# Patient Record
Sex: FEMALE | Race: WHITE | NOT HISPANIC OR LATINO | Employment: OTHER | URBAN - METROPOLITAN AREA
[De-identification: names, ages, dates, MRNs, and addresses within clinical notes are randomized per-mention and may not be internally consistent; named-entity substitution may affect disease eponyms.]

---

## 2017-02-27 DIAGNOSIS — I10 ESSENTIAL HYPERTENSION: ICD-10-CM

## 2017-03-02 DIAGNOSIS — I10 ESSENTIAL HYPERTENSION, BENIGN: ICD-10-CM

## 2017-03-02 RX ORDER — IRBESARTAN 150 MG/1
TABLET ORAL
Qty: 30 TAB | Refills: 6 | Status: SHIPPED | OUTPATIENT
Start: 2017-03-02 | End: 2017-03-02 | Stop reason: SDUPTHER

## 2017-03-06 RX ORDER — IRBESARTAN 150 MG/1
TABLET ORAL
Qty: 90 TAB | Refills: 1 | Status: SHIPPED | OUTPATIENT
Start: 2017-03-06 | End: 2017-12-01 | Stop reason: SDUPTHER

## 2017-04-04 ENCOUNTER — HOSPITAL ENCOUNTER (OUTPATIENT)
Dept: LAB | Facility: MEDICAL CENTER | Age: 77
End: 2017-04-04
Attending: INTERNAL MEDICINE
Payer: MEDICARE

## 2017-04-04 DIAGNOSIS — I10 ESSENTIAL HYPERTENSION, BENIGN: ICD-10-CM

## 2017-04-04 DIAGNOSIS — E87.6 HYPOKALEMIA: ICD-10-CM

## 2017-04-04 LAB
ANION GAP SERPL CALC-SCNC: 8 MMOL/L (ref 0–11.9)
BUN SERPL-MCNC: 10 MG/DL (ref 8–22)
CALCIUM SERPL-MCNC: 9.3 MG/DL (ref 8.5–10.5)
CHLORIDE SERPL-SCNC: 97 MMOL/L (ref 96–112)
CO2 SERPL-SCNC: 29 MMOL/L (ref 20–33)
CREAT SERPL-MCNC: 0.6 MG/DL (ref 0.5–1.4)
GFR SERPL CREATININE-BSD FRML MDRD: >60 ML/MIN/1.73 M 2
GLUCOSE SERPL-MCNC: 86 MG/DL (ref 65–99)
POTASSIUM SERPL-SCNC: 3.7 MMOL/L (ref 3.6–5.5)
SODIUM SERPL-SCNC: 134 MMOL/L (ref 135–145)

## 2017-04-04 PROCEDURE — 36415 COLL VENOUS BLD VENIPUNCTURE: CPT

## 2017-04-04 PROCEDURE — 80048 BASIC METABOLIC PNL TOTAL CA: CPT

## 2017-04-06 ENCOUNTER — OFFICE VISIT (OUTPATIENT)
Dept: CARDIOLOGY | Facility: MEDICAL CENTER | Age: 77
End: 2017-04-06
Payer: MEDICARE

## 2017-04-06 VITALS
OXYGEN SATURATION: 98 % | DIASTOLIC BLOOD PRESSURE: 80 MMHG | HEART RATE: 92 BPM | BODY MASS INDEX: 26.5 KG/M2 | HEIGHT: 62 IN | WEIGHT: 144 LBS | SYSTOLIC BLOOD PRESSURE: 120 MMHG

## 2017-04-06 DIAGNOSIS — I10 ESSENTIAL HYPERTENSION, BENIGN: ICD-10-CM

## 2017-04-06 PROCEDURE — 4040F PNEUMOC VAC/ADMIN/RCVD: CPT | Mod: 8P | Performed by: INTERNAL MEDICINE

## 2017-04-06 PROCEDURE — 99213 OFFICE O/P EST LOW 20 MIN: CPT | Performed by: INTERNAL MEDICINE

## 2017-04-06 PROCEDURE — G8432 DEP SCR NOT DOC, RNG: HCPCS | Performed by: INTERNAL MEDICINE

## 2017-04-06 PROCEDURE — 1101F PT FALLS ASSESS-DOCD LE1/YR: CPT | Mod: 8P | Performed by: INTERNAL MEDICINE

## 2017-04-06 PROCEDURE — G8419 CALC BMI OUT NRM PARAM NOF/U: HCPCS | Performed by: INTERNAL MEDICINE

## 2017-04-06 PROCEDURE — 1036F TOBACCO NON-USER: CPT | Performed by: INTERNAL MEDICINE

## 2017-04-06 ASSESSMENT — ENCOUNTER SYMPTOMS
CONSTITUTIONAL NEGATIVE: 1
NAUSEA: 0
PALPITATIONS: 0
SHORTNESS OF BREATH: 0
WEAKNESS: 0
NEUROLOGICAL NEGATIVE: 1
VOMITING: 0
BLURRED VISION: 0

## 2017-04-06 NOTE — PROGRESS NOTES
Subjective:   Danica Rapp is a 76 y.o. female who presents today for follow-up of hypertension and a history of hypokalemia. Patient has had no chest pain or palpitations. She is under severe stress at this time as her  is quite ill in the hospital and on a ventilator. She is having a difficult time doing with his illness and is quite tearful.     Past Medical History   Diagnosis Date   • Nonspecific abnormal electrocardiogram (ECG) (EKG) 6/5/2012   • Other chest pain 6/5/2012   • Essential hypertension, benign 6/5/2012     History reviewed. No pertinent past surgical history.  History reviewed. No pertinent family history.  History   Smoking status   • Never Smoker    Smokeless tobacco   • Never Used     Allergies   Allergen Reactions   • Macrodantin [Nitrofurantoin Macrocrystal] Itching and Swelling   • Augmentin Itching     Headaches   • Z-Pako [Azithromycin] Itching and Nausea     Outpatient Encounter Prescriptions as of 4/6/2017   Medication Sig Dispense Refill   • irbesartan (AVAPRO) 150 MG Tab TAKE 1 TABLET BY MOUTH DAILY 90 Tab 1   • vitamin D (CHOLECALCIFEROL) 1000 UNIT Tab Take 1,000 Units by mouth every day.     • potassium chloride (KLOR-CON) 8 MEQ tablet Take 1 Tab by mouth every day. 90 Tab 3   • amlodipine (NORVASC) 5 MG Tab Take 1 Tab by mouth every day. 90 Tab 3   • Docusate Calcium (STOOL SOFTENER PO) Take 1 Tab by mouth every bedtime.     • Probiotic Product (PROBIOTIC DAILY PO) Take 1 Each by mouth every day.     • estradiol (VIVELLE DOT) 0.1 MG/24HR PTTW Apply 1 Patch to skin as directed every 7 days.     • Calcium Carbonate-Vitamin D (CALCIUM + D PO) Take 500 mg by mouth every day.     • Multiple Vitamin (MULTI-VITAMIN DAILY PO) Take  by mouth every day.     • estradiol (ESTRACE) 1 MG TABS Take 1 mg by mouth. Three times a week (M-W-F)     • omeprazole (PRILOSEC) 20 MG CPDR Take 20 mg by mouth every day.       • aspirin EC (ECOTRIN) 81 MG TBEC Take 81 mg by mouth every 48 hours.  "Indications: NOT TAKING     • NON SPECIFIED Theracran 650mg, SI Twice a day     • sulfamethoxazole-trimethoprim (BACTRIM DS,SEPTRA DS) 800-160 MG TABS Take 1 Tab by mouth every day.         No facility-administered encounter medications on file as of 2017.     Review of Systems   Constitutional: Negative.  Negative for malaise/fatigue.   Eyes: Negative for blurred vision.   Respiratory: Negative for shortness of breath.    Cardiovascular: Negative for chest pain, palpitations and leg swelling.   Gastrointestinal: Negative for nausea and vomiting.   Neurological: Negative.  Negative for weakness.   Psychiatric/Behavioral:        Situational stress with her 's illness        Objective:   /80 mmHg  Pulse 92  Ht 1.575 m (5' 2.01\")  Wt 65.318 kg (144 lb)  BMI 26.33 kg/m2  SpO2 98%    Physical Exam   Constitutional: She is oriented to person, place, and time. She appears well-developed and well-nourished. No distress.   HENT:   Head: Atraumatic.   Eyes: Conjunctivae and EOM are normal. Pupils are equal, round, and reactive to light.   Neck: Neck supple. No JVD present.   Cardiovascular: Normal rate and regular rhythm.    Murmur heard.  Faint systolic ejection murmur   Pulmonary/Chest: Effort normal and breath sounds normal. No respiratory distress. She has no wheezes. She has no rales.   Abdominal: Soft. There is no tenderness.   Musculoskeletal: She exhibits no edema.   Neurological: She is alert and oriented to person, place, and time.   Skin: Skin is warm and dry. She is not diaphoretic.       Assessment:     1. Essential hypertension, benign         Medical Decision Making:  Today's Assessment / Status / Plan:   Her blood pressure is under reasonable control. Potassium is 3.7 by reviewed with her. Return 6 months.  "

## 2017-04-06 NOTE — MR AVS SNAPSHOT
"        Danica Rapp   2017 3:40 PM   Office Visit   MRN: [id number]    Department:  [facility]   Dept Phone:  [phone]    Description:  Female : 1940   Provider:  Christo Garrett M.D.           Reason for Visit     Follow-Up           Allergies as of 2017     Allergen Noted Reactions    Macrodantin [Nitrofurantoin Macrocrystal] 10/31/2012   Itching, Swelling    Augmentin 10/31/2012   Itching    Headaches    Z-Pako [Azithromycin] 10/31/2012   Itching, Nausea      You were diagnosed with     Essential hypertension, benign   [401.1.ICD-9-CM]         Vital Signs     Blood Pressure Pulse Height Weight Body Mass Index Oxygen Saturation    120/80 mmHg 92 1.575 m (5' 2.01\") 65.318 kg (144 lb) 26.33 kg/m2 98%    Smoking Status                   Never Smoker            Basic Information     Date Of Birth Sex Race Ethnicity Preferred Language    1940 Female White Non- English      Your appointments     Oct 16, 2017  1:40 PM   FOLLOW UP with Christo Garrett M.D.   [facility] (--)    [address]  [facility] [id number] [phone]              Problem List              ICD-10-CM Priority Class Noted - Resolved    Nonspecific abnormal electrocardiogram (ECG) (EKG) R94.31   2012 - Present    Other chest pain R07.89   2012 - Present    Essential hypertension, benign I10   2012 - Present    Hypokalemia E87.6   7/15/2014 - Present      Health Maintenance        Date Due Completion Dates    IMM DTaP/Tdap/Td Vaccine (1 - Tdap) 1959 ---    PAP SMEAR 1961 ---    MAMMOGRAM 1980 ---    COLONOSCOPY 1990 ---    IMM ZOSTER VACCINE 2000 ---    IMM PNEUMOCOCCAL 65+ (ADULT) LOW/MEDIUM RISK SERIES (1 of 2 - PCV13) 2005 ---    BONE DENSITY 10/17/2018 10/17/2013, 3/4/2010, 2007            Current Immunizations     No immunizations on file.      Below and/or attached are the medications your provider " expects you to take. Review all of your home medications and newly ordered medications with your provider and/or pharmacist. Follow medication instructions as directed by your provider and/or pharmacist. Please keep your medication list with you and share with your provider. Update the information when medications are discontinued, doses are changed, or new medications (including over-the-counter products) are added; and carry medication information at all times in the event of emergency situations     Allergies:  MACRODANTIN - Itching,Swelling     AUGMENTIN - Itching     Z-VLAD - Itching,Nausea               Medications  Valid as of: 2017 -  4:06 PM    Generic Name Brand Name Tablet Size Instructions for use    AmLODIPine Besylate (Tab) NORVASC 5 MG Take 1 Tab by mouth every day.        Aspirin (Tablet Delayed Response) ECOTRIN 81 MG Take 81 mg by mouth every 48 hours. Indications: NOT TAKING        Calcium Citrate-Vitamin D   Take 500 mg by mouth every day.        Cholecalciferol (Tab) cholecalciferol 1000 UNIT Take 1,000 Units by mouth every day.        Docusate Calcium   Take 1 Tab by mouth every bedtime.        Estradiol (Tab) ESTRACE 1 MG Take 1 mg by mouth. Three times a week (--)        Estradiol (PATCH BIWEEKLY) VIVELLE DOT 0.1 MG/24HR Apply 1 Patch to skin as directed every 7 days.        Irbesartan (Tab) AVAPRO 150 MG TAKE 1 TABLET BY MOUTH DAILY        Multiple Vitamin   Take  by mouth every day.        NON SPECIFIED   Theracran 650mg, SI Twice a day        Omeprazole (CAPSULE DELAYED RELEASE) PRILOSEC 20 MG Take 20 mg by mouth every day.          Potassium Chloride (Tab CR) KLOR-CON 8 MEQ Take 1 Tab by mouth every day.        Probiotic Product   Take 1 Each by mouth every day.        Sulfamethoxazole-Trimethoprim (Tab) BACTRIM DS,SEPTRA -160 MG Take 1 Tab by mouth every day.          .                 Medicines prescribed today were sent to:     [id number] - [place]  [place] - [address]    [address] [place] [address]    Phone: [phone] Fax: [phone]    Open 24 Hours?: No      Medication refill instructions:       If your prescription bottle indicates you have medication refills left, it is not necessary to call your provider’s office. Please contact your pharmacy and they will refill your medication.    If your prescription bottle indicates you do not have any refills left, you may request refills at any time through one of the following ways: The online [employer] system (except Urgent Care), by calling your provider’s office, or by asking your pharmacy to contact your provider’s office with a refill request. Medication refills are processed only during regular business hours and may not be available until the next business day. Your provider may request additional information or to have a follow-up visit with you prior to refilling your medication.   *Please Note: Medication refills are assigned a new Rx number when refilled electronically. Your pharmacy may indicate that no refills were authorized even though a new prescription for the same medication is available at the pharmacy. Please request the medicine by name with the pharmacy before contacting your provider for a refill.           [employer] Access Code: Activation code not generated  Current [employer] Status: Active

## 2017-04-13 RX ORDER — ALPRAZOLAM 0.25 MG/1
0.25 TABLET ORAL 2 TIMES DAILY PRN
COMMUNITY
Start: 2017-04-13 | End: 2018-04-04

## 2017-04-13 RX ORDER — ALPRAZOLAM 0.25 MG/1
TABLET ORAL
Refills: 0 | OUTPATIENT
Start: 2017-04-13

## 2017-04-13 NOTE — TELEPHONE ENCOUNTER
Per Dr. Garrett: okay to refill Xanax 0.25mg #15 + 1 refill.  Called refill to pharmacy. Pt. Advised.

## 2017-04-13 NOTE — TELEPHONE ENCOUNTER
----- Message from Adrienne Cruz L.P.N. sent at 4/13/2017  9:07 AM PDT -----  Regarding: RE: patient calling to check in with you  Spoke with pt. Dr. Garrett gave her a handwritten RX for Xanax 0.25mg 1 tab BID prn for anxiety #15. She is requesting a refill.  ----- Message -----     From: Anjana Marcos     Sent: 4/13/2017   8:18 AM       To: Adrienne Cruz L.P.N.  Subject: patient calling to check in with you             JESSICA/Adrienne      Patient was told by Dr. Garrett last week to call to let you know how she is doing. She will call back before she leaves to go see her  who is in ICU. [phone]

## 2017-06-03 ENCOUNTER — HOSPITAL ENCOUNTER (OUTPATIENT)
Dept: LAB | Facility: MEDICAL CENTER | Age: 77
End: 2017-06-03
Attending: INTERNAL MEDICINE
Payer: MEDICARE

## 2017-06-03 LAB
ALBUMIN SERPL BCP-MCNC: 3.5 G/DL (ref 3.2–4.9)
ALBUMIN/GLOB SERPL: 1.1 G/DL
ALP SERPL-CCNC: 68 U/L (ref 30–99)
ALT SERPL-CCNC: 20 U/L (ref 2–50)
ANION GAP SERPL CALC-SCNC: 9 MMOL/L (ref 0–11.9)
AST SERPL-CCNC: 21 U/L (ref 12–45)
BASOPHILS # BLD AUTO: 0.9 % (ref 0–1.8)
BASOPHILS # BLD: 0.03 K/UL (ref 0–0.12)
BILIRUB SERPL-MCNC: 0.5 MG/DL (ref 0.1–1.5)
BUN SERPL-MCNC: 12 MG/DL (ref 8–22)
CALCIUM SERPL-MCNC: 8.9 MG/DL (ref 8.5–10.5)
CHLORIDE SERPL-SCNC: 97 MMOL/L (ref 96–112)
CHOLEST SERPL-MCNC: 166 MG/DL (ref 100–199)
CO2 SERPL-SCNC: 25 MMOL/L (ref 20–33)
CREAT SERPL-MCNC: 0.69 MG/DL (ref 0.5–1.4)
EOSINOPHIL # BLD AUTO: 0.1 K/UL (ref 0–0.51)
EOSINOPHIL NFR BLD: 2.9 % (ref 0–6.9)
ERYTHROCYTE [DISTWIDTH] IN BLOOD BY AUTOMATED COUNT: 46.7 FL (ref 35.9–50)
GFR SERPL CREATININE-BSD FRML MDRD: >60 ML/MIN/1.73 M 2
GLOBULIN SER CALC-MCNC: 3.3 G/DL (ref 1.9–3.5)
GLUCOSE SERPL-MCNC: 87 MG/DL (ref 65–99)
HCT VFR BLD AUTO: 36.2 % (ref 37–47)
HDLC SERPL-MCNC: 57 MG/DL
HGB BLD-MCNC: 11.9 G/DL (ref 12–16)
IMM GRANULOCYTES # BLD AUTO: 0.01 K/UL (ref 0–0.11)
IMM GRANULOCYTES NFR BLD AUTO: 0.3 % (ref 0–0.9)
LDLC SERPL CALC-MCNC: 95 MG/DL
LYMPHOCYTES # BLD AUTO: 1.15 K/UL (ref 1–4.8)
LYMPHOCYTES NFR BLD: 33 % (ref 22–41)
MCH RBC QN AUTO: 29 PG (ref 27–33)
MCHC RBC AUTO-ENTMCNC: 32.9 G/DL (ref 33.6–35)
MCV RBC AUTO: 88.3 FL (ref 81.4–97.8)
MONOCYTES # BLD AUTO: 0.39 K/UL (ref 0–0.85)
MONOCYTES NFR BLD AUTO: 11.2 % (ref 0–13.4)
NEUTROPHILS # BLD AUTO: 1.81 K/UL (ref 2–7.15)
NEUTROPHILS NFR BLD: 51.7 % (ref 44–72)
NRBC # BLD AUTO: 0 K/UL
NRBC BLD AUTO-RTO: 0 /100 WBC
PLATELET # BLD AUTO: 391 K/UL (ref 164–446)
PMV BLD AUTO: 10.4 FL (ref 9–12.9)
POTASSIUM SERPL-SCNC: 4.1 MMOL/L (ref 3.6–5.5)
PROT SERPL-MCNC: 6.8 G/DL (ref 6–8.2)
RBC # BLD AUTO: 4.1 M/UL (ref 4.2–5.4)
SODIUM SERPL-SCNC: 131 MMOL/L (ref 135–145)
TRIGL SERPL-MCNC: 68 MG/DL (ref 0–149)
WBC # BLD AUTO: 3.5 K/UL (ref 4.8–10.8)

## 2017-06-03 PROCEDURE — 36415 COLL VENOUS BLD VENIPUNCTURE: CPT

## 2017-06-03 PROCEDURE — 85025 COMPLETE CBC W/AUTO DIFF WBC: CPT

## 2017-06-03 PROCEDURE — 80053 COMPREHEN METABOLIC PANEL: CPT

## 2017-06-03 PROCEDURE — 80061 LIPID PANEL: CPT

## 2017-08-18 ENCOUNTER — OFFICE VISIT (OUTPATIENT)
Dept: CARDIOLOGY | Facility: MEDICAL CENTER | Age: 77
End: 2017-08-18
Payer: MEDICARE

## 2017-08-18 VITALS
DIASTOLIC BLOOD PRESSURE: 70 MMHG | HEART RATE: 88 BPM | WEIGHT: 139 LBS | HEIGHT: 62 IN | SYSTOLIC BLOOD PRESSURE: 150 MMHG | OXYGEN SATURATION: 95 % | BODY MASS INDEX: 25.58 KG/M2

## 2017-08-18 DIAGNOSIS — I10 ESSENTIAL HYPERTENSION, BENIGN: ICD-10-CM

## 2017-08-18 DIAGNOSIS — R94.31 NONSPECIFIC ABNORMAL ELECTROCARDIOGRAM (ECG) (EKG): ICD-10-CM

## 2017-08-18 PROCEDURE — 99213 OFFICE O/P EST LOW 20 MIN: CPT | Performed by: INTERNAL MEDICINE

## 2017-08-18 RX ORDER — CARVEDILOL 6.25 MG/1
6.25 TABLET ORAL 2 TIMES DAILY WITH MEALS
Qty: 60 TAB | Refills: 3 | Status: SHIPPED | OUTPATIENT
Start: 2017-08-18 | End: 2017-08-18 | Stop reason: SDUPTHER

## 2017-08-18 ASSESSMENT — ENCOUNTER SYMPTOMS
NEUROLOGICAL NEGATIVE: 1
CONSTITUTIONAL NEGATIVE: 1
PALPITATIONS: 0
NAUSEA: 0
VOMITING: 0
BLURRED VISION: 0
WEAKNESS: 0
SHORTNESS OF BREATH: 0

## 2017-08-18 NOTE — Clinical Note
[facility]   [address]  [place] [address]  Phone: [phone]  Fax: [phone]              Danica Rapp  1940    Encounter Date: 2017    Christo Garrett M.D.          PROGRESS NOTE:  Subjective:   Danica Rapp is a 76 y.o. female who presents today for follow-up of hypertension. Over the last month, she's had problems with afternoon edema. No chest pain or palpitations.  Her   at [facility] in May and she is having a tough time doing with her loss. She has seen a psychologist. The patient was given a few Xanax prior to her 's death because of anxiety. Her psychologist with prefer to not use any of this medication at this time. No other interval problems.    Past Medical History   Diagnosis Date   • Nonspecific abnormal electrocardiogram (ECG) (EKG) 2012   • Other chest pain 2012   • Essential hypertension, benign 2012     No past surgical history on file.  No family history on file.  History   Smoking status   • Never Smoker    Smokeless tobacco   • Never Used     Allergies   Allergen Reactions   • Macrodantin [Nitrofurantoin Macrocrystal] Itching and Swelling   • Augmentin Itching     Headaches   • Z-Pako [Azithromycin] Itching and Nausea     Outpatient Encounter Prescriptions as of 2017   Medication Sig Dispense Refill   • carvedilol (COREG) 6.25 MG Tab Take 1 Tab by mouth 2 times a day, with meals. 60 Tab 3   • alprazolam (XANAX) 0.25 MG Tab Take 1 Tab by mouth 2 times a day as needed. For anxiety.     • irbesartan (AVAPRO) 150 MG Tab TAKE 1 TABLET BY MOUTH DAILY 90 Tab 1   • vitamin D (CHOLECALCIFEROL) 1000 UNIT Tab Take 1,000 Units by mouth every day.     • potassium chloride (KLOR-CON) 8 MEQ tablet Take 1 Tab by mouth every day. 90 Tab 3   • Docusate Calcium (STOOL SOFTENER PO) Take 1 Tab by mouth every bedtime.     • NON SPECIFIED Theracran 650mg, SI Twice a day     • Probiotic  "Product (PROBIOTIC DAILY PO) Take 1 Each by mouth every day.     • estradiol (VIVELLE DOT) 0.1 MG/24HR PTTW Apply 1 Patch to skin as directed every 7 days.     • Calcium Carbonate-Vitamin D (CALCIUM + D PO) Take 500 mg by mouth every day.     • Multiple Vitamin (MULTI-VITAMIN DAILY PO) Take  by mouth every day.     • estradiol (ESTRACE) 1 MG TABS Take 1 mg by mouth. Three times a week (M-W-F)     • omeprazole (PRILOSEC) 20 MG CPDR Take 20 mg by mouth every day.       • [DISCONTINUED] amlodipine (NORVASC) 5 MG Tab Take 1 Tab by mouth every day. 90 Tab 3   • sulfamethoxazole-trimethoprim (BACTRIM DS,SEPTRA DS) 800-160 MG TABS Take 1 Tab by mouth every day.       • aspirin EC (ECOTRIN) 81 MG TBEC Take 81 mg by mouth every 48 hours. Indications: NOT TAKING       No facility-administered encounter medications on file as of 8/18/2017.     Review of Systems   Constitutional: Negative.  Negative for malaise/fatigue.   Eyes: Negative for blurred vision.   Respiratory: Negative for shortness of breath.    Cardiovascular: Negative for chest pain, palpitations and leg swelling.   Gastrointestinal: Negative for nausea and vomiting.   Neurological: Negative.  Negative for weakness.   Psychiatric/Behavioral:        Situational stress with her 's illness        Objective:   /70 mmHg  Pulse 88  Ht 1.575 m (5' 2.01\")  Wt 63.05 kg (139 lb)  BMI 25.42 kg/m2  SpO2 95%    Physical Exam   Constitutional: She is oriented to person, place, and time. She appears well-developed and well-nourished. No distress.   HENT:   Head: Atraumatic.   Eyes: Conjunctivae and EOM are normal. Pupils are equal, round, and reactive to light.   Neck: Neck supple. No JVD present.   Cardiovascular: Normal rate and regular rhythm.    Murmur heard.  Faint systolic ejection murmur   Pulmonary/Chest: Effort normal and breath sounds normal. No respiratory distress. She has no wheezes. She has no rales.   Abdominal: Soft. There is no tenderness.   "   Musculoskeletal: She exhibits no edema.   Neurological: She is alert and oriented to person, place, and time.   Skin: Skin is warm and dry. She is not diaphoretic.       Assessment:     1. Essential hypertension, benign  carvedilol (COREG) 6.25 MG Tab   2. Nonspecific abnormal electrocardiogram (ECG) (EKG)  carvedilol (COREG) 6.25 MG Tab       Medical Decision Making:  Today's Assessment / Status / Plan:   The patient's blood pressure is under good control, but she is having an afternoon edema. Parenthetically, she has no significant edema now. The patient has a history of hypokalemia when she was on diuretics, will therefore stop amlodipine and put her on carvedilol 6.25 mg twice daily. To return for blood pressure check and she already has a previously scheduled appointment in October.        Jill Adler M.D.  [address] [place] [address] [place] [address]  VIA Facsimile: [phone]

## 2017-08-18 NOTE — PROGRESS NOTES
Subjective:   Danica Rapp is a 76 y.o. female who presents today for follow-up of hypertension. Over the last month, she's had problems with afternoon edema. No chest pain or palpitations.  Her   at [facility] in May and she is having a tough time doing with her loss. She has seen a psychologist. The patient was given a few Xanax prior to her 's death because of anxiety. Her psychologist with prefer to not use any of this medication at this time. No other interval problems.    Past Medical History   Diagnosis Date   • Nonspecific abnormal electrocardiogram (ECG) (EKG) 2012   • Other chest pain 2012   • Essential hypertension, benign 2012     No past surgical history on file.  No family history on file.  History   Smoking status   • Never Smoker    Smokeless tobacco   • Never Used     Allergies   Allergen Reactions   • Macrodantin [Nitrofurantoin Macrocrystal] Itching and Swelling   • Augmentin Itching     Headaches   • Z-Pako [Azithromycin] Itching and Nausea     Outpatient Encounter Prescriptions as of 2017   Medication Sig Dispense Refill   • carvedilol (COREG) 6.25 MG Tab Take 1 Tab by mouth 2 times a day, with meals. 60 Tab 3   • alprazolam (XANAX) 0.25 MG Tab Take 1 Tab by mouth 2 times a day as needed. For anxiety.     • irbesartan (AVAPRO) 150 MG Tab TAKE 1 TABLET BY MOUTH DAILY 90 Tab 1   • vitamin D (CHOLECALCIFEROL) 1000 UNIT Tab Take 1,000 Units by mouth every day.     • potassium chloride (KLOR-CON) 8 MEQ tablet Take 1 Tab by mouth every day. 90 Tab 3   • Docusate Calcium (STOOL SOFTENER PO) Take 1 Tab by mouth every bedtime.     • NON SPECIFIED Theracran 650mg, SI Twice a day     • Probiotic Product (PROBIOTIC DAILY PO) Take 1 Each by mouth every day.     • estradiol (VIVELLE DOT) 0.1 MG/24HR PTTW Apply 1 Patch to skin as directed every 7 days.     • Calcium Carbonate-Vitamin D (CALCIUM + D PO) Take 500 mg by mouth every day.     • Multiple Vitamin  "(MULTI-VITAMIN DAILY PO) Take  by mouth every day.     • estradiol (ESTRACE) 1 MG TABS Take 1 mg by mouth. Three times a week (M-W-F)     • omeprazole (PRILOSEC) 20 MG CPDR Take 20 mg by mouth every day.       • [DISCONTINUED] amlodipine (NORVASC) 5 MG Tab Take 1 Tab by mouth every day. 90 Tab 3   • sulfamethoxazole-trimethoprim (BACTRIM DS,SEPTRA DS) 800-160 MG TABS Take 1 Tab by mouth every day.       • aspirin EC (ECOTRIN) 81 MG TBEC Take 81 mg by mouth every 48 hours. Indications: NOT TAKING       No facility-administered encounter medications on file as of 8/18/2017.     Review of Systems   Constitutional: Negative.  Negative for malaise/fatigue.   Eyes: Negative for blurred vision.   Respiratory: Negative for shortness of breath.    Cardiovascular: Negative for chest pain, palpitations and leg swelling.   Gastrointestinal: Negative for nausea and vomiting.   Neurological: Negative.  Negative for weakness.   Psychiatric/Behavioral:        Situational stress with her 's illness        Objective:   /70 mmHg  Pulse 88  Ht 1.575 m (5' 2.01\")  Wt 63.05 kg (139 lb)  BMI 25.42 kg/m2  SpO2 95%    Physical Exam   Constitutional: She is oriented to person, place, and time. She appears well-developed and well-nourished. No distress.   HENT:   Head: Atraumatic.   Eyes: Conjunctivae and EOM are normal. Pupils are equal, round, and reactive to light.   Neck: Neck supple. No JVD present.   Cardiovascular: Normal rate and regular rhythm.    Murmur heard.  Faint systolic ejection murmur   Pulmonary/Chest: Effort normal and breath sounds normal. No respiratory distress. She has no wheezes. She has no rales.   Abdominal: Soft. There is no tenderness.   Musculoskeletal: She exhibits no edema.   Neurological: She is alert and oriented to person, place, and time.   Skin: Skin is warm and dry. She is not diaphoretic.       Assessment:     1. Essential hypertension, benign  carvedilol (COREG) 6.25 MG Tab   2. " Nonspecific abnormal electrocardiogram (ECG) (EKG)  carvedilol (COREG) 6.25 MG Tab       Medical Decision Making:  Today's Assessment / Status / Plan:   The patient's blood pressure is under good control, but she is having an afternoon edema. Parenthetically, she has no significant edema now. The patient has a history of hypokalemia when she was on diuretics, will therefore stop amlodipine and put her on carvedilol 6.25 mg twice daily. To return for blood pressure check and she already has a previously scheduled appointment in October.

## 2017-08-18 NOTE — MR AVS SNAPSHOT
"        Danica Rapp   2017 12:40 PM   Office Visit   MRN: [id number]    Department:  [facility]   Dept Phone:  [phone]    Description:  Female : 1940   Provider:  Christo Garrett M.D.           Reason for Visit     Follow-Up           Allergies as of 2017     Allergen Noted Reactions    Macrodantin [Nitrofurantoin Macrocrystal] 10/31/2012   Itching, Swelling    Augmentin 10/31/2012   Itching    Headaches    Z-Pako [Azithromycin] 10/31/2012   Itching, Nausea      You were diagnosed with     Essential hypertension, benign   [401.1.ICD-9-CM]       Nonspecific abnormal electrocardiogram (ECG) (EKG)   [794.31.ICD-9-CM]         Vital Signs     Blood Pressure Pulse Height Weight Body Mass Index Oxygen Saturation    150/70 mmHg 88 1.575 m (5' 2.01\") 63.05 kg (139 lb) 25.42 kg/m2 95%    Smoking Status                   Never Smoker            Basic Information     Date Of Birth Sex Race Ethnicity Preferred Language    1940 Female White Non- English      Your appointments     Oct 16, 2017  1:40 PM   FOLLOW UP with Christo Garrett M.D.   [facility] (--)    [address] , [facility] [address]  [facility] [id number] [phone]              Problem List              ICD-10-CM Priority Class Noted - Resolved    Nonspecific abnormal electrocardiogram (ECG) (EKG) R94.31   2012 - Present    Other chest pain R07.89   2012 - Present    Essential hypertension, benign I10   2012 - Present    Hypokalemia E87.6   7/15/2014 - Present      Health Maintenance        Date Due Completion Dates    IMM DTaP/Tdap/Td Vaccine (1 - Tdap) 1959 ---    PAP SMEAR 1961 ---    MAMMOGRAM 1980 ---    COLONOSCOPY 1990 ---    IMM ZOSTER VACCINE 2000 ---    IMM PNEUMOCOCCAL 65+ (ADULT) LOW/MEDIUM RISK SERIES (1 of 2 - PCV13) 2005 ---    IMM INFLUENZA (1) 2017 ---    BONE DENSITY 10/17/2018 10/17/2013, 3/4/2010, 2007            "   Current Immunizations     No immunizations on file.      Below and/or attached are the medications your provider expects you to take. Review all of your home medications and newly ordered medications with your provider and/or pharmacist. Follow medication instructions as directed by your provider and/or pharmacist. Please keep your medication list with you and share with your provider. Update the information when medications are discontinued, doses are changed, or new medications (including over-the-counter products) are added; and carry medication information at all times in the event of emergency situations     Allergies:  MACRODANTIN - Itching,Swelling     AUGMENTIN - Itching     Z-VLAD - Itching,Nausea               Medications  Valid as of: 2017 -  1:06 PM    Generic Name Brand Name Tablet Size Instructions for use    ALPRAZolam (Tab) XANAX 0.25 MG Take 1 Tab by mouth 2 times a day as needed. For anxiety.        Aspirin (Tablet Delayed Response) ECOTRIN 81 MG Take 81 mg by mouth every 48 hours. Indications: NOT TAKING        Calcium Citrate-Vitamin D   Take 500 mg by mouth every day.        Carvedilol (Tab) COREG 6.25 MG Take 1 Tab by mouth 2 times a day, with meals.        Cholecalciferol (Tab) cholecalciferol 1000 UNIT Take 1,000 Units by mouth every day.        Docusate Calcium   Take 1 Tab by mouth every bedtime.        Estradiol (Tab) ESTRACE 1 MG Take 1 mg by mouth. Three times a week (--)        Estradiol (PATCH BIWEEKLY) VIVELLE DOT 0.1 MG/24HR Apply 1 Patch to skin as directed every 7 days.        Irbesartan (Tab) AVAPRO 150 MG TAKE 1 TABLET BY MOUTH DAILY        Multiple Vitamin   Take  by mouth every day.        NON SPECIFIED   Theracran 650mg, SI Twice a day        Omeprazole (CAPSULE DELAYED RELEASE) PRILOSEC 20 MG Take 20 mg by mouth every day.          Potassium Chloride (Tab CR) KLOR-CON 8 MEQ Take 1 Tab by mouth every day.        Probiotic Product   Take 1 Each by mouth every  day.        Sulfamethoxazole-Trimethoprim (Tab) BACTRIM DS,SEPTRA -160 MG Take 1 Tab by mouth every day.          .                 Medicines prescribed today were sent to:     [facility], [place] - [address] [facility] [address]    [address] [facility] [place] [address]    Phone: [phone] Fax: [phone]    Open 24 Hours?: No      Medication refill instructions:       If your prescription bottle indicates you have medication refills left, it is not necessary to call your provider’s office. Please contact your pharmacy and they will refill your medication.    If your prescription bottle indicates you do not have any refills left, you may request refills at any time through one of the following ways: The online [employer] system (except Urgent Care), by calling your provider’s office, or by asking your pharmacy to contact your provider’s office with a refill request. Medication refills are processed only during regular business hours and may not be available until the next business day. Your provider may request additional information or to have a follow-up visit with you prior to refilling your medication.   *Please Note: Medication refills are assigned a new Rx number when refilled electronically. Your pharmacy may indicate that no refills were authorized even though a new prescription for the same medication is available at the pharmacy. Please request the medicine by name with the pharmacy before contacting your provider for a refill.           Gydget Access Code: Activation code not generated  Current [employer]get Status: Active

## 2017-10-23 ENCOUNTER — APPOINTMENT (RX ONLY)
Dept: URBAN - METROPOLITAN AREA CLINIC 4 | Facility: CLINIC | Age: 77
Setting detail: DERMATOLOGY
End: 2017-10-23

## 2017-10-23 DIAGNOSIS — L72.0 EPIDERMAL CYST: ICD-10-CM

## 2017-10-23 DIAGNOSIS — L82.0 INFLAMED SEBORRHEIC KERATOSIS: ICD-10-CM

## 2017-10-23 DIAGNOSIS — D22 MELANOCYTIC NEVI: ICD-10-CM

## 2017-10-23 DIAGNOSIS — L82.1 OTHER SEBORRHEIC KERATOSIS: ICD-10-CM

## 2017-10-23 DIAGNOSIS — L81.4 OTHER MELANIN HYPERPIGMENTATION: ICD-10-CM | Status: STABLE

## 2017-10-23 DIAGNOSIS — L57.0 ACTINIC KERATOSIS: ICD-10-CM

## 2017-10-23 PROBLEM — D22.9 MELANOCYTIC NEVI, UNSPECIFIED: Status: ACTIVE | Noted: 2017-10-23

## 2017-10-23 PROBLEM — D48.5 NEOPLASM OF UNCERTAIN BEHAVIOR OF SKIN: Status: ACTIVE | Noted: 2017-10-23

## 2017-10-23 PROCEDURE — 11100: CPT | Mod: 59

## 2017-10-23 PROCEDURE — 17110 DESTRUCTION B9 LES UP TO 14: CPT

## 2017-10-23 PROCEDURE — ? BIOPSY BY SHAVE METHOD

## 2017-10-23 PROCEDURE — 17000 DESTRUCT PREMALG LESION: CPT | Mod: 59

## 2017-10-23 PROCEDURE — ? LIQUID NITROGEN

## 2017-10-23 PROCEDURE — ? COUNSELING

## 2017-10-23 PROCEDURE — 17003 DESTRUCT PREMALG LES 2-14: CPT

## 2017-10-23 PROCEDURE — 99213 OFFICE O/P EST LOW 20 MIN: CPT | Mod: 25

## 2017-10-23 ASSESSMENT — LOCATION DETAILED DESCRIPTION DERM
LOCATION DETAILED: LEFT SUPERIOR NASAL CHEEK
LOCATION DETAILED: RIGHT PROXIMAL PRETIBIAL REGION
LOCATION DETAILED: NASAL DORSUM
LOCATION DETAILED: RIGHT LATERAL BREAST 11-12:00 REGION
LOCATION DETAILED: LEFT UPPER CUTANEOUS LIP
LOCATION DETAILED: RIGHT MEDIAL BREAST 2-3:00 REGION
LOCATION DETAILED: RIGHT LATERAL SUPERIOR CHEST
LOCATION DETAILED: LEFT INFERIOR LATERAL NECK
LOCATION DETAILED: RIGHT DISTAL CALF

## 2017-10-23 ASSESSMENT — LOCATION ZONE DERM
LOCATION ZONE: LEG
LOCATION ZONE: FACE
LOCATION ZONE: NOSE
LOCATION ZONE: LIP
LOCATION ZONE: NECK
LOCATION ZONE: TRUNK

## 2017-10-23 ASSESSMENT — LOCATION SIMPLE DESCRIPTION DERM
LOCATION SIMPLE: RIGHT CALF
LOCATION SIMPLE: CHEST
LOCATION SIMPLE: LEFT LIP
LOCATION SIMPLE: RIGHT BREAST
LOCATION SIMPLE: LEFT CHEEK
LOCATION SIMPLE: NOSE
LOCATION SIMPLE: RIGHT PRETIBIAL REGION
LOCATION SIMPLE: LEFT ANTERIOR NECK

## 2017-10-23 NOTE — PROCEDURE: BIOPSY BY SHAVE METHOD
Consent: Written consent was obtained and risks were reviewed including but not limited to scarring, infection, bleeding, scabbing, incomplete removal, nerve damage and allergy to anesthesia.
Wound Care: Petrolatum
Hemostasis: Aluminum Chloride
Silver Nitrate Text: The wound bed was treated with silver nitrate after the biopsy was performed.
Additional Anesthesia Volume In Cc (Will Not Render If 0): 0
Dressing: bandage
Destruction After The Procedure: Yes
Biopsy Type: H and E
Notification Instructions: Patient will be notified of biopsy results. However, patient instructed to call the office if not contacted within 2 weeks.
Render Post-Care Instructions In Note?: no
Cryotherapy Text: The wound bed was treated with cryotherapy after the biopsy was performed.
Biopsy Method: Personna blade
Size Of Lesion In Cm: 0.3
Billing Type: Third-Party Bill
Lab: [id number]
Electrodesiccation And Curettage Text: The wound bed was treated with electrodesiccation and curettage after the biopsy was performed.
Detail Level: Detailed
Anesthesia Type: 1% lidocaine with 1:100,000 epinephrine and 408mcg clindamycin/ml and a 1:10 solution of 8.4% sodium bicarbonate
Lab Facility: 
Post-Care Instructions: I reviewed with the patient in detail post-care instructions. Patient is to keep the biopsy site dry overnight, and then apply bacitracin twice daily until healed. Patient may apply hydrogen peroxide soaks to remove any crusting.
Type Of Destruction Used: Cryotherapy

## 2017-10-23 NOTE — PROCEDURE: LIQUID NITROGEN
Add 52 Modifier (Optional): no
Post-Care Instructions: I reviewed with the patient in detail post-care instructions. Patient is to wear sunprotection, and avoid picking at any of the treated lesions. Pt may apply Vaseline to crusted or scabbing areas.
Detail Level: Detailed
Medical Necessity Information: It is in your best interest to select a reason for this procedure from the list below. All of these items fulfill various CMS LCD requirements except the new and changing color options.
Medical Necessity Clause: This procedure was medically necessary because the lesions that were treated were:
Include Z78.9 (Other Specified Conditions Influencing Health Status) As An Associated Diagnosis?: Yes
Consent: The patient's consent was obtained including but not limited to risks of crusting, scabbing, blistering, scarring, darker or lighter pigmentary change, recurrence, incomplete removal and infection.
Duration Of Freeze Thaw-Cycle (Seconds): 0

## 2017-10-23 NOTE — PROCEDURE: COUNSELING
Detail Level: Zone
Detail Level: Generalized
Detail Level: Detailed
Patient Specific Counseling (Will Not Stick From Patient To Patient): Trial of ln2 today on 2 lesions, left cheek, per pt request.  Advised charge of $150 if desires additional treatment.

## 2017-10-24 ENCOUNTER — OFFICE VISIT (OUTPATIENT)
Dept: CARDIOLOGY | Facility: MEDICAL CENTER | Age: 77
End: 2017-10-24
Payer: MEDICARE

## 2017-10-24 VITALS
WEIGHT: 145 LBS | DIASTOLIC BLOOD PRESSURE: 80 MMHG | BODY MASS INDEX: 26.68 KG/M2 | HEART RATE: 62 BPM | OXYGEN SATURATION: 96 % | SYSTOLIC BLOOD PRESSURE: 122 MMHG | HEIGHT: 62 IN

## 2017-10-24 DIAGNOSIS — I10 ESSENTIAL HYPERTENSION, BENIGN: ICD-10-CM

## 2017-10-24 DIAGNOSIS — F43.29 GRIEF REACTION WITH PROLONGED BEREAVEMENT: ICD-10-CM

## 2017-10-24 PROCEDURE — 99213 OFFICE O/P EST LOW 20 MIN: CPT | Performed by: INTERNAL MEDICINE

## 2017-10-24 ASSESSMENT — ENCOUNTER SYMPTOMS
WEAKNESS: 0
NAUSEA: 0
NEUROLOGICAL NEGATIVE: 1
BLURRED VISION: 0
VOMITING: 0
PALPITATIONS: 0
CONSTITUTIONAL NEGATIVE: 1
SHORTNESS OF BREATH: 0

## 2017-10-24 NOTE — PROGRESS NOTES
Subjective:   Danica Rapp is a 76 y.o. female who presents today for follow-up of hypertension. Last visit, she was started on carvedilol but was only taking this once a day.  The patient's   in May she still having a very difficult time with her grief.  Initially I gave her 15 tablets of Xanax to get her through the crisis. When her  passed away.. Her primary care physician is recommended antidepressant but she does not want to do this. Counseling was discussed and the patient is active done this but did not think it helped much.  Past Medical History:   Diagnosis Date   • Essential hypertension, benign 2012   • Nonspecific abnormal electrocardiogram (ECG) (EKG) 2012   • Other chest pain 2012     History reviewed. No pertinent surgical history.  History reviewed. No pertinent family history.  History   Smoking Status   • Never Smoker   Smokeless Tobacco   • Never Used     Allergies   Allergen Reactions   • Macrodantin [Nitrofurantoin Macrocrystal] Itching and Swelling   • Augmentin Itching     Headaches   • Z-Pako [Azithromycin] Itching and Nausea     Outpatient Encounter Prescriptions as of 10/24/2017   Medication Sig Dispense Refill   • Cranberry (THERACRAN PO) Take  by mouth.     • Polyethylene Glycol 3350 (MIRALAX PO) Take  by mouth.     • carvedilol (COREG) 6.25 MG Tab TAKE 1 TABLET BY MOUTH TWICE DAILY WITH MEALS 180 Tab 0   • alprazolam (XANAX) 0.25 MG Tab Take 1 Tab by mouth 2 times a day as needed. For anxiety.     • vitamin D (CHOLECALCIFEROL) 1000 UNIT Tab Take 1,000 Units by mouth every day.     • potassium chloride (KLOR-CON) 8 MEQ tablet Take 1 Tab by mouth every day. 90 Tab 3   • Docusate Calcium (STOOL SOFTENER PO) Take 1 Tab by mouth every bedtime.     • NON SPECIFIED Theracran 650mg, SI Twice a day     • Probiotic Product (PROBIOTIC DAILY PO) Take 1 Each by mouth every day.     • estradiol (VIVELLE DOT) 0.1 MG/24HR PTTW Apply 1 Patch to skin as directed every 7  "days.     • Calcium Carbonate-Vitamin D (CALCIUM + D PO) Take 500 mg by mouth every day.     • Multiple Vitamin (MULTI-VITAMIN DAILY PO) Take  by mouth every day.     • estradiol (ESTRACE) 1 MG TABS Take 1 mg by mouth. Three times a week (M-W-F)     • omeprazole (PRILOSEC) 20 MG CPDR Take 20 mg by mouth every day.       • irbesartan (AVAPRO) 150 MG Tab TAKE 1 TABLET BY MOUTH DAILY 90 Tab 1   • sulfamethoxazole-trimethoprim (BACTRIM DS,SEPTRA DS) 800-160 MG TABS Take 1 Tab by mouth every day.       • aspirin EC (ECOTRIN) 81 MG TBEC Take 81 mg by mouth every 48 hours. Indications: NOT TAKING       No facility-administered encounter medications on file as of 10/24/2017.      Review of Systems   Constitutional: Negative.  Negative for malaise/fatigue.   Eyes: Negative for blurred vision.   Respiratory: Negative for shortness of breath.    Cardiovascular: Negative for chest pain, palpitations and leg swelling.   Gastrointestinal: Negative for nausea and vomiting.   Neurological: Negative.  Negative for weakness.   Psychiatric/Behavioral:        Situational stress with her 's illness        Objective:   /80   Pulse 62   Ht 1.575 m (5' 2.01\")   Wt 65.8 kg (145 lb)   SpO2 96%   BMI 26.51 kg/m²     Physical Exam   Constitutional: She is oriented to person, place, and time. She appears well-developed and well-nourished. No distress.   HENT:   Head: Atraumatic.   Eyes: Conjunctivae and EOM are normal. Pupils are equal, round, and reactive to light.   Neck: Neck supple. No JVD present.   Cardiovascular: Normal rate and regular rhythm.    Murmur heard.  Faint systolic ejection murmur   Pulmonary/Chest: Effort normal and breath sounds normal. No respiratory distress. She has no wheezes. She has no rales.   Abdominal: Soft. There is no tenderness.   Musculoskeletal: She exhibits no edema.   Neurological: She is alert and oriented to person, place, and time.   Skin: Skin is warm and dry. She is not diaphoretic. "       Assessment:     1. Essential hypertension, benign     2. Grief reaction with prolonged bereavement         Medical Decision Making:  Today's Assessment / Status / Plan:   Her blood pressure remains mildly high and is 145 systolic by my reading. She will continue her irbesartan and increase her carvedilol to twice daily. The patient will check her pressures at home.  Most of the visit was spent discussing her grief reaction. Increased physical activity was discussed. She wishes more Xanax, but was informed that she should go through her primary care physician to discuss pharmacologic therapy for her grief reaction. The patient does not want to take any antidepressants. She also does not want to reconsider counseling.

## 2017-10-24 NOTE — LETTER
[facility]   [address] [facility] [address]  [place] [address]  Phone: [phone]  Fax: [phone]              Danica Rapp  1940    Encounter Date: 10/24/2017    Christo Garrett M.D.          PROGRESS NOTE:  Subjective:   Danica Rapp is a 76 y.o. female who presents today for follow-up of hypertension. Last visit, she was started on carvedilol but was only taking this once a day.  The patient's   in May she still having a very difficult time with her grief.  Initially I gave her 15 tablets of Xanax to get her through the crisis. When her  passed away.. Her primary care physician is recommended antidepressant but she does not want to do this. Counseling was discussed and the patient is active done this but did not think it helped much.  Past Medical History:   Diagnosis Date   • Essential hypertension, benign 2012   • Nonspecific abnormal electrocardiogram (ECG) (EKG) 2012   • Other chest pain 2012     History reviewed. No pertinent surgical history.  History reviewed. No pertinent family history.  History   Smoking Status   • Never Smoker   Smokeless Tobacco   • Never Used     Allergies   Allergen Reactions   • Macrodantin [Nitrofurantoin Macrocrystal] Itching and Swelling   • Augmentin Itching     Headaches   • Z-Pako [Azithromycin] Itching and Nausea     Outpatient Encounter Prescriptions as of 10/24/2017   Medication Sig Dispense Refill   • Cranberry (THERACRAN PO) Take  by mouth.     • Polyethylene Glycol 3350 (MIRALAX PO) Take  by mouth.     • carvedilol (COREG) 6.25 MG Tab TAKE 1 TABLET BY MOUTH TWICE DAILY WITH MEALS 180 Tab 0   • alprazolam (XANAX) 0.25 MG Tab Take 1 Tab by mouth 2 times a day as needed. For anxiety.     • vitamin D (CHOLECALCIFEROL) 1000 UNIT Tab Take 1,000 Units by mouth every day.     • potassium chloride (KLOR-CON) 8 MEQ tablet Take 1 Tab by mouth every day. 90 Tab 3   • Docusate Calcium (STOOL SOFTENER  "PO) Take 1 Tab by mouth every bedtime.     • NON SPECIFIED Theracran 650mg, SI Twice a day     • Probiotic Product (PROBIOTIC DAILY PO) Take 1 Each by mouth every day.     • estradiol (VIVELLE DOT) 0.1 MG/24HR PTTW Apply 1 Patch to skin as directed every 7 days.     • Calcium Carbonate-Vitamin D (CALCIUM + D PO) Take 500 mg by mouth every day.     • Multiple Vitamin (MULTI-VITAMIN DAILY PO) Take  by mouth every day.     • estradiol (ESTRACE) 1 MG TABS Take 1 mg by mouth. Three times a week (--)     • omeprazole (PRILOSEC) 20 MG CPDR Take 20 mg by mouth every day.       • irbesartan (AVAPRO) 150 MG Tab TAKE 1 TABLET BY MOUTH DAILY 90 Tab 1   • sulfamethoxazole-trimethoprim (BACTRIM DS,SEPTRA DS) 800-160 MG TABS Take 1 Tab by mouth every day.       • aspirin EC (ECOTRIN) 81 MG TBEC Take 81 mg by mouth every 48 hours. Indications: NOT TAKING       No facility-administered encounter medications on file as of 10/24/2017.      Review of Systems   Constitutional: Negative.  Negative for malaise/fatigue.   Eyes: Negative for blurred vision.   Respiratory: Negative for shortness of breath.    Cardiovascular: Negative for chest pain, palpitations and leg swelling.   Gastrointestinal: Negative for nausea and vomiting.   Neurological: Negative.  Negative for weakness.   Psychiatric/Behavioral:        Situational stress with her 's illness        Objective:   /80   Pulse 62   Ht 1.575 m (5' 2.01\")   Wt 65.8 kg (145 lb)   SpO2 96%   BMI 26.51 kg/m²      Physical Exam   Constitutional: She is oriented to person, place, and time. She appears well-developed and well-nourished. No distress.   HENT:   Head: Atraumatic.   Eyes: Conjunctivae and EOM are normal. Pupils are equal, round, and reactive to light.   Neck: Neck supple. No JVD present.   Cardiovascular: Normal rate and regular rhythm.    Murmur heard.  Faint systolic ejection murmur   Pulmonary/Chest: Effort normal and breath sounds normal. No " respiratory distress. She has no wheezes. She has no rales.   Abdominal: Soft. There is no tenderness.   Musculoskeletal: She exhibits no edema.   Neurological: She is alert and oriented to person, place, and time.   Skin: Skin is warm and dry. She is not diaphoretic.       Assessment:     1. Essential hypertension, benign     2. Grief reaction with prolonged bereavement         Medical Decision Making:  Today's Assessment / Status / Plan:   Her blood pressure remains mildly high and is 145 systolic by my reading. She will continue her irbesartan and increase her carvedilol to twice daily. The patient will check her pressures at home.  Most of the visit was spent discussing her grief reaction. Increased physical activity was discussed. She wishes more Xanax, but was informed that she should go through her primary care physician to discuss pharmacologic therapy for her grief reaction. The patient does not want to take any antidepressants. She also does not want to reconsider counseling.      Jill Adler M.D.  [address] [place] [id number]  VIA Facsimile: [phone]

## 2017-12-01 ENCOUNTER — OFFICE VISIT (OUTPATIENT)
Dept: CARDIOLOGY | Facility: MEDICAL CENTER | Age: 77
End: 2017-12-01
Payer: MEDICARE

## 2017-12-01 VITALS
WEIGHT: 148 LBS | HEIGHT: 63 IN | BODY MASS INDEX: 26.22 KG/M2 | DIASTOLIC BLOOD PRESSURE: 80 MMHG | HEART RATE: 78 BPM | SYSTOLIC BLOOD PRESSURE: 140 MMHG

## 2017-12-01 DIAGNOSIS — E87.6 HYPOKALEMIA: ICD-10-CM

## 2017-12-01 DIAGNOSIS — I10 ESSENTIAL HYPERTENSION, BENIGN: ICD-10-CM

## 2017-12-01 PROCEDURE — 99214 OFFICE O/P EST MOD 30 MIN: CPT | Performed by: INTERNAL MEDICINE

## 2017-12-01 RX ORDER — IRBESARTAN 300 MG/1
300 TABLET ORAL DAILY
Qty: 30 TAB | Refills: 11 | Status: SHIPPED | OUTPATIENT
Start: 2017-12-01 | End: 2018-03-06

## 2017-12-01 ASSESSMENT — ENCOUNTER SYMPTOMS
NEUROLOGICAL NEGATIVE: 1
SHORTNESS OF BREATH: 0
PALPITATIONS: 0
NAUSEA: 0
CONSTITUTIONAL NEGATIVE: 1
BLURRED VISION: 0
WEAKNESS: 0
VOMITING: 0

## 2017-12-01 NOTE — PROGRESS NOTES
Subjective:   Danica Rapp is a 76 y.o. female who presents today for follow-up of hypertension. Previously she had been taking carvedilol once a day. She is now taken twice a day on most days.   The patient spent 2 or 3 weeks in [place] and had a good time. She is still having a very difficult time with dealing with her 's death in May.  Last visit, she was given 15 Xanax tabs with the proviso that further anxiolytics will be prescribed by her primary care physician or health care professional.  She's had difficult times with hypokalemia when on diuretics in the past.  Past Medical History:   Diagnosis Date   • Essential hypertension, benign 2012   • Nonspecific abnormal electrocardiogram (ECG) (EKG) 2012   • Other chest pain 2012     History reviewed. No pertinent surgical history.  History reviewed. No pertinent family history.  History   Smoking Status   • Never Smoker   Smokeless Tobacco   • Never Used     Allergies   Allergen Reactions   • Macrodantin [Nitrofurantoin Macrocrystal] Itching and Swelling   • Augmentin Itching     Headaches   • Z-Pako [Azithromycin] Itching and Nausea     Outpatient Encounter Prescriptions as of 2017   Medication Sig Dispense Refill   • irbesartan (AVAPRO) 300 MG Tab Take 1 Tab by mouth every day. TAKE 1 TABLET BY MOUTH DAILY 30 Tab 11   • Cranberry (THERACRAN PO) Take  by mouth.     • Polyethylene Glycol 3350 (MIRALAX PO) Take  by mouth.     • carvedilol (COREG) 6.25 MG Tab TAKE 1 TABLET BY MOUTH TWICE DAILY WITH MEALS 180 Tab 0   • alprazolam (XANAX) 0.25 MG Tab Take 1 Tab by mouth 2 times a day as needed. For anxiety.     • vitamin D (CHOLECALCIFEROL) 1000 UNIT Tab Take 1,000 Units by mouth every day.     • potassium chloride (KLOR-CON) 8 MEQ tablet Take 1 Tab by mouth every day. 90 Tab 3   • Docusate Calcium (STOOL SOFTENER PO) Take 1 Tab by mouth every bedtime.     • NON SPECIFIED Theracran 650mg, SI Twice a day     • Probiotic Product (PROBIOTIC  "DAILY PO) Take 1 Each by mouth every day.     • estradiol (VIVELLE DOT) 0.1 MG/24HR PTTW Apply 1 Patch to skin as directed every 7 days.     • Calcium Carbonate-Vitamin D (CALCIUM + D PO) Take 500 mg by mouth every day.     • Multiple Vitamin (MULTI-VITAMIN DAILY PO) Take  by mouth every day.     • estradiol (ESTRACE) 1 MG TABS Take 1 mg by mouth. Three times a week (M-W-F)     • omeprazole (PRILOSEC) 20 MG CPDR Take 20 mg by mouth every day.       • [DISCONTINUED] irbesartan (AVAPRO) 150 MG Tab TAKE 1 TABLET BY MOUTH DAILY 90 Tab 1   • sulfamethoxazole-trimethoprim (BACTRIM DS,SEPTRA DS) 800-160 MG TABS Take 1 Tab by mouth every day.       • aspirin EC (ECOTRIN) 81 MG TBEC Take 81 mg by mouth every 48 hours. Indications: NOT TAKING       No facility-administered encounter medications on file as of 12/1/2017.      Review of Systems   Constitutional: Negative.  Negative for malaise/fatigue.   Eyes: Negative for blurred vision.   Respiratory: Negative for shortness of breath.    Cardiovascular: Negative for chest pain, palpitations and leg swelling.   Gastrointestinal: Negative for nausea and vomiting.   Neurological: Negative.  Negative for weakness.   Psychiatric/Behavioral:        Situational stress with her 's illness        Objective:   /80   Pulse 78   Ht 1.588 m (5' 2.5\")   Wt 67.1 kg (148 lb)   BMI 26.64 kg/m²     Physical Exam   Constitutional: She is oriented to person, place, and time. She appears well-developed and well-nourished. No distress.   HENT:   Head: Atraumatic.   Eyes: Conjunctivae and EOM are normal. Pupils are equal, round, and reactive to light.   Neck: Neck supple. No JVD present.   Cardiovascular: Normal rate and regular rhythm.    Murmur heard.  Faint systolic ejection murmur   Pulmonary/Chest: Effort normal and breath sounds normal. No respiratory distress. She has no wheezes. She has no rales.   Abdominal: Soft. There is no tenderness.   Musculoskeletal: She exhibits no " edema.   Neurological: She is alert and oriented to person, place, and time.   Skin: Skin is warm and dry. She is not diaphoretic.       Assessment:     1. Essential hypertension, benign  irbesartan (AVAPRO) 300 MG Tab   2. Hypokalemia         Medical Decision Making:  Today's Assessment / Status / Plan:   Her blood pressure still under good control and is 162 systolic bilaterally by my reading. Her irbesartan will be increased to 300 mg a day. Continue carvedilol at current dose twice daily. Return for blood pressure check in 2 months.  She is now 6 months past the death of her . The patient is still having a very difficult time with grief. She again requests Xanax but I have declined to refill it. She saw a psychologist but didn't think it helped her much. I've asked her to discuss the situation further with her primary care and also have given her the name of Dr. Jovanny Salvador for further counseling and possible medication management.

## 2017-12-01 NOTE — LETTER
[facility]   [address]  [place] [address]  Phone: [phone]  Fax: [phone]              Danica Rapp  1940    Encounter Date: 12/1/2017    Christo Garrett M.D.          PROGRESS NOTE:  Subjective:   Danica Rapp is a 76 y.o. female who presents today for follow-up of hypertension. Previously she had been taking carvedilol once a day. She is now taken twice a day on most days.   The patient spent 2 or 3 weeks in [place] and had a good time. She is still having a very difficult time with dealing with her 's death in May.  Last visit, she was given 15 Xanax tabs with the proviso that further anxiolytics will be prescribed by her primary care physician or health care professional.  She's had difficult times with hypokalemia when on diuretics in the past.  Past Medical History:   Diagnosis Date   • Essential hypertension, benign 6/5/2012   • Nonspecific abnormal electrocardiogram (ECG) (EKG) 6/5/2012   • Other chest pain 6/5/2012     History reviewed. No pertinent surgical history.  History reviewed. No pertinent family history.  History   Smoking Status   • Never Smoker   Smokeless Tobacco   • Never Used     Allergies   Allergen Reactions   • Macrodantin [Nitrofurantoin Macrocrystal] Itching and Swelling   • Augmentin Itching     Headaches   • Z-Pako [Azithromycin] Itching and Nausea     Outpatient Encounter Prescriptions as of 12/1/2017   Medication Sig Dispense Refill   • irbesartan (AVAPRO) 300 MG Tab Take 1 Tab by mouth every day. TAKE 1 TABLET BY MOUTH DAILY 30 Tab 11   • Cranberry (THERACRAN PO) Take  by mouth.     • Polyethylene Glycol 3350 (MIRALAX PO) Take  by mouth.     • carvedilol (COREG) 6.25 MG Tab TAKE 1 TABLET BY MOUTH TWICE DAILY WITH MEALS 180 Tab 0   • alprazolam (XANAX) 0.25 MG Tab Take 1 Tab by mouth 2 times a day as needed. For anxiety.     • vitamin D (CHOLECALCIFEROL) 1000 UNIT Tab Take 1,000 Units by mouth every  "day.     • potassium chloride (KLOR-CON) 8 MEQ tablet Take 1 Tab by mouth every day. 90 Tab 3   • Docusate Calcium (STOOL SOFTENER PO) Take 1 Tab by mouth every bedtime.     • NON SPECIFIED Theracran 650mg, SI Twice a day     • Probiotic Product (PROBIOTIC DAILY PO) Take 1 Each by mouth every day.     • estradiol (VIVELLE DOT) 0.1 MG/24HR PTTW Apply 1 Patch to skin as directed every 7 days.     • Calcium Carbonate-Vitamin D (CALCIUM + D PO) Take 500 mg by mouth every day.     • Multiple Vitamin (MULTI-VITAMIN DAILY PO) Take  by mouth every day.     • estradiol (ESTRACE) 1 MG TABS Take 1 mg by mouth. Three times a week (--)     • omeprazole (PRILOSEC) 20 MG CPDR Take 20 mg by mouth every day.       • [DISCONTINUED] irbesartan (AVAPRO) 150 MG Tab TAKE 1 TABLET BY MOUTH DAILY 90 Tab 1   • sulfamethoxazole-trimethoprim (BACTRIM DS,SEPTRA DS) 800-160 MG TABS Take 1 Tab by mouth every day.       • aspirin EC (ECOTRIN) 81 MG TBEC Take 81 mg by mouth every 48 hours. Indications: NOT TAKING       No facility-administered encounter medications on file as of 2017.      Review of Systems   Constitutional: Negative.  Negative for malaise/fatigue.   Eyes: Negative for blurred vision.   Respiratory: Negative for shortness of breath.    Cardiovascular: Negative for chest pain, palpitations and leg swelling.   Gastrointestinal: Negative for nausea and vomiting.   Neurological: Negative.  Negative for weakness.   Psychiatric/Behavioral:        Situational stress with her 's illness        Objective:   /80   Pulse 78   Ht 1.588 m (5' 2.5\")   Wt 67.1 kg (148 lb)   BMI 26.64 kg/m²      Physical Exam   Constitutional: She is oriented to person, place, and time. She appears well-developed and well-nourished. No distress.   HENT:   Head: Atraumatic.   Eyes: Conjunctivae and EOM are normal. Pupils are equal, round, and reactive to light.   Neck: Neck supple. No JVD present.   Cardiovascular: Normal rate and " regular rhythm.    Murmur heard.  Faint systolic ejection murmur   Pulmonary/Chest: Effort normal and breath sounds normal. No respiratory distress. She has no wheezes. She has no rales.   Abdominal: Soft. There is no tenderness.   Musculoskeletal: She exhibits no edema.   Neurological: She is alert and oriented to person, place, and time.   Skin: Skin is warm and dry. She is not diaphoretic.       Assessment:     1. Essential hypertension, benign  irbesartan (AVAPRO) 300 MG Tab   2. Hypokalemia         Medical Decision Making:  Today's Assessment / Status / Plan:   Her blood pressure still under good control and is 162 systolic bilaterally by my reading. Her irbesartan will be increased to 300 mg a day. Continue carvedilol at current dose twice daily. Return for blood pressure check in 2 months.  She is now 6 months past the death of her . The patient is still having a very difficult time with grief. She again requests Xanax but I have declined to refill it. She saw a psychologist but didn't think it helped her much. I've asked her to discuss the situation further with her primary care and also have given her the name of Dr. Jovanny Salvador for further counseling and possible medication management.      Jill Adler M.D.  [address] [place] [address] [place] [address]  VIA Facsimile: [phone]

## 2017-12-11 ENCOUNTER — APPOINTMENT (RX ONLY)
Dept: URBAN - METROPOLITAN AREA CLINIC 4 | Facility: CLINIC | Age: 77
Setting detail: DERMATOLOGY
End: 2017-12-11

## 2017-12-11 DIAGNOSIS — L57.0 ACTINIC KERATOSIS: ICD-10-CM

## 2017-12-11 DIAGNOSIS — L57.8 OTHER SKIN CHANGES DUE TO CHRONIC EXPOSURE TO NONIONIZING RADIATION: ICD-10-CM

## 2017-12-11 DIAGNOSIS — I78.8 OTHER DISEASES OF CAPILLARIES: ICD-10-CM

## 2017-12-11 DIAGNOSIS — L82.1 OTHER SEBORRHEIC KERATOSIS: ICD-10-CM

## 2017-12-11 DIAGNOSIS — L72.0 EPIDERMAL CYST: ICD-10-CM

## 2017-12-11 PROCEDURE — ? LIQUID NITROGEN

## 2017-12-11 PROCEDURE — ? DIAGNOSIS COMMENT

## 2017-12-11 PROCEDURE — ? PRESCRIPTION

## 2017-12-11 PROCEDURE — ? MEDICATION COUNSELING

## 2017-12-11 PROCEDURE — 99212 OFFICE O/P EST SF 10 MIN: CPT | Mod: 25

## 2017-12-11 PROCEDURE — 17000 DESTRUCT PREMALG LESION: CPT

## 2017-12-11 PROCEDURE — ? COUNSELING

## 2017-12-11 RX ORDER — TRIAMCINOLONE ACETONIDE 1 MG/G
CREAM TOPICAL BID
Qty: 1 | Refills: 3 | Status: ERX | COMMUNITY
Start: 2017-12-11

## 2017-12-11 RX ADMIN — TRIAMCINOLONE ACETONIDE 1: 1 CREAM TOPICAL at 00:00

## 2017-12-11 ASSESSMENT — LOCATION ZONE DERM
LOCATION ZONE: ARM
LOCATION ZONE: LEG
LOCATION ZONE: FACE

## 2017-12-11 ASSESSMENT — LOCATION DETAILED DESCRIPTION DERM
LOCATION DETAILED: LEFT DORSAL WRIST
LOCATION DETAILED: RIGHT SUPERIOR NASAL CHEEK
LOCATION DETAILED: LEFT PROXIMAL PRETIBIAL REGION
LOCATION DETAILED: RIGHT DISTAL PRETIBIAL REGION
LOCATION DETAILED: LEFT SUPERIOR NASAL CHEEK

## 2017-12-11 ASSESSMENT — LOCATION SIMPLE DESCRIPTION DERM
LOCATION SIMPLE: LEFT CHEEK
LOCATION SIMPLE: RIGHT CHEEK
LOCATION SIMPLE: RIGHT PRETIBIAL REGION
LOCATION SIMPLE: LEFT PRETIBIAL REGION
LOCATION SIMPLE: LEFT WRIST

## 2017-12-11 NOTE — HPI: SECONDARY COMPLAINT
How Severe Is This Condition?: mild
Additional History: Legs have no current lesions/rash today.  Rash has cleared but patient has a photo that she would like to show.
How Severe Is This Condition?: moderate

## 2017-12-27 DIAGNOSIS — I10 HTN (HYPERTENSION), BENIGN: ICD-10-CM

## 2017-12-27 RX ORDER — CARVEDILOL 6.25 MG/1
6.25 TABLET ORAL 2 TIMES DAILY WITH MEALS
Qty: 180 TAB | Refills: 3 | Status: CANCELLED | OUTPATIENT
Start: 2017-12-27

## 2017-12-28 DIAGNOSIS — I10 HTN (HYPERTENSION), BENIGN: ICD-10-CM

## 2017-12-28 RX ORDER — CARVEDILOL 6.25 MG/1
6.25 TABLET ORAL 2 TIMES DAILY WITH MEALS
Qty: 180 TAB | Refills: 3 | OUTPATIENT
Start: 2017-12-28 | End: 2018-04-04

## 2018-01-30 ENCOUNTER — OFFICE VISIT (OUTPATIENT)
Dept: CARDIOLOGY | Facility: MEDICAL CENTER | Age: 78
End: 2018-01-30
Payer: MEDICARE

## 2018-01-30 VITALS
BODY MASS INDEX: 25.69 KG/M2 | DIASTOLIC BLOOD PRESSURE: 84 MMHG | HEIGHT: 63 IN | HEART RATE: 70 BPM | WEIGHT: 145 LBS | SYSTOLIC BLOOD PRESSURE: 130 MMHG

## 2018-01-30 DIAGNOSIS — I10 ESSENTIAL HYPERTENSION, BENIGN: ICD-10-CM

## 2018-01-30 DIAGNOSIS — F43.29 GRIEF REACTION WITH PROLONGED BEREAVEMENT: ICD-10-CM

## 2018-01-30 DIAGNOSIS — R06.83 SNORING: ICD-10-CM

## 2018-01-30 PROCEDURE — 99214 OFFICE O/P EST MOD 30 MIN: CPT | Performed by: INTERNAL MEDICINE

## 2018-01-30 RX ORDER — AMLODIPINE BESYLATE 2.5 MG/1
2.5 TABLET ORAL DAILY
Qty: 30 TAB | Refills: 4 | Status: SHIPPED | OUTPATIENT
Start: 2018-01-30 | End: 2018-02-23

## 2018-01-30 ASSESSMENT — ENCOUNTER SYMPTOMS
SHORTNESS OF BREATH: 0
CONSTITUTIONAL NEGATIVE: 1
PALPITATIONS: 0
NEUROLOGICAL NEGATIVE: 1
NAUSEA: 0
WEAKNESS: 0
VOMITING: 0
BLURRED VISION: 0

## 2018-01-30 NOTE — LETTER
[facility] and [facility]   [address]  [place] [address]  Phone: [phone]  Fax: [phone]              Danica Rapp  1940    Encounter Date: 1/30/2018    Christo Garrett M.D.          PROGRESS NOTE:  Subjective:   Chief complaint: Hypertension.  Danica Rapp is a 76 y.o. female who presents today for follow-up of hypertension. Last visit, her irbesartan was increased to 300 mg a day. She's not been really taking her blood pressure at home. She thinks it carvedilol may make her a bit nauseated.  The patient returned from a trip to [place] and is feeling much better. She still having difficulty with her grief reaction due to the loss of her . She again requests Xanax.  Parenthetically, she did not follow up with Dr. Jovanny Salvador as I recommended she do for treatment of her grief reaction.          History reviewed. No pertinent surgical history.  History reviewed. No pertinent family history.  History   Smoking Status   • Never Smoker   Smokeless Tobacco   • Never Used     Allergies   Allergen Reactions   • Macrodantin [Nitrofurantoin Macrocrystal] Itching and Swelling   • Augmentin Itching     Headaches   • Z-Pako [Azithromycin] Itching and Nausea     Outpatient Encounter Prescriptions as of 1/30/2018   Medication Sig Dispense Refill   • amlodipine (NORVASC) 2.5 MG Tab Take 1 Tab by mouth every day. 30 Tab 4   • carvedilol (COREG) 6.25 MG Tab Take 1 Tab by mouth 2 times a day, with meals. 180 Tab 3   • irbesartan (AVAPRO) 300 MG Tab Take 1 Tab by mouth every day. TAKE 1 TABLET BY MOUTH DAILY 30 Tab 11   • Cranberry (THERACRAN PO) Take  by mouth.     • Polyethylene Glycol 3350 (MIRALAX PO) Take  by mouth.     • vitamin D (CHOLECALCIFEROL) 1000 UNIT Tab Take 1,000 Units by mouth every day.     • potassium chloride (KLOR-CON) 8 MEQ tablet Take 1 Tab by mouth every day. 90 Tab 3   • Docusate Calcium (STOOL SOFTENER PO) Take 1 Tab by mouth every bedtime.   "  • Probiotic Product (PROBIOTIC DAILY PO) Take 1 Each by mouth every day.     • estradiol (VIVELLE DOT) 0.1 MG/24HR PTTW Apply 1 Patch to skin as directed every 7 days.     • Calcium Carbonate-Vitamin D (CALCIUM + D PO) Take 500 mg by mouth every day.     • Multiple Vitamin (MULTI-VITAMIN DAILY PO) Take  by mouth every day.     • estradiol (ESTRACE) 1 MG TABS Take 1 mg by mouth. Three times a week (--)     • omeprazole (PRILOSEC) 20 MG CPDR Take 20 mg by mouth every day.       • alprazolam (XANAX) 0.25 MG Tab Take 1 Tab by mouth 2 times a day as needed. For anxiety.     • NON SPECIFIED Theracran 650mg, SI Twice a day     • sulfamethoxazole-trimethoprim (BACTRIM DS,SEPTRA DS) 800-160 MG TABS Take 1 Tab by mouth every day.       • aspirin EC (ECOTRIN) 81 MG TBEC Take 81 mg by mouth every 48 hours. Indications: NOT TAKING       No facility-administered encounter medications on file as of 2018.      Review of Systems   Constitutional: Negative.  Negative for malaise/fatigue.   Eyes: Negative for blurred vision.   Respiratory: Negative for shortness of breath.    Cardiovascular: Negative for chest pain, palpitations and leg swelling.   Gastrointestinal: Negative for nausea and vomiting.   Neurological: Negative.  Negative for weakness.   Psychiatric/Behavioral:        Situational stress with her 's illness        Objective:   /84   Pulse 70   Ht 1.588 m (5' 2.5\")   Wt 65.8 kg (145 lb)   BMI 26.10 kg/m²      Physical Exam   Constitutional: She is oriented to person, place, and time. She appears well-developed and well-nourished. No distress.   HENT:   Head: Atraumatic.   Eyes: Conjunctivae and EOM are normal. Pupils are equal, round, and reactive to light.   Neck: Neck supple. No JVD present.   Cardiovascular: Normal rate and regular rhythm.    Murmur heard.  Faint systolic ejection murmur   Pulmonary/Chest: Effort normal and breath sounds normal. No respiratory distress. She has no wheezes. " She has no rales.   Abdominal: Soft. There is no tenderness.   Musculoskeletal: She exhibits no edema.   Neurological: She is alert and oriented to person, place, and time.   Skin: Skin is warm and dry. She is not diaphoretic.       Assessment:     1. Snoring  OVERNIGHT PULSE OXIMETRY   2. Essential hypertension, benign  OVERNIGHT PULSE OXIMETRY   3. Grief reaction with prolonged bereavement         Medical Decision Making:  Today's Assessment / Status / Plan:   Her blood pressure is better but not optimally controlled. She's having some nausea issues with carvedilol so will not increase the dose. Amlodipine 2.5 mg daily will be added to the regimen.  She had difficulty with diuretics in the past because of significant hypokalemia when taking them. She is still on potassium replacement and this can be stopped at this point.  She's had a very difficult time with her 's loss. He  last May. Clearly she has better today and less depressed. I refused to fill her Xanax, as I have recommended that any future refills come from her primary care physician or from a behavioral specialist.  She did not follow-up with psychiatrist as I recommended last visit.      Jill Adler M.D.  [address] [place] [id number]  VIA Facsimile: [phone]

## 2018-01-30 NOTE — LETTER
[facility] and [facility]   [address]  [place] [address]  Phone: [phone]  Fax: [phone]              Danica Rapp  1940    Encounter Date: 1/30/2018    Christo Garrett M.D.          PROGRESS NOTE:  Subjective:   Chief complaint: Hypertension.  Danica Rapp is a 76 y.o. female who presents today for follow-up of hypertension. Last visit, her irbesartan was increased to 300 mg a day. She's not been really taking her blood pressure at home. She thinks it carvedilol may make her a bit nauseated.  The patient returned from a trip to [place] and is feeling much better. She still having difficulty with her grief reaction due to the loss of her . She again requests Xanax.  Parenthetically, she did not follow up with Dr. Jovanny Salvador as I recommended she do for treatment of her grief reaction.          History reviewed. No pertinent surgical history.  History reviewed. No pertinent family history.  History   Smoking Status   • Never Smoker   Smokeless Tobacco   • Never Used     Allergies   Allergen Reactions   • Macrodantin [Nitrofurantoin Macrocrystal] Itching and Swelling   • Augmentin Itching     Headaches   • Z-Pako [Azithromycin] Itching and Nausea     Outpatient Encounter Prescriptions as of 1/30/2018   Medication Sig Dispense Refill   • amlodipine (NORVASC) 2.5 MG Tab Take 1 Tab by mouth every day. 30 Tab 4   • carvedilol (COREG) 6.25 MG Tab Take 1 Tab by mouth 2 times a day, with meals. 180 Tab 3   • irbesartan (AVAPRO) 300 MG Tab Take 1 Tab by mouth every day. TAKE 1 TABLET BY MOUTH DAILY 30 Tab 11   • Cranberry (THERACRAN PO) Take  by mouth.     • Polyethylene Glycol 3350 (MIRALAX PO) Take  by mouth.     • vitamin D (CHOLECALCIFEROL) 1000 UNIT Tab Take 1,000 Units by mouth every day.     • potassium chloride (KLOR-CON) 8 MEQ tablet Take 1 Tab by mouth every day. 90 Tab 3   • Docusate Calcium (STOOL SOFTENER PO) Take 1 Tab by mouth every bedtime.   "  • Probiotic Product (PROBIOTIC DAILY PO) Take 1 Each by mouth every day.     • estradiol (VIVELLE DOT) 0.1 MG/24HR PTTW Apply 1 Patch to skin as directed every 7 days.     • Calcium Carbonate-Vitamin D (CALCIUM + D PO) Take 500 mg by mouth every day.     • Multiple Vitamin (MULTI-VITAMIN DAILY PO) Take  by mouth every day.     • estradiol (ESTRACE) 1 MG TABS Take 1 mg by mouth. Three times a week (--)     • omeprazole (PRILOSEC) 20 MG CPDR Take 20 mg by mouth every day.       • alprazolam (XANAX) 0.25 MG Tab Take 1 Tab by mouth 2 times a day as needed. For anxiety.     • NON SPECIFIED Theracran 650mg, SI Twice a day     • sulfamethoxazole-trimethoprim (BACTRIM DS,SEPTRA DS) 800-160 MG TABS Take 1 Tab by mouth every day.       • aspirin EC (ECOTRIN) 81 MG TBEC Take 81 mg by mouth every 48 hours. Indications: NOT TAKING       No facility-administered encounter medications on file as of 2018.      Review of Systems   Constitutional: Negative.  Negative for malaise/fatigue.   Eyes: Negative for blurred vision.   Respiratory: Negative for shortness of breath.    Cardiovascular: Negative for chest pain, palpitations and leg swelling.   Gastrointestinal: Negative for nausea and vomiting.   Neurological: Negative.  Negative for weakness.   Psychiatric/Behavioral:        Situational stress with her 's illness        Objective:   /84   Pulse 70   Ht 1.588 m (5' 2.5\")   Wt 65.8 kg (145 lb)   BMI 26.10 kg/m²      Physical Exam   Constitutional: She is oriented to person, place, and time. She appears well-developed and well-nourished. No distress.   HENT:   Head: Atraumatic.   Eyes: Conjunctivae and EOM are normal. Pupils are equal, round, and reactive to light.   Neck: Neck supple. No JVD present.   Cardiovascular: Normal rate and regular rhythm.    Murmur heard.  Faint systolic ejection murmur   Pulmonary/Chest: Effort normal and breath sounds normal. No respiratory distress. She has no wheezes. " She has no rales.   Abdominal: Soft. There is no tenderness.   Musculoskeletal: She exhibits no edema.   Neurological: She is alert and oriented to person, place, and time.   Skin: Skin is warm and dry. She is not diaphoretic.       Assessment:     1. Snoring  OVERNIGHT PULSE OXIMETRY   2. Essential hypertension, benign  OVERNIGHT PULSE OXIMETRY   3. Grief reaction with prolonged bereavement         Medical Decision Making:  Today's Assessment / Status / Plan:   Her blood pressure is better but not optimally controlled. She's having some nausea issues with carvedilol so will not increase the dose. Amlodipine 2.5 mg daily will be added to the regimen.  She had difficulty with diuretics in the past because of significant hypokalemia when taking them. She is still on potassium replacement and this can be stopped at this point.  She's had a very difficult time with her 's loss. He  last May. Clearly she has better today and less depressed. I refused to fill her Xanax, as I have recommended that any future refills come from her primary care physician or from a behavioral specialist.  She did not follow-up with psychiatrist as I recommended last visit.      Jill Adler M.D.  [address] [place] [address]  VIA Facsimile: [phone]

## 2018-01-31 NOTE — PROGRESS NOTES
Subjective:   Chief complaint: Hypertension.  Danica Rapp is a 76 y.o. female who presents today for follow-up of hypertension. Last visit, her irbesartan was increased to 300 mg a day. She's not been really taking her blood pressure at home. She thinks it carvedilol may make her a bit nauseated.  The patient returned from a trip to [place] and is feeling much better. She still having difficulty with her grief reaction due to the loss of her . She again requests Xanax.  Parenthetically, she did not follow up with Dr. Jovanny Salvador as I recommended she do for treatment of her grief reaction.          History reviewed. No pertinent surgical history.  History reviewed. No pertinent family history.  History   Smoking Status   • Never Smoker   Smokeless Tobacco   • Never Used     Allergies   Allergen Reactions   • Macrodantin [Nitrofurantoin Macrocrystal] Itching and Swelling   • Augmentin Itching     Headaches   • Z-Pako [Azithromycin] Itching and Nausea     Outpatient Encounter Prescriptions as of 1/30/2018   Medication Sig Dispense Refill   • amlodipine (NORVASC) 2.5 MG Tab Take 1 Tab by mouth every day. 30 Tab 4   • carvedilol (COREG) 6.25 MG Tab Take 1 Tab by mouth 2 times a day, with meals. 180 Tab 3   • irbesartan (AVAPRO) 300 MG Tab Take 1 Tab by mouth every day. TAKE 1 TABLET BY MOUTH DAILY 30 Tab 11   • Cranberry (THERACRAN PO) Take  by mouth.     • Polyethylene Glycol 3350 (MIRALAX PO) Take  by mouth.     • vitamin D (CHOLECALCIFEROL) 1000 UNIT Tab Take 1,000 Units by mouth every day.     • potassium chloride (KLOR-CON) 8 MEQ tablet Take 1 Tab by mouth every day. 90 Tab 3   • Docusate Calcium (STOOL SOFTENER PO) Take 1 Tab by mouth every bedtime.     • Probiotic Product (PROBIOTIC DAILY PO) Take 1 Each by mouth every day.     • estradiol (VIVELLE DOT) 0.1 MG/24HR PTTW Apply 1 Patch to skin as directed every 7 days.     • Calcium Carbonate-Vitamin D (CALCIUM + D PO) Take 500 mg by mouth every day.     •  "Multiple Vitamin (MULTI-VITAMIN DAILY PO) Take  by mouth every day.     • estradiol (ESTRACE) 1 MG TABS Take 1 mg by mouth. Three times a week (--)     • omeprazole (PRILOSEC) 20 MG CPDR Take 20 mg by mouth every day.       • alprazolam (XANAX) 0.25 MG Tab Take 1 Tab by mouth 2 times a day as needed. For anxiety.     • NON SPECIFIED Theracran 650mg, SI Twice a day     • sulfamethoxazole-trimethoprim (BACTRIM DS,SEPTRA DS) 800-160 MG TABS Take 1 Tab by mouth every day.       • aspirin EC (ECOTRIN) 81 MG TBEC Take 81 mg by mouth every 48 hours. Indications: NOT TAKING       No facility-administered encounter medications on file as of 2018.      Review of Systems   Constitutional: Negative.  Negative for malaise/fatigue.   Eyes: Negative for blurred vision.   Respiratory: Negative for shortness of breath.    Cardiovascular: Negative for chest pain, palpitations and leg swelling.   Gastrointestinal: Negative for nausea and vomiting.   Neurological: Negative.  Negative for weakness.   Psychiatric/Behavioral:        Situational stress with her 's illness        Objective:   /84   Pulse 70   Ht 1.588 m (5' 2.5\")   Wt 65.8 kg (145 lb)   BMI 26.10 kg/m²     Physical Exam   Constitutional: She is oriented to person, place, and time. She appears well-developed and well-nourished. No distress.   HENT:   Head: Atraumatic.   Eyes: Conjunctivae and EOM are normal. Pupils are equal, round, and reactive to light.   Neck: Neck supple. No JVD present.   Cardiovascular: Normal rate and regular rhythm.    Murmur heard.  Faint systolic ejection murmur   Pulmonary/Chest: Effort normal and breath sounds normal. No respiratory distress. She has no wheezes. She has no rales.   Abdominal: Soft. There is no tenderness.   Musculoskeletal: She exhibits no edema.   Neurological: She is alert and oriented to person, place, and time.   Skin: Skin is warm and dry. She is not diaphoretic.       Assessment:     1. Snoring  " OVERNIGHT PULSE OXIMETRY   2. Essential hypertension, benign  OVERNIGHT PULSE OXIMETRY   3. Grief reaction with prolonged bereavement         Medical Decision Making:  Today's Assessment / Status / Plan:   Her blood pressure is better but not optimally controlled. She's having some nausea issues with carvedilol so will not increase the dose. Amlodipine 2.5 mg daily will be added to the regimen.  She had difficulty with diuretics in the past because of significant hypokalemia when taking them. She is still on potassium replacement and this can be stopped at this point.  She's had a very difficult time with her 's loss. He  last May. Clearly she has better today and less depressed. I refused to fill her Xanax, as I have recommended that any future refills come from her primary care physician or from a behavioral specialist.  She did not follow-up with psychiatrist as I recommended last visit.

## 2018-02-01 ENCOUNTER — TELEPHONE (OUTPATIENT)
Dept: CARDIOLOGY | Facility: MEDICAL CENTER | Age: 78
End: 2018-02-01

## 2018-02-05 ENCOUNTER — APPOINTMENT (RX ONLY)
Dept: URBAN - METROPOLITAN AREA CLINIC 4 | Facility: CLINIC | Age: 78
Setting detail: DERMATOLOGY
End: 2018-02-05

## 2018-02-05 DIAGNOSIS — L90.5 SCAR CONDITIONS AND FIBROSIS OF SKIN: ICD-10-CM

## 2018-02-05 DIAGNOSIS — L82.1 OTHER SEBORRHEIC KERATOSIS: ICD-10-CM

## 2018-02-05 PROCEDURE — ? COUNSELING

## 2018-02-05 PROCEDURE — 99212 OFFICE O/P EST SF 10 MIN: CPT

## 2018-02-05 ASSESSMENT — LOCATION DETAILED DESCRIPTION DERM
LOCATION DETAILED: LEFT SUPERIOR CENTRAL MALAR CHEEK
LOCATION DETAILED: LEFT DORSAL WRIST

## 2018-02-05 ASSESSMENT — LOCATION SIMPLE DESCRIPTION DERM
LOCATION SIMPLE: LEFT CHEEK
LOCATION SIMPLE: LEFT WRIST

## 2018-02-05 ASSESSMENT — LOCATION ZONE DERM
LOCATION ZONE: ARM
LOCATION ZONE: FACE

## 2018-02-09 ENCOUNTER — TELEPHONE (OUTPATIENT)
Dept: CARDIOLOGY | Facility: MEDICAL CENTER | Age: 78
End: 2018-02-09

## 2018-02-09 NOTE — TELEPHONE ENCOUNTER
Pt. Took Amlodipine 2.5mg for 2-3 doses and noted a headache. She also concurrently has UTI so admitted that headache could be caused by UTI. However, she also remembers that she took Amlodipine 5mg previously last July and it caused dependent edema. (She also takes Coreg 6.25mg BID and Avapro 300mg QD.)   She wants to stop Amlodipine. She will monitor BP once dauly in the interim.  To Dr. Garertt.

## 2018-02-09 NOTE — TELEPHONE ENCOUNTER
----- Message from Emma Rico sent at 2/9/2018  9:11 AM PST -----  Regarding: amlodipine issues  Contact: [phone]  JESSICA/kaylie    Pt calling to report headache from amlodipine (NORVASC) 2.5 MG.     Last time this med was prescribed she had swelling in feet & legs.      Please call pt at [phone]

## 2018-02-12 ENCOUNTER — HOSPITAL ENCOUNTER (OUTPATIENT)
Dept: LAB | Facility: MEDICAL CENTER | Age: 78
End: 2018-02-12
Attending: INTERNAL MEDICINE
Payer: MEDICARE

## 2018-02-12 LAB
ANION GAP SERPL CALC-SCNC: 8 MMOL/L (ref 0–11.9)
BASOPHILS # BLD AUTO: 0.6 % (ref 0–1.8)
BASOPHILS # BLD: 0.04 K/UL (ref 0–0.12)
CHLORIDE SERPL-SCNC: 98 MMOL/L (ref 96–112)
CO2 SERPL-SCNC: 29 MMOL/L (ref 20–33)
EOSINOPHIL # BLD AUTO: 0.1 K/UL (ref 0–0.51)
EOSINOPHIL NFR BLD: 1.6 % (ref 0–6.9)
ERYTHROCYTE [DISTWIDTH] IN BLOOD BY AUTOMATED COUNT: 45.8 FL (ref 35.9–50)
HCT VFR BLD AUTO: 39 % (ref 37–47)
HGB BLD-MCNC: 13 G/DL (ref 12–16)
IMM GRANULOCYTES # BLD AUTO: 0.01 K/UL (ref 0–0.11)
IMM GRANULOCYTES NFR BLD AUTO: 0.2 % (ref 0–0.9)
IRON SATN MFR SERPL: 14 % (ref 15–55)
IRON SERPL-MCNC: 57 UG/DL (ref 40–170)
LYMPHOCYTES # BLD AUTO: 1.63 K/UL (ref 1–4.8)
LYMPHOCYTES NFR BLD: 26.3 % (ref 22–41)
MCH RBC QN AUTO: 29.6 PG (ref 27–33)
MCHC RBC AUTO-ENTMCNC: 33.3 G/DL (ref 33.6–35)
MCV RBC AUTO: 88.8 FL (ref 81.4–97.8)
MONOCYTES # BLD AUTO: 0.53 K/UL (ref 0–0.85)
MONOCYTES NFR BLD AUTO: 8.5 % (ref 0–13.4)
NEUTROPHILS # BLD AUTO: 3.89 K/UL (ref 2–7.15)
NEUTROPHILS NFR BLD: 62.8 % (ref 44–72)
NRBC # BLD AUTO: 0 K/UL
NRBC BLD-RTO: 0 /100 WBC
PLATELET # BLD AUTO: 403 K/UL (ref 164–446)
PMV BLD AUTO: 10.7 FL (ref 9–12.9)
POTASSIUM SERPL-SCNC: 3.6 MMOL/L (ref 3.6–5.5)
RBC # BLD AUTO: 4.39 M/UL (ref 4.2–5.4)
SODIUM SERPL-SCNC: 135 MMOL/L (ref 135–145)
TIBC SERPL-MCNC: 398 UG/DL (ref 250–450)
WBC # BLD AUTO: 6.2 K/UL (ref 4.8–10.8)

## 2018-02-12 PROCEDURE — 80051 ELECTROLYTE PANEL: CPT

## 2018-02-12 PROCEDURE — 83550 IRON BINDING TEST: CPT

## 2018-02-12 PROCEDURE — 85025 COMPLETE CBC W/AUTO DIFF WBC: CPT

## 2018-02-12 PROCEDURE — 36415 COLL VENOUS BLD VENIPUNCTURE: CPT

## 2018-02-12 PROCEDURE — 83540 ASSAY OF IRON: CPT

## 2018-02-13 NOTE — TELEPHONE ENCOUNTER
Left message for pt. To with update on BP's.      Message   Received: Today   Message Contents   Christo Garrett M.D.  Adrienne Cruz L.PKIM   Caller: Unspecified (4 days ago,  9:33 AM)             sure

## 2018-02-13 NOTE — TELEPHONE ENCOUNTER
Spoke with pt. She has had a UTI and has been busy the past week and has only checked her BP's twice. BP'i=[id number]/80.   Pt. Wants to wait another week , diligently checking her BP's before adding another med. She promises to call next week.

## 2018-02-21 ENCOUNTER — TELEPHONE (OUTPATIENT)
Dept: CARDIOLOGY | Facility: MEDICAL CENTER | Age: 78
End: 2018-02-21

## 2018-02-21 NOTE — TELEPHONE ENCOUNTER
----- Message from Ashley Avendano R.N. sent at 2/21/2018  9:21 AM PST -----      ----- Message -----  From: Christo Garrett M.D.  Sent: 2/21/2018   8:52 AM  To: Adrienne Cruz L.P.N.    OPO shows evidence of sleep apnea.  'Refer to pulmonary

## 2018-02-23 ENCOUNTER — TELEPHONE (OUTPATIENT)
Dept: CARDIOLOGY | Facility: MEDICAL CENTER | Age: 78
End: 2018-02-23

## 2018-02-23 DIAGNOSIS — R06.83 SNORING: ICD-10-CM

## 2018-02-23 DIAGNOSIS — E87.6 HYPOKALEMIA: ICD-10-CM

## 2018-02-23 DIAGNOSIS — R09.89 ABNORMAL PULMONARY FINDING: ICD-10-CM

## 2018-02-23 DIAGNOSIS — I10 ESSENTIAL HYPERTENSION, BENIGN: ICD-10-CM

## 2018-02-23 RX ORDER — POTASSIUM CHLORIDE 600 MG/1
8 TABLET, FILM COATED, EXTENDED RELEASE ORAL DAILY
Qty: 90 TAB | Refills: 3
Start: 2018-02-23 | End: 2019-03-02 | Stop reason: SDUPTHER

## 2018-02-23 RX ORDER — IRBESARTAN AND HYDROCHLOROTHIAZIDE 150; 12.5 MG/1; MG/1
1 TABLET, FILM COATED ORAL DAILY
Qty: 90 TAB | Refills: 3 | OUTPATIENT
Start: 2018-02-23 | End: 2018-03-20

## 2018-02-23 NOTE — TELEPHONE ENCOUNTER
Per Dr. Garrett: stop Irbesartan 300mg. Start Irbesartan HCTZ 150-12.5mg. Restart Porassium 8mEq QD.  New RX called to pharmacy.  Pt. Advised.

## 2018-02-23 NOTE — TELEPHONE ENCOUNTER
"Spoke with pt. To give results, recommendations.   Referral ordered to pulmonology.   Pt. Said BP\"s are still elevated: 160's/70-80. Lowest [id number]=330. She stopped Amlodipine 2.5mg (recently RX'd by RS) because she remembered side effect of edema. She takes Coreg 6.26mg BID, Avapro 300mg QD.   "

## 2018-02-23 NOTE — TELEPHONE ENCOUNTER
Message     The referral to Pulmonology will be sent to [facility]. The contact number is [phone].

## 2018-03-05 ENCOUNTER — TELEPHONE (OUTPATIENT)
Dept: CARDIOLOGY | Facility: MEDICAL CENTER | Age: 78
End: 2018-03-05

## 2018-03-05 NOTE — TELEPHONE ENCOUNTER
PAR sent to plan:  [facility]#[id number]    Danica Rapp Key: [id number] - PA Case ID: [id number]  Status  Sent to [facility]  DrugHowardbesartan-Hydrochlorothiazide 150-12.5MG tablets  FormOptumRx Medicare Part D Electronic Prior Authorization Form

## 2018-03-20 ENCOUNTER — OFFICE VISIT (OUTPATIENT)
Dept: CARDIOLOGY | Facility: MEDICAL CENTER | Age: 78
End: 2018-03-20
Payer: MEDICARE

## 2018-03-20 VITALS
BODY MASS INDEX: 25.69 KG/M2 | DIASTOLIC BLOOD PRESSURE: 80 MMHG | SYSTOLIC BLOOD PRESSURE: 144 MMHG | HEART RATE: 70 BPM | HEIGHT: 63 IN | WEIGHT: 145 LBS

## 2018-03-20 DIAGNOSIS — I10 ESSENTIAL HYPERTENSION: Primary | ICD-10-CM

## 2018-03-20 DIAGNOSIS — I10 ESSENTIAL HYPERTENSION, BENIGN: ICD-10-CM

## 2018-03-20 DIAGNOSIS — E87.6 HYPOKALEMIA: ICD-10-CM

## 2018-03-20 DIAGNOSIS — R06.83 SNORING: ICD-10-CM

## 2018-03-20 PROCEDURE — 99214 OFFICE O/P EST MOD 30 MIN: CPT | Performed by: INTERNAL MEDICINE

## 2018-03-20 RX ORDER — VALSARTAN AND HYDROCHLOROTHIAZIDE 160; 25 MG/1; MG/1
1 TABLET ORAL DAILY
Qty: 30 TAB | Refills: 3 | Status: SHIPPED | OUTPATIENT
Start: 2018-03-20 | End: 2018-03-20 | Stop reason: SDUPTHER

## 2018-03-20 ASSESSMENT — ENCOUNTER SYMPTOMS
WEAKNESS: 0
VOMITING: 0
NEUROLOGICAL NEGATIVE: 1
BLURRED VISION: 0
PALPITATIONS: 0
CONSTITUTIONAL NEGATIVE: 1
SHORTNESS OF BREATH: 0
NAUSEA: 0

## 2018-03-20 NOTE — LETTER
[facility]   [address] [place] [address] [address]  [place] [place] [id number]  Phone: [phone]  Fax: [phone]              Danica Rapp  1940    Encounter Date: 3/20/2018    Christo Garrett M.D.          PROGRESS NOTE:  Chief Complaint   Patient presents with   • HTN (Controlled)       Subjective:   Chief complaint: Follow-up of hypertension  Danica Rapp is a 77 y.o. female who presents today primarily for follow-up of hypertension. She has recently changed to irbesartan, but her insurance would not pay for it and is in a different medication.  Interval history notable for the fact that she had an overnight oximetry which she failed with lowest oxygen saturation of 71%. Pulmonary consultation is in the works. Recent lab notable for borderline low iron saturation 14% but total iron normal at 57. Potassium was 3.6. She is not anemic.  The patient had a very difficult time with her husbands death. Her grief reaction is slowly resolving. She is planning to go to [place] with friends in mid April and will return for blood pressure checks as before she leaves the country.    Past Medical History:   Diagnosis Date   • Essential hypertension, benign 6/5/2012   • Nonspecific abnormal electrocardiogram (ECG) (EKG) 6/5/2012   • Other chest pain 6/5/2012     History reviewed. No pertinent surgical history.  History reviewed. No pertinent family history.  Social History     Social History   • Marital status:      Spouse name: N/A   • Number of children: N/A   • Years of education: N/A     Occupational History   • Not on file.     Social History Main Topics   • Smoking status: Never Smoker   • Smokeless tobacco: Never Used   • Alcohol use Not on file   • Drug use: Unknown   • Sexual activity: Not on file     Other Topics Concern   • Not on file     Social History Narrative   • No narrative on file     Allergies   Allergen Reactions   • Macrodantin [Nitrofurantoin Macrocrystal]  Itching and Swelling   • Augmentin Itching     Headaches   • Z-Pako [Azithromycin] Itching and Nausea     Outpatient Encounter Prescriptions as of 3/20/2018   Medication Sig Dispense Refill   • valsartan-hydrochlorothiazide (DIOVAN-HCT) 160-25 MG per tablet Take 1 Tab by mouth every day. 30 Tab 3   • potassium chloride (KLOR-CON) 8 MEQ tablet Take 1 Tab by mouth every day. 90 Tab 3   • carvedilol (COREG) 6.25 MG Tab Take 1 Tab by mouth 2 times a day, with meals. 180 Tab 3   • Cranberry (THERACRAN PO) Take  by mouth.     • Polyethylene Glycol 3350 (MIRALAX PO) Take  by mouth.     • alprazolam (XANAX) 0.25 MG Tab Take 1 Tab by mouth 2 times a day as needed. For anxiety.     • vitamin D (CHOLECALCIFEROL) 1000 UNIT Tab Take 1,000 Units by mouth every day.     • Docusate Calcium (STOOL SOFTENER PO) Take 1 Tab by mouth every bedtime.     • NON SPECIFIED Theracran 650mg, SI Twice a day     • Probiotic Product (PROBIOTIC DAILY PO) Take 1 Each by mouth every day.     • estradiol (VIVELLE DOT) 0.1 MG/24HR PTTW Apply 1 Patch to skin as directed every 7 days.     • Calcium Carbonate-Vitamin D (CALCIUM + D PO) Take 500 mg by mouth every day.     • Multiple Vitamin (MULTI-VITAMIN DAILY PO) Take  by mouth every day.     • estradiol (ESTRACE) 1 MG TABS Take 1 mg by mouth. Three times a week (--)     • omeprazole (PRILOSEC) 20 MG CPDR Take 20 mg by mouth every day.       • [DISCONTINUED] irbesartan-hydrochlorothiazide (AVALIDE) 150-12.5 MG per tablet Take 1 Tab by mouth every day. 90 Tab 3   • sulfamethoxazole-trimethoprim (BACTRIM DS,SEPTRA DS) 800-160 MG TABS Take 1 Tab by mouth every day.       • aspirin EC (ECOTRIN) 81 MG TBEC Take 81 mg by mouth every 48 hours. Indications: NOT TAKING       No facility-administered encounter medications on file as of 3/20/2018.      Review of Systems   Constitutional: Negative.  Negative for malaise/fatigue.   Eyes: Negative for blurred vision.   Respiratory: Negative for shortness of  "breath.    Cardiovascular: Negative for chest pain, palpitations and leg swelling.   Gastrointestinal: Negative for nausea and vomiting.   Neurological: Negative.  Negative for weakness.        Objective:   /80   Pulse 70   Ht 1.588 m (5' 2.5\")   Wt 65.8 kg (145 lb)   BMI 26.10 kg/m²      Physical Exam   Constitutional: She is oriented to person, place, and time. She appears distressed.   HENT:   Head: Normocephalic and atraumatic.   Eyes: No scleral icterus.   Neck: No JVD present.   Cardiovascular: Normal rate and regular rhythm.    No murmur heard.  Pulmonary/Chest: Breath sounds normal. She is in respiratory distress. She has no wheezes.   Musculoskeletal: She exhibits no edema.   Neurological: She is alert and oriented to person, place, and time.   Skin: Skin is warm and dry.   Psychiatric: She has a normal mood and affect.       Assessment:     1. Essential hypertension, benign     2. Hypokalemia     3. Snoring         Medical Decision Making:  Today's Assessment / Status / Plan:   Hypertension: Blood pressure my reading 160 systolic. Because of insurance coverage she would need to be taken off irbesartan and will be started on valsartan/HCTZ 160/25 mg a day. She will resume her potassium. Return in 2 weeks for blood pressure check and titration of her valsartan. Parenthetically, she did not tolerate amlodipine because of leg swelling.  Psychologically she is doing much better but still misses her .      Jill Adler M.D.  [address] [place] [id number]  VIA Facsimile: [phone]                 "

## 2018-03-20 NOTE — PROGRESS NOTES
Chief Complaint   Patient presents with   • HTN (Controlled)       Subjective:   Chief complaint: Follow-up of hypertension  Danica Rapp is a 77 y.o. female who presents today primarily for follow-up of hypertension. She has recently changed to irbesartan, but her insurance would not pay for it and is in a different medication.  Interval history notable for the fact that she had an overnight oximetry which she failed with lowest oxygen saturation of 71%. Pulmonary consultation is in the works. Recent lab notable for borderline low iron saturation 14% but total iron normal at 57. Potassium was 3.6. She is not anemic.  The patient had a very difficult time with her husbands death. Her grief reaction is slowly resolving. She is planning to go to [place] with friends in mid April and will return for blood pressure checks as before she leaves the country.    Past Medical History:   Diagnosis Date   • Essential hypertension, benign 6/5/2012   • Nonspecific abnormal electrocardiogram (ECG) (EKG) 6/5/2012   • Other chest pain 6/5/2012     History reviewed. No pertinent surgical history.  History reviewed. No pertinent family history.  Social History     Social History   • Marital status:      Spouse name: N/A   • Number of children: N/A   • Years of education: N/A     Occupational History   • Not on file.     Social History Main Topics   • Smoking status: Never Smoker   • Smokeless tobacco: Never Used   • Alcohol use Not on file   • Drug use: Unknown   • Sexual activity: Not on file     Other Topics Concern   • Not on file     Social History Narrative   • No narrative on file     Allergies   Allergen Reactions   • Macrodantin [Nitrofurantoin Macrocrystal] Itching and Swelling   • Augmentin Itching     Headaches   • Z-Pako [Azithromycin] Itching and Nausea     Outpatient Encounter Prescriptions as of 3/20/2018   Medication Sig Dispense Refill   • valsartan-hydrochlorothiazide (DIOVAN-HCT) 160-25 MG per tablet Take 1  "Tab by mouth every day. 30 Tab 3   • potassium chloride (KLOR-CON) 8 MEQ tablet Take 1 Tab by mouth every day. 90 Tab 3   • carvedilol (COREG) 6.25 MG Tab Take 1 Tab by mouth 2 times a day, with meals. 180 Tab 3   • Cranberry (THERACRAN PO) Take  by mouth.     • Polyethylene Glycol 3350 (MIRALAX PO) Take  by mouth.     • alprazolam (XANAX) 0.25 MG Tab Take 1 Tab by mouth 2 times a day as needed. For anxiety.     • vitamin D (CHOLECALCIFEROL) 1000 UNIT Tab Take 1,000 Units by mouth every day.     • Docusate Calcium (STOOL SOFTENER PO) Take 1 Tab by mouth every bedtime.     • NON SPECIFIED Theracran 650mg, SI Twice a day     • Probiotic Product (PROBIOTIC DAILY PO) Take 1 Each by mouth every day.     • estradiol (VIVELLE DOT) 0.1 MG/24HR PTTW Apply 1 Patch to skin as directed every 7 days.     • Calcium Carbonate-Vitamin D (CALCIUM + D PO) Take 500 mg by mouth every day.     • Multiple Vitamin (MULTI-VITAMIN DAILY PO) Take  by mouth every day.     • estradiol (ESTRACE) 1 MG TABS Take 1 mg by mouth. Three times a week (--)     • omeprazole (PRILOSEC) 20 MG CPDR Take 20 mg by mouth every day.       • [DISCONTINUED] irbesartan-hydrochlorothiazide (AVALIDE) 150-12.5 MG per tablet Take 1 Tab by mouth every day. 90 Tab 3   • sulfamethoxazole-trimethoprim (BACTRIM DS,SEPTRA DS) 800-160 MG TABS Take 1 Tab by mouth every day.       • aspirin EC (ECOTRIN) 81 MG TBEC Take 81 mg by mouth every 48 hours. Indications: NOT TAKING       No facility-administered encounter medications on file as of 3/20/2018.      Review of Systems   Constitutional: Negative.  Negative for malaise/fatigue.   Eyes: Negative for blurred vision.   Respiratory: Negative for shortness of breath.    Cardiovascular: Negative for chest pain, palpitations and leg swelling.   Gastrointestinal: Negative for nausea and vomiting.   Neurological: Negative.  Negative for weakness.        Objective:   /80   Pulse 70   Ht 1.588 m (5' 2.5\")   Wt 65.8 kg " (145 lb)   BMI 26.10 kg/m²     Physical Exam   Constitutional: She is oriented to person, place, and time. She appears distressed.   HENT:   Head: Normocephalic and atraumatic.   Eyes: No scleral icterus.   Neck: No JVD present.   Cardiovascular: Normal rate and regular rhythm.    No murmur heard.  Pulmonary/Chest: Breath sounds normal. She is in respiratory distress. She has no wheezes.   Musculoskeletal: She exhibits no edema.   Neurological: She is alert and oriented to person, place, and time.   Skin: Skin is warm and dry.   Psychiatric: She has a normal mood and affect.       Assessment:     1. Essential hypertension, benign     2. Hypokalemia     3. Snoring         Medical Decision Making:  Today's Assessment / Status / Plan:   Hypertension: Blood pressure my reading 160 systolic. Because of insurance coverage she would need to be taken off irbesartan and will be started on valsartan/HCTZ 160/25 mg a day. She will resume her potassium. Return in 2 weeks for blood pressure check and titration of her valsartan. Parenthetically, she did not tolerate amlodipine because of leg swelling.  Psychologically she is doing much better but still misses her .

## 2018-03-20 NOTE — PROGRESS NOTES
Chief Complaint   Patient presents with   • HTN (Controlled)       Subjective:   Danica Rapp is a 77 y.o. female who presents today     Past Medical History:   Diagnosis Date   • Essential hypertension, benign 2012   • Nonspecific abnormal electrocardiogram (ECG) (EKG) 2012   • Other chest pain 2012     History reviewed. No pertinent surgical history.  History reviewed. No pertinent family history.  Social History     Social History   • Marital status:      Spouse name: N/A   • Number of children: N/A   • Years of education: N/A     Occupational History   • Not on file.     Social History Main Topics   • Smoking status: Never Smoker   • Smokeless tobacco: Never Used   • Alcohol use Not on file   • Drug use: Unknown   • Sexual activity: Not on file     Other Topics Concern   • Not on file     Social History Narrative   • No narrative on file     Allergies   Allergen Reactions   • Macrodantin [Nitrofurantoin Macrocrystal] Itching and Swelling   • Augmentin Itching     Headaches   • Z-Pako [Azithromycin] Itching and Nausea     Outpatient Encounter Prescriptions as of 3/20/2018   Medication Sig Dispense Refill   • valsartan-hydrochlorothiazide (DIOVAN-HCT) 160-25 MG per tablet Take 1 Tab by mouth every day. 30 Tab 3   • potassium chloride (KLOR-CON) 8 MEQ tablet Take 1 Tab by mouth every day. 90 Tab 3   • carvedilol (COREG) 6.25 MG Tab Take 1 Tab by mouth 2 times a day, with meals. 180 Tab 3   • Cranberry (THERACRAN PO) Take  by mouth.     • Polyethylene Glycol 3350 (MIRALAX PO) Take  by mouth.     • alprazolam (XANAX) 0.25 MG Tab Take 1 Tab by mouth 2 times a day as needed. For anxiety.     • vitamin D (CHOLECALCIFEROL) 1000 UNIT Tab Take 1,000 Units by mouth every day.     • Docusate Calcium (STOOL SOFTENER PO) Take 1 Tab by mouth every bedtime.     • NON SPECIFIED Theracran 650mg, SI Twice a day     • Probiotic Product (PROBIOTIC DAILY PO) Take 1 Each by mouth every day.     • estradiol  "(VIVELLE DOT) 0.1 MG/24HR PTTW Apply 1 Patch to skin as directed every 7 days.     • Calcium Carbonate-Vitamin D (CALCIUM + D PO) Take 500 mg by mouth every day.     • Multiple Vitamin (MULTI-VITAMIN DAILY PO) Take  by mouth every day.     • estradiol (ESTRACE) 1 MG TABS Take 1 mg by mouth. Three times a week (M-W-F)     • omeprazole (PRILOSEC) 20 MG CPDR Take 20 mg by mouth every day.       • [DISCONTINUED] irbesartan-hydrochlorothiazide (AVALIDE) 150-12.5 MG per tablet Take 1 Tab by mouth every day. 90 Tab 3   • sulfamethoxazole-trimethoprim (BACTRIM DS,SEPTRA DS) 800-160 MG TABS Take 1 Tab by mouth every day.       • aspirin EC (ECOTRIN) 81 MG TBEC Take 81 mg by mouth every 48 hours. Indications: NOT TAKING       No facility-administered encounter medications on file as of 3/20/2018.      ROS     Objective:   /80   Pulse 70   Ht 1.588 m (5' 2.5\")   Wt 65.8 kg (145 lb)   BMI 26.10 kg/m²     Physical Exam    Assessment:     1. Essential hypertension, benign     2. Hypokalemia     3. Snoring         Medical Decision Making:  Today's Assessment / Status / Plan:     "

## 2018-03-22 RX ORDER — VALSARTAN AND HYDROCHLOROTHIAZIDE 160; 25 MG/1; MG/1
TABLET ORAL
Qty: 90 TAB | Refills: 0 | Status: SHIPPED | OUTPATIENT
Start: 2018-03-22 | End: 2018-04-04 | Stop reason: SDUPTHER

## 2018-04-04 ENCOUNTER — APPOINTMENT (OUTPATIENT)
Dept: CARDIOLOGY | Facility: MEDICAL CENTER | Age: 78
End: 2018-04-04
Payer: MEDICARE

## 2018-04-04 ENCOUNTER — OFFICE VISIT (OUTPATIENT)
Dept: CARDIOLOGY | Facility: MEDICAL CENTER | Age: 78
End: 2018-04-04
Payer: MEDICARE

## 2018-04-04 VITALS
WEIGHT: 145 LBS | HEIGHT: 63 IN | HEART RATE: 80 BPM | BODY MASS INDEX: 25.69 KG/M2 | SYSTOLIC BLOOD PRESSURE: 162 MMHG | DIASTOLIC BLOOD PRESSURE: 78 MMHG | OXYGEN SATURATION: 93 %

## 2018-04-04 DIAGNOSIS — E87.6 HYPOKALEMIA: ICD-10-CM

## 2018-04-04 DIAGNOSIS — I10 ESSENTIAL HYPERTENSION: ICD-10-CM

## 2018-04-04 DIAGNOSIS — I10 ESSENTIAL HYPERTENSION, BENIGN: ICD-10-CM

## 2018-04-04 PROCEDURE — 99214 OFFICE O/P EST MOD 30 MIN: CPT | Performed by: NURSE PRACTITIONER

## 2018-04-04 RX ORDER — VALSARTAN AND HYDROCHLOROTHIAZIDE 160; 25 MG/1; MG/1
1 TABLET ORAL
Qty: 90 TAB | Refills: 3 | Status: SHIPPED | OUTPATIENT
Start: 2018-04-04 | End: 2018-07-11

## 2018-04-04 RX ORDER — CARVEDILOL 12.5 MG/1
12.5 TABLET ORAL 2 TIMES DAILY WITH MEALS
Qty: 180 TAB | Refills: 3 | Status: SHIPPED | OUTPATIENT
Start: 2018-04-04 | End: 2019-04-15 | Stop reason: SDUPTHER

## 2018-04-04 ASSESSMENT — ENCOUNTER SYMPTOMS
COUGH: 0
ORTHOPNEA: 0
ABDOMINAL PAIN: 0
MYALGIAS: 0
PALPITATIONS: 0
PND: 0
DEPRESSION: 1
WEAKNESS: 0
CLAUDICATION: 0
DIZZINESS: 0
SHORTNESS OF BREATH: 0

## 2018-04-04 NOTE — PROGRESS NOTES
Chief Complaint   Patient presents with   • HTN (Controlled)     follow up       Subjective:   Danica Rapp is a 77 y.o. female who presents today to follow-up on hypertension. She was last seen on March 20, 2018 by Dr. Garrett who adjusted her medications.    Irbesartan was not covered on her plan therefore he changed her to valsartan/ mg/25 mg daily. She has a history of hypokalemia when on diuretics therefore he added back her potassium. She's been tolerating this medication well.    Amlodipine was discontinued due to ankle swelling. She thought she had some nausea from taking carvedilol but she was not taking her pills with food every time. She denies any nausea currently.    She's been checking her blood pressure at home with an arm cuff and states that it runs 160/70 consistently.    She is planning on doing a 5 week trip to [place] leaving mid April. She continues to grieve the loss of her  and her mother-in-law. She is not doing any counseling.    Past Medical History:   Diagnosis Date   • Essential hypertension, benign 6/5/2012   • Nonspecific abnormal electrocardiogram (ECG) (EKG) 6/5/2012   • Other chest pain 6/5/2012     History reviewed. No pertinent surgical history.  History reviewed. No pertinent family history.  Social History     Social History   • Marital status:      Spouse name: N/A   • Number of children: N/A   • Years of education: N/A     Occupational History   • Not on file.     Social History Main Topics   • Smoking status: Never Smoker   • Smokeless tobacco: Never Used   • Alcohol use No   • Drug use: No   • Sexual activity: Not on file     Other Topics Concern   • Not on file     Social History Narrative   • No narrative on file     Allergies   Allergen Reactions   • Macrodantin [Nitrofurantoin Macrocrystal] Itching and Swelling   • Amlodipine Swelling   • Augmentin Itching     Headaches   • Z-Pako [Azithromycin] Itching and Nausea     Outpatient Encounter Prescriptions  as of 2018   Medication Sig Dispense Refill   • valsartan-hydrochlorothiazide (DIOVAN-HCT) 160-25 MG per tablet Take 1 Tab by mouth every day. 90 Tab 3   • carvedilol (COREG) 12.5 MG Tab Take 1 Tab by mouth 2 times a day, with meals. 180 Tab 3   • potassium chloride (KLOR-CON) 8 MEQ tablet Take 1 Tab by mouth every day. 90 Tab 3   • Cranberry (THERACRAN PO) Take  by mouth.     • vitamin D (CHOLECALCIFEROL) 1000 UNIT Tab Take 1,000 Units by mouth every day.     • Probiotic Product (PROBIOTIC DAILY PO) Take 1 Each by mouth every day.     • estradiol (VIVELLE DOT) 0.1 MG/24HR PTTW Apply 1 Patch to skin as directed every 7 days.     • Calcium Carbonate-Vitamin D (CALCIUM + D PO) Take 500 mg by mouth every day.     • Multiple Vitamin (MULTI-VITAMIN DAILY PO) Take  by mouth every day.     • estradiol (ESTRACE) 1 MG TABS Take 1 mg by mouth. Three times a week (--)     • omeprazole (PRILOSEC) 20 MG CPDR Take 20 mg by mouth every day.       • [DISCONTINUED] valsartan-hydrochlorothiazide (DIOVAN-HCT) 160-25 MG per tablet TAKE 1 TABLET BY MOUTH EVERY DAY 90 Tab 0   • [DISCONTINUED] carvedilol (COREG) 6.25 MG Tab Take 1 Tab by mouth 2 times a day, with meals. 180 Tab 3   • [DISCONTINUED] Polyethylene Glycol 3350 (MIRALAX PO) Take  by mouth.     • [DISCONTINUED] alprazolam (XANAX) 0.25 MG Tab Take 1 Tab by mouth 2 times a day as needed. For anxiety.     • [DISCONTINUED] Docusate Calcium (STOOL SOFTENER PO) Take 1 Tab by mouth every bedtime.     • [DISCONTINUED] NON SPECIFIED Theracran 650mg, SI Twice a day     • [DISCONTINUED] sulfamethoxazole-trimethoprim (BACTRIM DS,SEPTRA DS) 800-160 MG TABS Take 1 Tab by mouth every day.       • [DISCONTINUED] aspirin EC (ECOTRIN) 81 MG TBEC Take 81 mg by mouth every 48 hours. Indications: NOT TAKING       No facility-administered encounter medications on file as of 2018.      Review of Systems   Constitutional: Negative for malaise/fatigue.   Respiratory: Negative for cough  "and shortness of breath.    Cardiovascular: Negative for chest pain, palpitations, orthopnea, claudication, leg swelling and PND.   Gastrointestinal: Negative for abdominal pain.   Musculoskeletal: Negative for myalgias.   Neurological: Negative for dizziness and weakness.   Psychiatric/Behavioral: Positive for depression (still grieving).        Objective:   BP (!) 162/78   Pulse 80   Ht 1.588 m (5' 2.5\")   Wt 65.8 kg (145 lb)   SpO2 93%   BMI 26.10 kg/m²     Physical Exam   Constitutional: She is oriented to person, place, and time. She appears well-developed and well-nourished.   HENT:   Head: Normocephalic.   Eyes: EOM are normal.   Neck: No JVD present.   Cardiovascular: Normal rate, regular rhythm and normal heart sounds.    Pulmonary/Chest: Effort normal and breath sounds normal.   Abdominal: Soft. Bowel sounds are normal.   Musculoskeletal: She exhibits no edema.   Neurological: She is alert and oriented to person, place, and time.   Skin: Skin is warm and dry.   Psychiatric: She has a normal mood and affect.     Results for MILDRED GIBSON (MRN [id number]) as of 4/4/2018 16:08   Ref. Range 2/12/2018 16:45   WBC Latest Ref Range: 4.8 - 10.8 K/uL 6.2   RBC Latest Ref Range: 4.20 - 5.40 M/uL 4.39   Hemoglobin Latest Ref Range: 12.0 - 16.0 g/dL 13.0   Hematocrit Latest Ref Range: 37.0 - 47.0 % 39.0   MCV Latest Ref Range: 81.4 - 97.8 fL 88.8   MCH Latest Ref Range: 27.0 - 33.0 pg 29.6   MCHC Latest Ref Range: 33.6 - 35.0 g/dL 33.3 (L)   RDW Latest Ref Range: 35.9 - 50.0 fL 45.8   Platelet Count Latest Ref Range: 164 - 446 K/uL 403   MPV Latest Ref Range: 9.0 - 12.9 fL 10.7   Neutrophils-Polys Latest Ref Range: 44.00 - 72.00 % 62.80   Neutrophils (Absolute) Latest Ref Range: 2.00 - 7.15 K/uL 3.89   Lymphocytes Latest Ref Range: 22.00 - 41.00 % 26.30   Lymphs (Absolute) Latest Ref Range: 1.00 - 4.80 K/uL 1.63   Monocytes Latest Ref Range: 0.00 - 13.40 % 8.50   Monos (Absolute) Latest Ref Range: 0.00 - 0.85 K/uL " 0.53   Eosinophils Latest Ref Range: 0.00 - 6.90 % 1.60   Eos (Absolute) Latest Ref Range: 0.00 - 0.51 K/uL 0.10   Basophils Latest Ref Range: 0.00 - 1.80 % 0.60   Baso (Absolute) Latest Ref Range: 0.00 - 0.12 K/uL 0.04   Immature Granulocytes Latest Ref Range: 0.00 - 0.90 % 0.20   Immature Granulocytes (abs) Latest Ref Range: 0.00 - 0.11 K/uL 0.01   Nucleated RBC Latest Units: /100 WBC 0.00   NRBC (Absolute) Latest Units: K/uL 0.00   Sodium Latest Ref Range: 135 - 145 mmol/L 135   Potassium Latest Ref Range: 3.6 - 5.5 mmol/L 3.6   Chloride Latest Ref Range: 96 - 112 mmol/L 98   Co2 Latest Ref Range: 20 - 33 mmol/L 29   Anion Gap Latest Ref Range: 0.0 - 11.9  8.0     Assessment:     1. Essential hypertension, benign     2. Hypokalemia     3. Essential hypertension  valsartan-hydrochlorothiazide (DIOVAN-HCT) 160-25 MG per tablet       Medical Decision Making:  Today's Assessment / Status / Plan:     Hypertension: Incompletely controlled. I will have her increase carvedilol to 12.5 mg twice a day. I think she will tolerate this without nausea she takes the medications with breakfast and dinner. She is agreeable.    It she needs further blood pressure control, I would consider giving her valsartan 160 mg twice a day and hydrochlorothiazide 25 mg separately. She will continue to monitor her blood pressure.    Hypokalemia: She will continue taking the potassium since she tends to run a low potassium when she is taking diuretics.    She will follow-up in 3 months with Dr. Garrett or myself. She will follow-up sooner if problems.    Collaborating Provider: Dr. Garrett.

## 2018-04-04 NOTE — LETTER
[facility]   [address] [place] [address] [address]  [place] [address]  Phone: [phone]  Fax: [phone]              Danica Rapp  1940    Encounter Date: 4/4/2018    COOKIE Muñiz          PROGRESS NOTE:  Chief Complaint   Patient presents with   • HTN (Controlled)     follow up       Subjective:   Danica Rapp is a 77 y.o. female who presents today to follow-up on hypertension. She was last seen on March 20, 2018 by Dr. Garrett who adjusted her medications.    Irbesartan was not covered on her plan therefore he changed her to valsartan/ mg/25 mg daily. She has a history of hypokalemia when on diuretics therefore he added back her potassium. She's been tolerating this medication well.    Amlodipine was discontinued due to ankle swelling. She thought she had some nausea from taking carvedilol but she was not taking her pills with food every time. She denies any nausea currently.    She's been checking her blood pressure at home with an arm cuff and states that it runs 160/70 consistently.    She is planning on doing a 5 week trip to [place] leaving mid April. She continues to grieve the loss of her  and her mother-in-law. She is not doing any counseling.    Past Medical History:   Diagnosis Date   • Essential hypertension, benign 6/5/2012   • Nonspecific abnormal electrocardiogram (ECG) (EKG) 6/5/2012   • Other chest pain 6/5/2012     History reviewed. No pertinent surgical history.  History reviewed. No pertinent family history.  Social History     Social History   • Marital status:      Spouse name: N/A   • Number of children: N/A   • Years of education: N/A     Occupational History   • Not on file.     Social History Main Topics   • Smoking status: Never Smoker   • Smokeless tobacco: Never Used   • Alcohol use No   • Drug use: No   • Sexual activity: Not on file     Other Topics Concern   • Not on file     Social History Narrative   • No  narrative on file     Allergies   Allergen Reactions   • Macrodantin [Nitrofurantoin Macrocrystal] Itching and Swelling   • Amlodipine Swelling   • Augmentin Itching     Headaches   • Z-Pako [Azithromycin] Itching and Nausea     Outpatient Encounter Prescriptions as of 2018   Medication Sig Dispense Refill   • valsartan-hydrochlorothiazide (DIOVAN-HCT) 160-25 MG per tablet Take 1 Tab by mouth every day. 90 Tab 3   • carvedilol (COREG) 12.5 MG Tab Take 1 Tab by mouth 2 times a day, with meals. 180 Tab 3   • potassium chloride (KLOR-CON) 8 MEQ tablet Take 1 Tab by mouth every day. 90 Tab 3   • Cranberry (THERACRAN PO) Take  by mouth.     • vitamin D (CHOLECALCIFEROL) 1000 UNIT Tab Take 1,000 Units by mouth every day.     • Probiotic Product (PROBIOTIC DAILY PO) Take 1 Each by mouth every day.     • estradiol (VIVELLE DOT) 0.1 MG/24HR PTTW Apply 1 Patch to skin as directed every 7 days.     • Calcium Carbonate-Vitamin D (CALCIUM + D PO) Take 500 mg by mouth every day.     • Multiple Vitamin (MULTI-VITAMIN DAILY PO) Take  by mouth every day.     • estradiol (ESTRACE) 1 MG TABS Take 1 mg by mouth. Three times a week (--)     • omeprazole (PRILOSEC) 20 MG CPDR Take 20 mg by mouth every day.       • [DISCONTINUED] valsartan-hydrochlorothiazide (DIOVAN-HCT) 160-25 MG per tablet TAKE 1 TABLET BY MOUTH EVERY DAY 90 Tab 0   • [DISCONTINUED] carvedilol (COREG) 6.25 MG Tab Take 1 Tab by mouth 2 times a day, with meals. 180 Tab 3   • [DISCONTINUED] Polyethylene Glycol 3350 (MIRALAX PO) Take  by mouth.     • [DISCONTINUED] alprazolam (XANAX) 0.25 MG Tab Take 1 Tab by mouth 2 times a day as needed. For anxiety.     • [DISCONTINUED] Docusate Calcium (STOOL SOFTENER PO) Take 1 Tab by mouth every bedtime.     • [DISCONTINUED] NON SPECIFIED Theracran 650mg, SI Twice a day     • [DISCONTINUED] sulfamethoxazole-trimethoprim (BACTRIM DS,SEPTRA DS) 800-160 MG TABS Take 1 Tab by mouth every day.       • [DISCONTINUED] aspirin EC  "(ECOTRIN) 81 MG TBEC Take 81 mg by mouth every 48 hours. Indications: NOT TAKING       No facility-administered encounter medications on file as of 4/4/2018.      Review of Systems   Constitutional: Negative for malaise/fatigue.   Respiratory: Negative for cough and shortness of breath.    Cardiovascular: Negative for chest pain, palpitations, orthopnea, claudication, leg swelling and PND.   Gastrointestinal: Negative for abdominal pain.   Musculoskeletal: Negative for myalgias.   Neurological: Negative for dizziness and weakness.   Psychiatric/Behavioral: Positive for depression (still grieving).        Objective:   BP (!) 162/78   Pulse 80   Ht 1.588 m (5' 2.5\")   Wt 65.8 kg (145 lb)   SpO2 93%   BMI 26.10 kg/m²      Physical Exam   Constitutional: She is oriented to person, place, and time. She appears well-developed and well-nourished.   HENT:   Head: Normocephalic.   Eyes: EOM are normal.   Neck: No JVD present.   Cardiovascular: Normal rate, regular rhythm and normal heart sounds.    Pulmonary/Chest: Effort normal and breath sounds normal.   Abdominal: Soft. Bowel sounds are normal.   Musculoskeletal: She exhibits no edema.   Neurological: She is alert and oriented to person, place, and time.   Skin: Skin is warm and dry.   Psychiatric: She has a normal mood and affect.     Results for MILDRED GIBSON (MRN [id number]) as of 4/4/2018 16:08   Ref. Range 2/12/2018 16:45   WBC Latest Ref Range: 4.8 - 10.8 K/uL 6.2   RBC Latest Ref Range: 4.20 - 5.40 M/uL 4.39   Hemoglobin Latest Ref Range: 12.0 - 16.0 g/dL 13.0   Hematocrit Latest Ref Range: 37.0 - 47.0 % 39.0   MCV Latest Ref Range: 81.4 - 97.8 fL 88.8   MCH Latest Ref Range: 27.0 - 33.0 pg 29.6   MCHC Latest Ref Range: 33.6 - 35.0 g/dL 33.3 (L)   RDW Latest Ref Range: 35.9 - 50.0 fL 45.8   Platelet Count Latest Ref Range: 164 - 446 K/uL 403   MPV Latest Ref Range: 9.0 - 12.9 fL 10.7   Neutrophils-Polys Latest Ref Range: 44.00 - 72.00 % 62.80   Neutrophils " (Absolute) Latest Ref Range: 2.00 - 7.15 K/uL 3.89   Lymphocytes Latest Ref Range: 22.00 - 41.00 % 26.30   Lymphs (Absolute) Latest Ref Range: 1.00 - 4.80 K/uL 1.63   Monocytes Latest Ref Range: 0.00 - 13.40 % 8.50   Monos (Absolute) Latest Ref Range: 0.00 - 0.85 K/uL 0.53   Eosinophils Latest Ref Range: 0.00 - 6.90 % 1.60   Eos (Absolute) Latest Ref Range: 0.00 - 0.51 K/uL 0.10   Basophils Latest Ref Range: 0.00 - 1.80 % 0.60   Baso (Absolute) Latest Ref Range: 0.00 - 0.12 K/uL 0.04   Immature Granulocytes Latest Ref Range: 0.00 - 0.90 % 0.20   Immature Granulocytes (abs) Latest Ref Range: 0.00 - 0.11 K/uL 0.01   Nucleated RBC Latest Units: /100 WBC 0.00   NRBC (Absolute) Latest Units: K/uL 0.00   Sodium Latest Ref Range: 135 - 145 mmol/L 135   Potassium Latest Ref Range: 3.6 - 5.5 mmol/L 3.6   Chloride Latest Ref Range: 96 - 112 mmol/L 98   Co2 Latest Ref Range: 20 - 33 mmol/L 29   Anion Gap Latest Ref Range: 0.0 - 11.9  8.0     Assessment:     1. Essential hypertension, benign     2. Hypokalemia     3. Essential hypertension  valsartan-hydrochlorothiazide (DIOVAN-HCT) 160-25 MG per tablet       Medical Decision Making:  Today's Assessment / Status / Plan:     Hypertension: Incompletely controlled. I will have her increase carvedilol to 12.5 mg twice a day. I think she will tolerate this without nausea she takes the medications with breakfast and dinner. She is agreeable.    It she needs further blood pressure control, I would consider giving her valsartan 160 mg twice a day and hydrochlorothiazide 25 mg separately. She will continue to monitor her blood pressure.    Hypokalemia: She will continue taking the potassium since she tends to run a low potassium when she is taking diuretics.    She will follow-up in 3 months with Dr. Garrett or myself. She will follow-up sooner if problems.    Collaborating Provider: Dr. Garrett.        No Recipients

## 2018-07-11 ENCOUNTER — OFFICE VISIT (OUTPATIENT)
Dept: CARDIOLOGY | Facility: MEDICAL CENTER | Age: 78
End: 2018-07-11
Payer: MEDICARE

## 2018-07-11 VITALS
DIASTOLIC BLOOD PRESSURE: 84 MMHG | HEIGHT: 63 IN | WEIGHT: 150 LBS | HEART RATE: 78 BPM | OXYGEN SATURATION: 97 % | SYSTOLIC BLOOD PRESSURE: 160 MMHG | BODY MASS INDEX: 26.58 KG/M2

## 2018-07-11 DIAGNOSIS — I10 ESSENTIAL HYPERTENSION: ICD-10-CM

## 2018-07-11 DIAGNOSIS — I10 ESSENTIAL HYPERTENSION, BENIGN: ICD-10-CM

## 2018-07-11 DIAGNOSIS — M76.31 ILIOTIBIAL BAND SYNDROME OF RIGHT SIDE: ICD-10-CM

## 2018-07-11 PROCEDURE — 99214 OFFICE O/P EST MOD 30 MIN: CPT | Performed by: INTERNAL MEDICINE

## 2018-07-11 RX ORDER — VALSARTAN AND HYDROCHLOROTHIAZIDE 320; 12.5 MG/1; MG/1
1 TABLET, FILM COATED ORAL DAILY
Qty: 30 TAB | Refills: 3 | Status: SHIPPED | OUTPATIENT
Start: 2018-07-11 | End: 2018-09-28

## 2018-07-11 RX ORDER — VALSARTAN AND HYDROCHLOROTHIAZIDE 320; 12.5 MG/1; MG/1
TABLET, FILM COATED ORAL
Refills: 3 | OUTPATIENT
Start: 2018-07-11

## 2018-07-11 ASSESSMENT — ENCOUNTER SYMPTOMS
SHORTNESS OF BREATH: 0
PALPITATIONS: 0
BLURRED VISION: 0
NAUSEA: 0
NEUROLOGICAL NEGATIVE: 1
WEAKNESS: 0
VOMITING: 0
CONSTITUTIONAL NEGATIVE: 1

## 2018-07-11 NOTE — LETTER
[facility]   [address] [facility] [address]  [place] [address]  Phone: [phone]  Fax: [phone]              Danica Rapp  1940    Encounter Date: 7/11/2018    Christo Garrett M.D.          PROGRESS NOTE:  Chief Complaint   Patient presents with   • Hypertension     Follow up       Subjective:   Danica Rapp is a 77 y.o. female who presents today primarily for follow-up of hypertension.  She returned from her trip to [place] and had a good time.  The patient been walking more and exercising.  Her blood pressures have been difficult to control.  Also she complains of localized right lateral thigh pain.  She had an overnight oximetry performed over the winter and this was abnormal.  The patient was referred to pulmonary for evaluation in February but the referral was never finalized.      Past Medical History:   Diagnosis Date   • Essential hypertension, benign 6/5/2012   • Nonspecific abnormal electrocardiogram (ECG) (EKG) 6/5/2012   • Other chest pain 6/5/2012     History reviewed. No pertinent surgical history.  History reviewed. No pertinent family history.  Social History     Social History   • Marital status:      Spouse name: N/A   • Number of children: N/A   • Years of education: N/A     Occupational History   • Not on file.     Social History Main Topics   • Smoking status: Never Smoker   • Smokeless tobacco: Never Used   • Alcohol use No   • Drug use: No   • Sexual activity: Not on file     Other Topics Concern   • Not on file     Social History Narrative   • No narrative on file     Allergies   Allergen Reactions   • Macrodantin [Nitrofurantoin Macrocrystal] Itching and Swelling   • Amlodipine Swelling   • Augmentin Itching     Headaches   • Z-Pako [Azithromycin] Itching and Nausea     Outpatient Encounter Prescriptions as of 7/11/2018   Medication Sig Dispense Refill   • valsartan-hydrochlorothiazide (DIOVAN-HCT) 320-12.5 MG per tablet Take 1 Tab  "by mouth every day. 30 Tab 3   • carvedilol (COREG) 12.5 MG Tab Take 1 Tab by mouth 2 times a day, with meals. 180 Tab 3   • potassium chloride (KLOR-CON) 8 MEQ tablet Take 1 Tab by mouth every day. 90 Tab 3   • Cranberry (THERACRAN PO) Take  by mouth.     • vitamin D (CHOLECALCIFEROL) 1000 UNIT Tab Take 1,000 Units by mouth every day.     • Probiotic Product (PROBIOTIC DAILY PO) Take 1 Each by mouth every day.     • estradiol (VIVELLE DOT) 0.1 MG/24HR PTTW Apply 1 Patch to skin as directed every 7 days.     • Calcium Carbonate-Vitamin D (CALCIUM + D PO) Take 500 mg by mouth every day.     • Multiple Vitamin (MULTI-VITAMIN DAILY PO) Take  by mouth every day.     • estradiol (ESTRACE) 1 MG TABS Take 1 mg by mouth. Three times a week (M-W-F)     • omeprazole (PRILOSEC) 20 MG CPDR Take 20 mg by mouth every day.       • [DISCONTINUED] valsartan-hydrochlorothiazide (DIOVAN-HCT) 160-25 MG per tablet Take 1 Tab by mouth every day. 90 Tab 3     No facility-administered encounter medications on file as of 7/11/2018.      Review of Systems   Constitutional: Negative.  Negative for malaise/fatigue.   Eyes: Negative for blurred vision.   Respiratory: Negative for shortness of breath.    Cardiovascular: Negative for chest pain, palpitations and leg swelling.   Gastrointestinal: Negative for nausea and vomiting.   Musculoskeletal:        Right lateral thigh pain.   Neurological: Negative.  Negative for weakness.        Objective:   /84   Pulse 78   Ht 1.588 m (5' 2.5\")   Wt 68 kg (150 lb)   SpO2 97%   BMI 27.00 kg/m²      Physical Exam   Constitutional: She is oriented to person, place, and time. No distress.   HENT:   Head: Normocephalic and atraumatic.   Eyes: Pupils are equal, round, and reactive to light. No scleral icterus.   Neck: No JVD present.   Cardiovascular: Normal rate and regular rhythm.    No murmur heard.  Pulmonary/Chest: No respiratory distress. She has no wheezes.   Abdominal: Soft. She exhibits no " distension. There is no tenderness.   Musculoskeletal: She exhibits no edema.   Neurological: She is alert and oriented to person, place, and time.   Skin: Skin is warm.   Psychiatric: She has a normal mood and affect.       Assessment:     1. Essential hypertension, benign  REFERRAL TO PHYSICAL THERAPY Reason for Therapy: Eval/Treat/Report   2. Iliotibial band syndrome of right side  REFERRAL TO PHYSICAL THERAPY Reason for Therapy: Eval/Treat/Report   3. Essential hypertension         Medical Decision Making:  Today's Assessment / Status / Plan:   Hypertension: Difficult to control.  She was changed from Mindy Valera because of insurance reasons.  Her blood pressure is 175 systolic.  Valsartan will be increased to 325 mg/12.5 mg of hydrochlorothiazide.  Keep carvedilol to same dose.  Return for blood pressure check in a couple of months.    Iliotibial pain.  The patient has pain to palpation on her IT band.  It appears that she has iliotibial pain and will be referred to physical therapy.    Sleep apnea: Patient failed her sleep study back in the winter.  The referral to pulmonary never materialize and will we will try to expedite that.  Return for blood pressure check.        Jill Adler M.D.  [address] [place] [id number]  VIA Facsimile: [phone]

## 2018-07-11 NOTE — TELEPHONE ENCOUNTER
The referral to Physical Therapy will be sent to [facility]. The contact number is [phone].   ==============================================    Called pt and left vm w/contact info.

## 2018-07-11 NOTE — PROGRESS NOTES
Chief Complaint   Patient presents with   • Hypertension     Follow up       Subjective:   Danica Rapp is a 77 y.o. female who presents today primarily for follow-up of hypertension.  She returned from her trip to [place] and had a good time.  The patient been walking more and exercising.  Her blood pressures have been difficult to control.  Also she complains of localized right lateral thigh pain.  She had an overnight oximetry performed over the winter and this was abnormal.  The patient was referred to pulmonary for evaluation in February but the referral was never finalized.      Past Medical History:   Diagnosis Date   • Essential hypertension, benign 6/5/2012   • Nonspecific abnormal electrocardiogram (ECG) (EKG) 6/5/2012   • Other chest pain 6/5/2012     History reviewed. No pertinent surgical history.  History reviewed. No pertinent family history.  Social History     Social History   • Marital status:      Spouse name: N/A   • Number of children: N/A   • Years of education: N/A     Occupational History   • Not on file.     Social History Main Topics   • Smoking status: Never Smoker   • Smokeless tobacco: Never Used   • Alcohol use No   • Drug use: No   • Sexual activity: Not on file     Other Topics Concern   • Not on file     Social History Narrative   • No narrative on file     Allergies   Allergen Reactions   • Macrodantin [Nitrofurantoin Macrocrystal] Itching and Swelling   • Amlodipine Swelling   • Augmentin Itching     Headaches   • Z-Pako [Azithromycin] Itching and Nausea     Outpatient Encounter Prescriptions as of 7/11/2018   Medication Sig Dispense Refill   • valsartan-hydrochlorothiazide (DIOVAN-HCT) 320-12.5 MG per tablet Take 1 Tab by mouth every day. 30 Tab 3   • carvedilol (COREG) 12.5 MG Tab Take 1 Tab by mouth 2 times a day, with meals. 180 Tab 3   • potassium chloride (KLOR-CON) 8 MEQ tablet Take 1 Tab by mouth every day. 90 Tab 3   • Cranberry (THERACRAN PO) Take  by mouth.     •  "vitamin D (CHOLECALCIFEROL) 1000 UNIT Tab Take 1,000 Units by mouth every day.     • Probiotic Product (PROBIOTIC DAILY PO) Take 1 Each by mouth every day.     • estradiol (VIVELLE DOT) 0.1 MG/24HR PTTW Apply 1 Patch to skin as directed every 7 days.     • Calcium Carbonate-Vitamin D (CALCIUM + D PO) Take 500 mg by mouth every day.     • Multiple Vitamin (MULTI-VITAMIN DAILY PO) Take  by mouth every day.     • estradiol (ESTRACE) 1 MG TABS Take 1 mg by mouth. Three times a week (M-W-F)     • omeprazole (PRILOSEC) 20 MG CPDR Take 20 mg by mouth every day.       • [DISCONTINUED] valsartan-hydrochlorothiazide (DIOVAN-HCT) 160-25 MG per tablet Take 1 Tab by mouth every day. 90 Tab 3     No facility-administered encounter medications on file as of 7/11/2018.      Review of Systems   Constitutional: Negative.  Negative for malaise/fatigue.   Eyes: Negative for blurred vision.   Respiratory: Negative for shortness of breath.    Cardiovascular: Negative for chest pain, palpitations and leg swelling.   Gastrointestinal: Negative for nausea and vomiting.   Musculoskeletal:        Right lateral thigh pain.   Neurological: Negative.  Negative for weakness.        Objective:   /84   Pulse 78   Ht 1.588 m (5' 2.5\")   Wt 68 kg (150 lb)   SpO2 97%   BMI 27.00 kg/m²     Physical Exam   Constitutional: She is oriented to person, place, and time. No distress.   HENT:   Head: Normocephalic and atraumatic.   Eyes: Pupils are equal, round, and reactive to light. No scleral icterus.   Neck: No JVD present.   Cardiovascular: Normal rate and regular rhythm.    No murmur heard.  Pulmonary/Chest: No respiratory distress. She has no wheezes.   Abdominal: Soft. She exhibits no distension. There is no tenderness.   Musculoskeletal: She exhibits no edema.   Neurological: She is alert and oriented to person, place, and time.   Skin: Skin is warm.   Psychiatric: She has a normal mood and affect.       Assessment:     1. Essential " hypertension, benign  REFERRAL TO PHYSICAL THERAPY Reason for Therapy: Eval/Treat/Report   2. Iliotibial band syndrome of right side  REFERRAL TO PHYSICAL THERAPY Reason for Therapy: Eval/Treat/Report   3. Essential hypertension         Medical Decision Making:  Today's Assessment / Status / Plan:   Hypertension: Difficult to control.  She was changed from Mindy Valera because of insurance reasons.  Her blood pressure is 175 systolic.  Valsartan will be increased to 325 mg/12.5 mg of hydrochlorothiazide.  Keep carvedilol to same dose.  Return for blood pressure check in a couple of months.    Iliotibial pain.  The patient has pain to palpation on her IT band.  It appears that she has iliotibial pain and will be referred to physical therapy.    Sleep apnea: Patient failed her sleep study back in the winter.  The referral to pulmonary never materialize and will we will try to expedite that.  Return for blood pressure check.

## 2018-07-16 ENCOUNTER — TELEPHONE (OUTPATIENT)
Dept: CARDIOLOGY | Facility: MEDICAL CENTER | Age: 78
End: 2018-07-16

## 2018-07-16 NOTE — TELEPHONE ENCOUNTER
----- Message from Fany Nieto R.N. sent at 7/12/2018  2:55 PM PDT -----  Dr. Garrett wanted me to defer this to you. He saw her on 7/11 and wants her back in 2 months. She will only see him. I told him he doesn't have any appts in 2 months and he said to give it to you.    Thanks,  Fany

## 2018-08-23 ENCOUNTER — PHYSICAL THERAPY (OUTPATIENT)
Dept: PHYSICAL THERAPY | Facility: REHABILITATION | Age: 78
End: 2018-08-23
Attending: INTERNAL MEDICINE
Payer: MEDICARE

## 2018-08-23 DIAGNOSIS — M76.31 ILIOTIBIAL BAND SYNDROME OF RIGHT SIDE: ICD-10-CM

## 2018-08-23 PROCEDURE — 97110 THERAPEUTIC EXERCISES: CPT

## 2018-08-23 PROCEDURE — 97162 PT EVAL MOD COMPLEX 30 MIN: CPT

## 2018-08-23 PROCEDURE — G8979 MOBILITY GOAL STATUS: HCPCS | Mod: CI

## 2018-08-23 PROCEDURE — G8978 MOBILITY CURRENT STATUS: HCPCS | Mod: CK

## 2018-08-23 ASSESSMENT — ENCOUNTER SYMPTOMS
PAIN SCALE AT LOWEST: 0
PAIN SCALE AT HIGHEST: 9
PAIN SCALE: 0

## 2018-08-23 NOTE — OP THERAPY EVALUATION
"  Outpatient Physical Therapy  INITIAL EVALUATION    [facility] Physical Therapy [facility]  [address]  [address]  [place] [address]  Phone:  [phone]  Fax:  [phone]    Date of Evaluation: 2018    Patient: Danica Rapp  YOB: 1940  MRN: [id number]     Referring Provider: Christo Garrett M.D.  [address]  [address]  [place] [address]   Referring Diagnosis Essential hypertension, benign [I10];Iliotibial band syndrome of right side [M76.31]     Time Calculation             Physical Therapy Occurrence Codes    Date of onset of impairment:  18   Date physical therapy care plan established or reviewed:  18   Date physical therapy treatment started:  18          Chief Complaint: No chief complaint on file.    Visit Diagnoses     ICD-10-CM   1. Iliotibial band syndrome of right side M76.31         Subjective   History of Present Illness:     History of chief complaint:  R anterior lateral thigh ache over the past 3 months with pain that progresses down leg to \"shin\".  Overall, slightly better but still waking patient at night.  Patient denies VIKY.  Went to [place] for 5 weeks with symptoms not worse at first but over the past month L pain has lessened.  Patient reports that leg also feels weak with walking and stairs.  Increased sedentary lifestyle over the past year.    Prior level of function:  Retired// no specific exercise pgrm but lots of walking with traveling    Pain:     Current pain ratin    At best pain ratin    At worst pain ratin    Location:  Lateral/anterior thigh, R buttock and anterior tibial--ache and burn--denies n/t    Aggravating factors:  Wake up > 3-4/wk  Worse with walking > 15'  Sit longer >10'  More than six steps        Past Medical History:   Diagnosis Date   • Chickenpox    • Essential hypertension, benign 2012   • GERD (gastroesophageal reflux disease)    • Bulgarian measles    • Mumps    • Nasal drainage    • Nonspecific " "abnormal electrocardiogram (ECG) (EKG) 6/5/2012   • Other chest pain 6/5/2012     Past Surgical History:   Procedure Laterality Date   • CHOLECYSTECTOMY     • HYSTERECTOMY LAPAROSCOPY         Precautions:       Objective   Observation and functional movement:  Forward trunk lean with decreased lumbar lordosis    Sensation and reflexes:       Reflexes are normal and symmetrical.    Vonnie: bilateral down going  Clonus: bilateral 0 beats  mmt 4+/5 wnl except 4/5 R EHL (L5)--pain with resisted mmt for hip flexor  History of non-neurological related incontinence--does not struggle with stop/start  Saddle paresthesia: denied      Palpation and joint mobility:     ttp R l5-s2 multifidi  Hypomobile throughout lower l/s            Therapeutic Treatments and Modalities:     Therapeutic Treatment and Modalities Summary: Core stab level I w/ BFB:--manual assist for ant pelvic tilt to enhance multifidi recruitment   --ball in/out   --marching   --BKFO  Instructed 3 curves  Instructed centralization   Tall wall  \"get tall\" with all activity    Time-based treatments/modalities:          Assessment, Response and Plan:   Assessment details:  Patient presents with L/S derangement  syndrome with  poor posture  and poor body awareness with decreased lordosis.   Neurological exam is WNL except noted weakness of R L5 myotome. Patient presents with axial load and flexion sensitivities w/ poor volitional recruitment of lower lumbar multifidi. Patient centralized with Grey extension protocol. Patient should do well if compliant with POC.  Prognosis: good    Goals:   Short Term Goals:   -Patient will be able to sleep through the night..   -Patient will be able to walk more than ten minutes without increased pain.  -Patient will be able to stand more than ten minutes without increased pain.  -Patient will be able to sit more than five minutes without increased pain.  -Patient will be able to descend more than six steps without increased " pain.  -Patient will be able to carry more than ten pounds for more than one minute without        increased pain..  -Patient will be able to transfer in/out of car without increased pain.  WOMAC: <35%  Short term goal time span:  2-4 weeks      Long Term Goals:    -Patient will be able to sleep through the night..   -Patient will be able to walk more than ten minutes without increased pain.  -Patient will be able to stand more than ten minutes without increased pain.  -Patient will be able to sit more than five minutes without increased pain.  -Patient will be able to descend more than six steps without increased pain.  -Patient will be able to carry more than ten pounds for more than one minute without        increased pain..  -Patient will be able to transfer in/out of car without increased pain.  WOMAC< 20%  Long term goal time span:  6-8 weeks    Plan:   Therapy options:  Physical therapy treatment to continue  Planned therapy interventions:  Therapeutic Exercise (CPT 52130), E Stim Unattended (CPT 41646), Manual Therapy (CPT 01628) and Mechanical Traction (CPT 55860)  Frequency:  2x week  Duration in weeks:  8  Duration in visits:  10  Plan details:  Core stab level 1-2, holli ? Traction, angles and flasher      Functional Limitation G-Codes and Severity Modifiers      Current: Mobility: Current: CK   Goal: Mobility: Goal: CI     Referring provider co-signature:  I have reviewed this plan of care and my co-signature certifies the need for services.  Certification Dates:   From 8/23/18  To 10/23/18    Physician Signature: ________________________________ Date: ______________

## 2018-08-28 ENCOUNTER — APPOINTMENT (OUTPATIENT)
Dept: PHYSICAL THERAPY | Facility: REHABILITATION | Age: 78
End: 2018-08-28
Attending: INTERNAL MEDICINE
Payer: MEDICARE

## 2018-08-30 ENCOUNTER — PHYSICAL THERAPY (OUTPATIENT)
Dept: PHYSICAL THERAPY | Facility: REHABILITATION | Age: 78
End: 2018-08-30
Attending: INTERNAL MEDICINE
Payer: MEDICARE

## 2018-08-30 DIAGNOSIS — M76.31 ILIOTIBIAL BAND SYNDROME OF RIGHT SIDE: ICD-10-CM

## 2018-08-30 PROCEDURE — 97116 GAIT TRAINING THERAPY: CPT

## 2018-08-30 PROCEDURE — 97110 THERAPEUTIC EXERCISES: CPT

## 2018-08-30 NOTE — OP THERAPY DAILY TREATMENT
"  Outpatient Physical Therapy  DAILY TREATMENT     [facility] Physical Therapy [address]  [address]  [place] [address]  Phone:  [phone]  Fax:  [phone]    Date: 08/30/2018    Patient: Danica Rapp  YOB: 1940  MRN: [id number]     Time Calculation  Start time: 1117  Stop time: 1145 Time Calculation (min): 28 minutes     Chief Complaint: No chief complaint on file.    Visit #: 2    SUBJECTIVE:  Not doing much exercise past few days...leg feels tight and stiff    OBJECTIVE:            Therapeutic Treatments and Modalities:     Therapeutic Treatment and Modalities Summary: Gait trg w/ focus on t-plane pelvic motion w/ arm swing  Supine bent knee trunk rotation w/ focus on scap stability  S/l running man w/ and w/o #1 x 30--  Tall wall --hep  Post Chain Strengthening:  Ball in/out focus on hamstrings  Ball bridges x 45\" x 2      Time-based treatments/modalities:  Gait training minutes (CPT 26396): 15 minutes  Therapeutic exercise minutes (CPT 72395): 13 minutes       Pain rating before treatment: 0  Pain rating after treatment: 0    ASSESSMENT:   Poor hip dissociation and poor t-plane pelvic control w/ forward trunk posture--poor hep compliance  PLAN/RECOMMENDATIONS:   : increase posterior chain strength, fitter for t-plane motion      "

## 2018-09-04 ENCOUNTER — PHYSICAL THERAPY (OUTPATIENT)
Dept: PHYSICAL THERAPY | Facility: REHABILITATION | Age: 78
End: 2018-09-04
Attending: INTERNAL MEDICINE
Payer: MEDICARE

## 2018-09-04 DIAGNOSIS — M76.31 ILIOTIBIAL BAND SYNDROME OF RIGHT SIDE: ICD-10-CM

## 2018-09-04 PROCEDURE — 97110 THERAPEUTIC EXERCISES: CPT

## 2018-09-04 NOTE — OP THERAPY DAILY TREATMENT
"  Outpatient Physical Therapy  DAILY TREATMENT     [facility] Physical [address]  [address]  [place] [address]  Phone:  [phone]  Fax:  [phone]    Date: 09/04/2018    Patient: Danica Rapp  YOB: 1940  MRN: [id number]     Time Calculation  Start time: 1119  Stop time: 1147 Time Calculation (min): 28 minutes     Chief Complaint: No chief complaint on file.    Visit #: 3    SUBJECTIVE:  Less   Frequent tightness with occasional weakness in the leg  OBJECTIVE:  Slight increased thigh pain with end range IR but otherwise R hip ROM  WNL for flexion/ER          Therapeutic Treatments and Modalities:     Therapeutic Treatment and Modalities Summary: Gait trg w/ focus on t-plane pelvic motion w/ arm swing--reviewed  Supine bent knee trunk rotation w/ focus on scap stability  S/l running man w/ and w/ #0 x 30  Post Chain Strengthening:  Ball in/out focus on hamstrings  Ball bridges x 45\" x 2      Time-based treatments/modalities:  Gait training minutes (CPT 52714): 3 minutes  Therapeutic exercise minutes (CPT 64574): 23 minutes       Pain rating before treatment: 0  Pain rating after treatment: 0    ASSESSMENT:   Improving gait sequencing and strength with less frequent pain complaints  PLAN/RECOMMENDATIONS:   : increase posterior chain strength, fitter for t-plane motion, quadruped w/ alternating leg, tall kneelad      "

## 2018-09-06 ENCOUNTER — PHYSICAL THERAPY (OUTPATIENT)
Dept: PHYSICAL THERAPY | Facility: REHABILITATION | Age: 78
End: 2018-09-06
Attending: INTERNAL MEDICINE
Payer: MEDICARE

## 2018-09-06 DIAGNOSIS — M76.31 ILIOTIBIAL BAND SYNDROME OF RIGHT SIDE: ICD-10-CM

## 2018-09-06 DIAGNOSIS — M53.86 LOW BACK DERANGEMENT SYNDROME: ICD-10-CM

## 2018-09-06 PROCEDURE — 97110 THERAPEUTIC EXERCISES: CPT

## 2018-09-06 NOTE — OP THERAPY DAILY TREATMENT
"      Outpatient Physical Therapy  DAILY TREATMENT     [facility] Physical Therapy [address]  [address]  [place] [address]  Phone:  [phone]  Fax:  [phone]    Date: 09/06/2018    Patient: Danica Rapp  YOB: 1940  MRN: [id number]     Time Calculation  Start time: 1100  Stop time: 1120 Time Calculation (min): 20 minutes     Chief Complaint: No chief complaint on file.    Visit #: 4    SUBJECTIVE:  Less leg pain and feels stronger w/ one bout of R leg giving-way . Able to walk 30' but slowly    OBJECTIVE:            Therapeutic Treatments and Modalities:     Therapeutic Treatment and Modalities Summary:   Ball in/out focus on hamstrings  Ball bridges x 45\"  Ball bridges with alternating leg lifts  Ball bridge hamstring curls  Bridge marching     Time-based treatments/modalities:  Therapeutic exercise minutes (CPT 33895): 19 minutes       Pain rating before treatment: 0  Pain rating after treatment: 0    ASSESSMENT:   Fair- post chain strength and cont. Forward trunk lean    PLAN/RECOMMENDATIONS:   Hold 2wk-increase core stab    "

## 2018-09-11 ENCOUNTER — APPOINTMENT (OUTPATIENT)
Dept: PHYSICAL THERAPY | Facility: REHABILITATION | Age: 78
End: 2018-09-11
Attending: INTERNAL MEDICINE
Payer: MEDICARE

## 2018-09-13 ENCOUNTER — APPOINTMENT (OUTPATIENT)
Dept: PHYSICAL THERAPY | Facility: REHABILITATION | Age: 78
End: 2018-09-13
Attending: INTERNAL MEDICINE
Payer: MEDICARE

## 2018-09-18 ENCOUNTER — APPOINTMENT (OUTPATIENT)
Dept: PHYSICAL THERAPY | Facility: REHABILITATION | Age: 78
End: 2018-09-18
Attending: INTERNAL MEDICINE
Payer: MEDICARE

## 2018-09-20 ENCOUNTER — APPOINTMENT (OUTPATIENT)
Dept: PHYSICAL THERAPY | Facility: REHABILITATION | Age: 78
End: 2018-09-20
Attending: INTERNAL MEDICINE
Payer: MEDICARE

## 2018-09-21 ENCOUNTER — TELEPHONE (OUTPATIENT)
Dept: CARDIOLOGY | Facility: MEDICAL CENTER | Age: 78
End: 2018-09-21

## 2018-09-21 NOTE — TELEPHONE ENCOUNTER
----- Message from Anjana Marcos sent at 9/21/2018 10:56 AM PDT -----  Regarding: wants to discuss missed appt  RS/Adrienne      Patient said she had to cancel her 09/20 appt at the last minute because she became very ill. She believes it was caused by her high blood pressure. She would like to discuss with you what happened. She can be reached at [phone].

## 2018-09-21 NOTE — TELEPHONE ENCOUNTER
Pt. Canceled FV yesterday with Dr. Garrett because of sudden onset of diarrhea. She had multiple loose stools, felt weak, clammy, dizzy, with abdominal cramping. Sx. Resolved by evening. She has had 2 formed soft stools today, tolerating bland diet well.   She had abdominal bloating the day before so took a dose of Lasix 40mg left over from her . (She also takes Valsartan -12.5mg QD and Potassium 8mEq QD. She is wondering if that could have precipitated diarrhea.  Scheduled to see Dr. Garrett 9-28-18.

## 2018-09-28 ENCOUNTER — OFFICE VISIT (OUTPATIENT)
Dept: CARDIOLOGY | Facility: MEDICAL CENTER | Age: 78
End: 2018-09-28
Payer: MEDICARE

## 2018-09-28 VITALS
WEIGHT: 147 LBS | HEIGHT: 63 IN | OXYGEN SATURATION: 95 % | DIASTOLIC BLOOD PRESSURE: 84 MMHG | SYSTOLIC BLOOD PRESSURE: 152 MMHG | BODY MASS INDEX: 26.05 KG/M2 | HEART RATE: 78 BPM

## 2018-09-28 DIAGNOSIS — I10 ESSENTIAL HYPERTENSION, BENIGN: ICD-10-CM

## 2018-09-28 PROCEDURE — 99213 OFFICE O/P EST LOW 20 MIN: CPT | Performed by: INTERNAL MEDICINE

## 2018-09-28 RX ORDER — VALSARTAN AND HYDROCHLOROTHIAZIDE 320; 25 MG/1; MG/1
1 TABLET, FILM COATED ORAL DAILY
Qty: 30 TAB | Refills: 6 | Status: SHIPPED | OUTPATIENT
Start: 2018-09-28 | End: 2018-09-28 | Stop reason: SDUPTHER

## 2018-09-28 ASSESSMENT — ENCOUNTER SYMPTOMS
SHORTNESS OF BREATH: 0
NEUROLOGICAL NEGATIVE: 1
DIARRHEA: 1
WEAKNESS: 0
VOMITING: 0
NAUSEA: 0
BLURRED VISION: 0
CONSTITUTIONAL NEGATIVE: 1
PALPITATIONS: 0

## 2018-09-28 NOTE — PROGRESS NOTES
Chief Complaint   Patient presents with   • HTN (Controlled)       Subjective:   Danica Rapp is a 77 y.o. female who presents today for blood pressure check.  Last visit, her blood pressure was not under good control and valsartan was increased to valsartan/HCTZ 320/12.5 mg.  She was placed in the lower dose of hydrochlorothiazide because of problems of hypokalemia in the past.  Blood pressure at home however been high in occasion as high as 170.    Interval history notable for the fact that she had a day of explosive diarrhea and has had some loose bowels after that episode.     Past Medical History:   Diagnosis Date   • Essential hypertension, benign 6/5/2012   • Nonspecific abnormal electrocardiogram (ECG) (EKG) 6/5/2012   • Other chest pain 6/5/2012     History reviewed. No pertinent surgical history.  History reviewed. No pertinent family history.  Social History     Social History   • Marital status:      Spouse name: N/A   • Number of children: N/A   • Years of education: N/A     Occupational History   • Not on file.     Social History Main Topics   • Smoking status: Never Smoker   • Smokeless tobacco: Never Used   • Alcohol use No   • Drug use: No   • Sexual activity: Not on file     Other Topics Concern   • Not on file     Social History Narrative   • No narrative on file     Allergies   Allergen Reactions   • Macrodantin [Nitrofurantoin Macrocrystal] Itching and Swelling   • Amlodipine Swelling   • Augmentin Itching     Headaches   • Z-Pako [Azithromycin] Itching and Nausea     Outpatient Encounter Prescriptions as of 9/28/2018   Medication Sig Dispense Refill   • valsartan-hydrochlorothiazide (DIOVAN-HCT) 320-25 MG per tablet Take 1 Tab by mouth every day. 30 Tab 6   • carvedilol (COREG) 12.5 MG Tab Take 1 Tab by mouth 2 times a day, with meals. 180 Tab 3   • potassium chloride (KLOR-CON) 8 MEQ tablet Take 1 Tab by mouth every day. 90 Tab 3   • Cranberry (THERACRAN PO) Take  by mouth.     • vitamin  "D (CHOLECALCIFEROL) 1000 UNIT Tab Take 1,000 Units by mouth every day.     • Probiotic Product (PROBIOTIC DAILY PO) Take 1 Each by mouth every day.     • estradiol (VIVELLE DOT) 0.1 MG/24HR PTTW Apply 1 Patch to skin as directed every 7 days.     • Calcium Carbonate-Vitamin D (CALCIUM + D PO) Take 500 mg by mouth every day.     • Multiple Vitamin (MULTI-VITAMIN DAILY PO) Take  by mouth every day.     • estradiol (ESTRACE) 1 MG TABS Take 1 mg by mouth every day.     • omeprazole (PRILOSEC) 20 MG CPDR Take 20 mg by mouth every day.       • [DISCONTINUED] valsartan-hydrochlorothiazide (DIOVAN-HCT) 320-12.5 MG per tablet Take 1 Tab by mouth every day. 30 Tab 3     No facility-administered encounter medications on file as of 9/28/2018.      Review of Systems   Constitutional: Negative.  Negative for malaise/fatigue.   Eyes: Negative for blurred vision.   Respiratory: Negative for shortness of breath.    Cardiovascular: Negative for chest pain, palpitations and leg swelling.   Gastrointestinal: Positive for diarrhea. Negative for nausea and vomiting.   Neurological: Negative.  Negative for weakness.        Objective:   /84 (BP Location: Left arm, Patient Position: Sitting, BP Cuff Size: Adult)   Pulse 78   Ht 1.588 m (5' 2.5\")   Wt 66.7 kg (147 lb)   SpO2 95%   BMI 26.46 kg/m²     Physical Exam   Constitutional: She is oriented to person, place, and time. No distress.   Physical exam is unchanged from last visit.   HENT:   Head: Normocephalic and atraumatic.   Eyes: Pupils are equal, round, and reactive to light. No scleral icterus.   Neck: No JVD present.   Cardiovascular: Normal rate and regular rhythm.    No murmur heard.  Pulmonary/Chest: No respiratory distress. She has no wheezes.   Abdominal: Soft. She exhibits no distension. There is no tenderness.   Musculoskeletal: She exhibits no edema.   Neurological: She is alert and oriented to person, place, and time.   Skin: Skin is warm.   Psychiatric: She has " a normal mood and affect.       Assessment:     1. Essential hypertension, benign  BASIC METABOLIC PANEL       Medical Decision Making:  Today's Assessment / Status / Plan:    Hypertension: Blood pressure still under control and is 158/72 right arm sitting by my reading.  She responded well to hydrochlorothiazide in the past, but has troubles with hypokalemia.  Will increase valsartan/HCTZ to 325/25.  Return in 1 month and obtain acute basic metabolic panel just before the next visit.  She will call me interim should her pressures not be controlled.  Continue current dose of carvedilol.

## 2018-09-29 RX ORDER — VALSARTAN AND HYDROCHLOROTHIAZIDE 320; 25 MG/1; MG/1
TABLET, FILM COATED ORAL
Qty: 90 TAB | Refills: 3 | Status: SHIPPED | OUTPATIENT
Start: 2018-09-29 | End: 2018-12-13

## 2018-10-22 ENCOUNTER — APPOINTMENT (RX ONLY)
Dept: URBAN - METROPOLITAN AREA CLINIC 4 | Facility: CLINIC | Age: 78
Setting detail: DERMATOLOGY
End: 2018-10-22

## 2018-10-22 DIAGNOSIS — D22 MELANOCYTIC NEVI: ICD-10-CM

## 2018-10-22 DIAGNOSIS — L82.1 OTHER SEBORRHEIC KERATOSIS: ICD-10-CM

## 2018-10-22 DIAGNOSIS — L57.0 ACTINIC KERATOSIS: ICD-10-CM

## 2018-10-22 DIAGNOSIS — L81.4 OTHER MELANIN HYPERPIGMENTATION: ICD-10-CM

## 2018-10-22 PROBLEM — D22.5 MELANOCYTIC NEVI OF TRUNK: Status: ACTIVE | Noted: 2018-10-22

## 2018-10-22 PROCEDURE — ? LIQUID NITROGEN

## 2018-10-22 PROCEDURE — 17003 DESTRUCT PREMALG LES 2-14: CPT

## 2018-10-22 PROCEDURE — ? COUNSELING

## 2018-10-22 PROCEDURE — 99213 OFFICE O/P EST LOW 20 MIN: CPT | Mod: 25

## 2018-10-22 PROCEDURE — ? DIAGNOSIS COMMENT

## 2018-10-22 PROCEDURE — 17000 DESTRUCT PREMALG LESION: CPT

## 2018-10-22 PROCEDURE — ? OBSERVATION AND MEASURE

## 2018-10-22 ASSESSMENT — LOCATION ZONE DERM
LOCATION ZONE: HAND
LOCATION ZONE: LEG
LOCATION ZONE: FACE
LOCATION ZONE: TRUNK

## 2018-10-22 ASSESSMENT — LOCATION SIMPLE DESCRIPTION DERM
LOCATION SIMPLE: RIGHT HAND
LOCATION SIMPLE: LEFT FOREHEAD
LOCATION SIMPLE: LEFT CHEEK
LOCATION SIMPLE: UPPER BACK
LOCATION SIMPLE: RIGHT PRETIBIAL REGION

## 2018-10-22 ASSESSMENT — LOCATION DETAILED DESCRIPTION DERM
LOCATION DETAILED: RIGHT RADIAL DORSAL HAND
LOCATION DETAILED: LEFT INFERIOR FOREHEAD
LOCATION DETAILED: RIGHT PROXIMAL PRETIBIAL REGION
LOCATION DETAILED: LEFT LATERAL MALAR CHEEK
LOCATION DETAILED: INFERIOR THORACIC SPINE

## 2018-10-22 NOTE — PROCEDURE: LIQUID NITROGEN
Consent: The patient's consent was obtained including but not limited to risks of crusting, scabbing, blistering, scarring, darker or lighter pigmentary change, recurrence, incomplete removal and infection.
Duration Of Freeze Thaw-Cycle (Seconds): 2
Render Post-Care Instructions In Note?: no
Detail Level: Detailed
Post-Care Instructions: I reviewed with the patient in detail post-care instructions. Patient is to wear sunprotection, and avoid picking at any of the treated lesions. Pt may apply Vaseline to crusted or scabbing areas.
Number Of Freeze-Thaw Cycles: 1 freeze-thaw cycle

## 2018-10-22 NOTE — PROCEDURE: COUNSELING
Detail Level: Detailed
Detail Level: Zone
Patient Specific Counseling (Will Not Stick From Patient To Patient): Patient referred to Olga Lidia to discuss possible laser referral.
Detail Level: Generalized

## 2018-10-29 PROBLEM — Z78.9 NON-SMOKER: Status: ACTIVE | Noted: 2018-10-29

## 2018-10-29 PROBLEM — G47.33 OBSTRUCTIVE SLEEP APNEA-HYPOPNEA SYNDROME: Status: ACTIVE | Noted: 2018-10-29

## 2018-10-29 PROBLEM — G47.34 NOCTURNAL HYPOXEMIA: Status: ACTIVE | Noted: 2018-10-29

## 2018-10-29 NOTE — PROGRESS NOTES
"CC: ***    HPI:    Ms. Danica Rapp  is a 76y/o female kindly referred by Dr. Christo Garrett MD for evaluation and management of possible sleep disordered breathing.  She had an overnight oximetry performed on 2/13/2018 which showed saturations less than or equal to 88% for 82.8 minutes and less than or equal to 89% for 159.6 minutes with a zachary saturation of 71%.  The oximetric pattern was both clustered and sawtoothed consistent with obstructive sleep apnea syndrome.    The patient briefly describes the presenting problem as  \"***\". The patient grades the severity as ***.  Symptom duration has been ***.    The patient's symptoms include snoring, loud and disturbing snoring, witnessed apneas, resuscitative snorts, nocturnal shortness of breath, non-restorative sleep, nocturnal awakenings, nocturia, excessive daytime sleepiness, fatigue, tiredness, falling asleep accidentally, napping or returning to bed after arising, restless or nervous legs, limb movements during sleep, and nocturnal or early morning headaches.     Modifying factors and significant comorbidities include ***.    The patient's sleep diary reveals ***.    The roommate/spouse has noted *** . These behaviors occur *** and have been present for ***. Modifying factors include *** .      Symptom Summary:  Snoring: +  Very loud snoring: +  Witnessed apneas: +  Resuscitative snorts: +  Nocturnal shortness of breath: +  Non-restorative sleep: +  Insomnia: +  Nocturnal awakenings: +  Nocturia: +  EDS: +  Fatigue: +  Tiredness: +  Falls asleep accidentally: +   Napping or returning to bed after arising: +  Restless legs: -  Limb movements during sleep: +  Nocturnal headaches: +    The patient's sleep questionnaire, sleep diary, ESS, and spousal report were all personally reviewed.    Significant comorbidities and modifying factors include ***.    Location, quality, severity, duration, timing, context, modifying factors, and associated " signs/symptoms.      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    No family history on file.    Social History     Social History   • Marital status:      Spouse name: N/A   • Number of children: N/A   • Years of education: N/A     Occupational History   • Not on file.     Social History Main Topics   • Smoking status: Never Smoker   • Smokeless tobacco: Never Used   • Alcohol use No   • Drug use: No   • Sexual activity: Not on file     Other Topics Concern   • Not on file     Social History Narrative   • No narrative on file       Current Outpatient Prescriptions   Medication Sig Dispense Refill   • valsartan-hydrochlorothiazide (DIOVAN-HCT) 320-25 MG per tablet TAKE 1 TABLET BY MOUTH EVERY DAY 90 Tab 3   • carvedilol (COREG) 12.5 MG Tab Take 1 Tab by mouth 2 times a day, with meals. 180 Tab 3   • potassium chloride (KLOR-CON) 8 MEQ tablet Take 1 Tab by mouth every day. 90 Tab 3   • Cranberry (THERACRAN PO) Take  by mouth.     • vitamin D (CHOLECALCIFEROL) 1000 UNIT Tab Take 1,000 Units by mouth every day.     • Probiotic Product (PROBIOTIC DAILY PO) Take 1 Each by mouth every day.     • estradiol (VIVELLE DOT) 0.1 MG/24HR PTTW Apply 1 Patch to skin as directed every 7 days.     • Calcium Carbonate-Vitamin D (CALCIUM + D PO) Take 500 mg by mouth every day.     • Multiple Vitamin (MULTI-VITAMIN DAILY PO) Take  by mouth every day.     • estradiol (ESTRACE) 1 MG TABS Take 1 mg by  "mouth every day.     • omeprazole (PRILOSEC) 20 MG CPDR Take 20 mg by mouth every day.         No current facility-administered medications for this visit.     \"CURRENT RX\"    ALLERGIES: Macrodantin [nitrofurantoin macrocrystal]; Amlodipine; Augmentin; and Z-penelope [azithromycin]    ROS  ***  Constitutional: Denies fever, chills, sweats,  weight loss, fatigue.  Eyes: Denies vision loss, pain, drainage, double vision, glasses.  Ears/Nose/Mouth/Throat: Denies earache, difficulty hearing, rhinitis/nasal congestion, injury, recurrent sore throat, persistent hoarseness, decayed teeth/toothaches, ringing or buzzing in the ears.  Cardiovascular: Denies chest pain, tightness, palpitations, swelling in legs/feet, fainting, difficulty breathing when lying down but gets better when sitting up.   Respiratory: Denies shortness of breath, cough, sputum, wheezing, painful breathing, coughing up blood.   Sleep: per HPI  Gastrointestinal: Denies  difficulty swallowing, nausea, abdominal pain, diarrhea, constipation, heartburn.  Genitourinary: Denies  blood in urine, discharge, frequent urination.   Musculoskeletal: Denies painful joints, sore muscles, back pain.   Integumentary: Denies rashes, lumps, color changes.   Neurological: Denies frequent headaches,weakness, dizziness.    PHYSICAL EXAM  ***    There were no vitals taken for this visit.  Appearance: Well-nourished, well-developed, no acute distress  Eyes:  PERRLA, EOMI  ENMT: without lesions, deformities;hearing grossly intact; tongue normal, posterior pharynx without erythema or exudate; Mallampati classification:   Neck: Supple, trachea midline, no masses  Respiratory effort:  No intercostal retractions or use of accessory muscles  Lung auscultation:  No wheezes rhonchi rubs or rales  Cardiac: No murmurs, rubs, or gallops; regular rhythm, normal rate; no edema  Abdomen:  No tenderness, no organomegaly  Musculoskeletal:  Grossly normal; gait and station normal; digits and nails " normal  Skin:  No rashes, petechiae, cyanosis  Neurologic: without focal signs; oriented to person, time, place, and purpose; judgement intact  Psychiatric:  No depression, anxiety, agitation        PROBLEMS:  ***  There are no diagnoses linked to this encounter.    PLAN:   The patient has signs and symptoms consistent with obstructive sleep apnea hypopnea syndrome. Will schedule to have a nocturnal polysomnogram using zolpidem to assist with sleep onset and maintenance should the need arise. Will return after the results are available to determine further diagnostic needs and/or treatment options.    The risks of untreated sleep apnea were discussed with the patient at length. Patients with KAMRYN are at increased risk of cardiovascular disease including coronary artery disease, systemic arterial hypertension, pulmonary arterial hypertension, cardiac arrythmias, and stroke. KAMRYN patients have an increased risk of motor vehicle accidents, type 2 diabetes, chronic kidney disease, and non-alcoholic liver disease. The patient was advised to avoid driving a motor vehicle when drowsy.    Positive airway pressure, such as CPAP, is considered first-line and preferred therapy for sleep apnea and may reverse both symptoms and risks.             ***  No Follow-up on file.

## 2018-10-30 ENCOUNTER — APPOINTMENT (OUTPATIENT)
Dept: SLEEP MEDICINE | Facility: MEDICAL CENTER | Age: 78
End: 2018-10-30
Payer: MEDICARE

## 2018-10-30 ENCOUNTER — HOSPITAL ENCOUNTER (OUTPATIENT)
Dept: LAB | Facility: MEDICAL CENTER | Age: 78
End: 2018-10-30
Attending: INTERNAL MEDICINE
Payer: MEDICARE

## 2018-10-30 ENCOUNTER — HOSPITAL ENCOUNTER (OUTPATIENT)
Dept: LAB | Facility: MEDICAL CENTER | Age: 78
End: 2018-10-30
Attending: OPHTHALMOLOGY
Payer: MEDICARE

## 2018-10-30 LAB
ALBUMIN SERPL BCP-MCNC: 3.5 G/DL (ref 3.2–4.9)
ALBUMIN/GLOB SERPL: 1.1 G/DL
ALP SERPL-CCNC: 57 U/L (ref 30–99)
ALT SERPL-CCNC: 24 U/L (ref 2–50)
ANION GAP SERPL CALC-SCNC: 9 MMOL/L (ref 0–11.9)
AST SERPL-CCNC: 28 U/L (ref 12–45)
BILIRUB SERPL-MCNC: 0.4 MG/DL (ref 0.1–1.5)
BUN SERPL-MCNC: 11 MG/DL (ref 8–22)
CALCIUM SERPL-MCNC: 9.2 MG/DL (ref 8.5–10.5)
CHLORIDE SERPL-SCNC: 91 MMOL/L (ref 96–112)
CHOLEST SERPL-MCNC: 200 MG/DL (ref 100–199)
CO2 SERPL-SCNC: 30 MMOL/L (ref 20–33)
CREAT SERPL-MCNC: 0.54 MG/DL (ref 0.5–1.4)
FASTING STATUS PATIENT QL REPORTED: NORMAL
GLOBULIN SER CALC-MCNC: 3.1 G/DL (ref 1.9–3.5)
GLUCOSE SERPL-MCNC: 75 MG/DL (ref 65–99)
HDLC SERPL-MCNC: 69 MG/DL
LDLC SERPL CALC-MCNC: 111 MG/DL
POTASSIUM SERPL-SCNC: 3.6 MMOL/L (ref 3.6–5.5)
PROT SERPL-MCNC: 6.6 G/DL (ref 6–8.2)
SODIUM SERPL-SCNC: 130 MMOL/L (ref 135–145)
T4 SERPL-MCNC: 9.5 UG/DL (ref 4–12)
TRIGL SERPL-MCNC: 101 MG/DL (ref 0–149)
TSH SERPL DL<=0.005 MIU/L-ACNC: 0.76 UIU/ML (ref 0.38–5.33)

## 2018-10-30 PROCEDURE — 84443 ASSAY THYROID STIM HORMONE: CPT

## 2018-10-30 PROCEDURE — 84445 ASSAY OF TSI GLOBULIN: CPT

## 2018-10-30 PROCEDURE — 36415 COLL VENOUS BLD VENIPUNCTURE: CPT

## 2018-10-30 PROCEDURE — 86800 THYROGLOBULIN ANTIBODY: CPT

## 2018-10-30 PROCEDURE — 80053 COMPREHEN METABOLIC PANEL: CPT

## 2018-10-30 PROCEDURE — 84436 ASSAY OF TOTAL THYROXINE: CPT

## 2018-10-30 PROCEDURE — 80061 LIPID PANEL: CPT

## 2018-10-30 PROCEDURE — 83520 IMMUNOASSAY QUANT NOS NONAB: CPT

## 2018-11-01 ENCOUNTER — HOSPITAL ENCOUNTER (OUTPATIENT)
Dept: LAB | Facility: MEDICAL CENTER | Age: 78
End: 2018-11-01
Attending: INTERNAL MEDICINE
Payer: MEDICARE

## 2018-11-01 ENCOUNTER — SLEEP CENTER VISIT (OUTPATIENT)
Dept: SLEEP MEDICINE | Facility: MEDICAL CENTER | Age: 78
End: 2018-11-01
Payer: MEDICARE

## 2018-11-01 VITALS
BODY MASS INDEX: 26.58 KG/M2 | OXYGEN SATURATION: 93 % | RESPIRATION RATE: 16 BRPM | WEIGHT: 150 LBS | HEART RATE: 87 BPM | HEIGHT: 63 IN | SYSTOLIC BLOOD PRESSURE: 160 MMHG | DIASTOLIC BLOOD PRESSURE: 80 MMHG

## 2018-11-01 DIAGNOSIS — Z78.9 NON-SMOKER: ICD-10-CM

## 2018-11-01 DIAGNOSIS — G47.33 OBSTRUCTIVE SLEEP APNEA-HYPOPNEA SYNDROME: ICD-10-CM

## 2018-11-01 DIAGNOSIS — G47.34 NOCTURNAL HYPOXEMIA: ICD-10-CM

## 2018-11-01 DIAGNOSIS — I10 ESSENTIAL HYPERTENSION, BENIGN: ICD-10-CM

## 2018-11-01 DIAGNOSIS — Z92.29 UP TO DATE WITH INFLUENZA VACCINATION: ICD-10-CM

## 2018-11-01 DIAGNOSIS — E66.3 OVERWEIGHT (BMI 25.0-29.9): ICD-10-CM

## 2018-11-01 LAB
BASOPHILS # BLD AUTO: 0.6 % (ref 0–1.8)
BASOPHILS # BLD: 0.05 K/UL (ref 0–0.12)
EOSINOPHIL # BLD AUTO: 0.07 K/UL (ref 0–0.51)
EOSINOPHIL NFR BLD: 0.8 % (ref 0–6.9)
ERYTHROCYTE [DISTWIDTH] IN BLOOD BY AUTOMATED COUNT: 47.8 FL (ref 35.9–50)
HCT VFR BLD AUTO: 37.1 % (ref 37–47)
HGB BLD-MCNC: 12.4 G/DL (ref 12–16)
IMM GRANULOCYTES # BLD AUTO: 0.03 K/UL (ref 0–0.11)
IMM GRANULOCYTES NFR BLD AUTO: 0.4 % (ref 0–0.9)
LYMPHOCYTES # BLD AUTO: 1.78 K/UL (ref 1–4.8)
LYMPHOCYTES NFR BLD: 21 % (ref 22–41)
MCH RBC QN AUTO: 30 PG (ref 27–33)
MCHC RBC AUTO-ENTMCNC: 33.4 G/DL (ref 33.6–35)
MCV RBC AUTO: 89.6 FL (ref 81.4–97.8)
MONOCYTES # BLD AUTO: 0.44 K/UL (ref 0–0.85)
MONOCYTES NFR BLD AUTO: 5.2 % (ref 0–13.4)
NEUTROPHILS # BLD AUTO: 6.09 K/UL (ref 2–7.15)
NEUTROPHILS NFR BLD: 72 % (ref 44–72)
NRBC # BLD AUTO: 0 K/UL
NRBC BLD-RTO: 0 /100 WBC
PLATELET # BLD AUTO: 463 K/UL (ref 164–446)
PMV BLD AUTO: 10.2 FL (ref 9–12.9)
RBC # BLD AUTO: 4.14 M/UL (ref 4.2–5.4)
THYROGLOB AB SERPL-ACNC: <0.9 IU/ML (ref 0–4)
WBC # BLD AUTO: 8.5 K/UL (ref 4.8–10.8)

## 2018-11-01 PROCEDURE — 85025 COMPLETE CBC W/AUTO DIFF WBC: CPT

## 2018-11-01 PROCEDURE — 99204 OFFICE O/P NEW MOD 45 MIN: CPT | Performed by: INTERNAL MEDICINE

## 2018-11-01 RX ORDER — ZOLPIDEM TARTRATE 5 MG/1
5 TABLET ORAL NIGHTLY PRN
Qty: 3 TAB | Refills: 0 | Status: SHIPPED | OUTPATIENT
Start: 2018-11-01 | End: 2018-12-02

## 2018-11-01 NOTE — PROGRESS NOTES
"CC: \"Snoring/oxygen overnight test\"    HPI:    Ms. Danica Rapp  is a 76 y/o F kindly referred by Dr. Christo Garrett MD for evaluation and management of possible sleep disordered breathing.  The patient's overnight oximetry performed in 2018 showed that saturations were less than or equal to 89% for 159.6 minutes, were less than or equal to 88% for 82.8 minutes, and the low saturation was 71%.  The oximetric pattern was both sawtoothed and clustered consistent with obstructive sleep apnea.    The patient briefly describes her sleep problem as \"snoring/oxygen overnight test indicated problem\".  She is unsure about when her sleep problems began but she has been aware for the last \"couple years\".  This affects her life and daily activities by rendering her tired and sleepy.  She considers this mainly a problem for her travel partners.  She may utilize Benadryl to assist with sleep onset and maintenance.  She generally goes to bed 11 PM to midnight and wakes up between 7 AM and 9 AM.  She does not have a regular bed partner.  Her   last year at [facility] after an initial hospitalization at [facility]. Her sleep routine is a bit irregular since her  passed. Doesn't use an alarm. Drinks both caffeinated and uncaffeinated coffee. May nap after a big lunch.    She estimates that her sleep latency is about 10-15 minutes.  She may watch TV just prior to turning out the lights and attempting to go to sleep.  She experiences 1-2 nocturnal awakenings and uses the restroom 1-2 times each night.  Her symptoms include sometimes sleepiness during the day and too little sleep at night as well as tiredness during the day.  She is known to be a loud and disturbing snorer.  She suffers from nocturnal cramping in her legs approximately 1-2 times per week lasting about 5 minutes.  Her symptoms are relieved by getting up.  She denies leg or arm jerking or twitching during sleep.  He does suffer from " "teeth grinding and wears a .  She may experience sleep talking.    She may fall asleep accidentally when watching TV, reading a book, or as a passenger in a motor vehicle.  Review of her sleep diary shows no napping but she awakened feeling \"tired\" 2 of 7 days and \"good\" 5 of 7 days.  According to family and travel partners she has light snoring and sleep talking.  Her total Colton score is just 7.            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   • Hypokalemia 07/15/2014   • Nonspecific abnormal electrocardiogram (ECG) (EKG) 06/05/2012   • Other chest pain 06/05/2012   • Essential hypertension, benign 06/05/2012       Past Medical History:   Diagnosis Date   • Chickenpox    • Essential hypertension, benign 6/5/2012   • GERD (gastroesophageal reflux disease)    • [place] measles    • Mumps    • Nasal drainage    • Nonspecific abnormal electrocardiogram (ECG) (EKG) 6/5/2012   • Other chest pain 6/5/2012        Past Surgical History:   Procedure Laterality Date   • CHOLECYSTECTOMY     • HYSTERECTOMY LAPAROSCOPY         Family History   Problem Relation Age of Onset   • Lung Disease Mother    • Lung Disease Father    • Cancer Father        Social History     Social History   • Marital status:      Spouse name: N/A   • Number of children: N/A   • Years of education: N/A     Occupational History   • Not on file.     Social History Main Topics   • Smoking status: Never Smoker   • Smokeless tobacco: Never Used   • Alcohol use 1.2 - 3.0 oz/week     2 - 4 Glasses of wine per week   • Drug use: No   • Sexual activity: Not on file     Other Topics Concern   • Not on file     Social History Narrative   • No narrative on file       Current " "Outpatient Prescriptions   Medication Sig Dispense Refill   • valsartan-hydrochlorothiazide (DIOVAN-HCT) 320-25 MG per tablet TAKE 1 TABLET BY MOUTH EVERY DAY 90 Tab 3   • carvedilol (COREG) 12.5 MG Tab Take 1 Tab by mouth 2 times a day, with meals. 180 Tab 3   • potassium chloride (KLOR-CON) 8 MEQ tablet Take 1 Tab by mouth every day. 90 Tab 3   • Cranberry (THERACRAN PO) Take  by mouth.     • vitamin D (CHOLECALCIFEROL) 1000 UNIT Tab Take 1,000 Units by mouth every day.     • Probiotic Product (PROBIOTIC DAILY PO) Take 1 Each by mouth every day.     • estradiol (VIVELLE DOT) 0.1 MG/24HR PTTW Apply 1 Patch to skin as directed every 7 days.     • Calcium Carbonate-Vitamin D (CALCIUM + D PO) Take 500 mg by mouth every day.     • Multiple Vitamin (MULTI-VITAMIN DAILY PO) Take  by mouth every day.     • estradiol (ESTRACE) 1 MG TABS Take 1 mg by mouth every day.     • omeprazole (PRILOSEC) 20 MG CPDR Take 20 mg by mouth every day.         No current facility-administered medications for this visit.     \"CURRENT RX\"    ALLERGIES: Macrodantin [nitrofurantoin macrocrystal]; Amlodipine; Augmentin; and Z-penelope [azithromycin]    ROS  Constitutional: Denies fever, chills, sweats,  weight loss, fatigue.  Eyes: Denies vision loss, pain, drainage, double vision, glasses.  Ears/Nose/Mouth/Throat: Denies earache, difficulty hearing, rhinitis/nasal congestion, injury, recurrent sore throat, persistent hoarseness, decayed teeth/toothaches, ringing or buzzing in the ears.  Cardiovascular: Positive for swelling in feet or legs when traveling but otherwise negative   Respiratory: Denies shortness of breath, cough, sputum, wheezing, painful breathing, coughing up blood.   Sleep: per HPI  Gastrointestinal: Denies  difficulty swallowing, nausea, abdominal pain, diarrhea, constipation, positive for heartburn.  Genitourinary: Usually denies symptoms but has had frequent urination and painful urination with urinary tract infections.  Last one " "was October 19, 2018 which resolved with Cipro.    Musculoskeletal: Denies painful joints, sore muscles, back pain.   Integumentary: Denies rashes, lumps, color changes.   Neurological: Denies frequent headaches,weakness, dizziness.    PHYSICAL EXAM  Overweight but not obese    /80 (BP Location: Left arm, Patient Position: Sitting, BP Cuff Size: Adult)   Pulse 87   Resp 16   Ht 1.588 m (5' 2.5\")   Wt 68 kg (150 lb)   SpO2 93%   BMI 27.00 kg/m²   Appearance: Well-nourished, well-developed, no acute distress  Eyes:  PERRLA, EOMI  ENMT: without lesions, deformities;hearing grossly intact; tongue normal, posterior pharynx without erythema or exudate; Mallampati classification:   Neck: Supple, trachea midline, no masses  Respiratory effort:  No intercostal retractions or use of accessory muscles  Lung auscultation:  No wheezes rhonchi rubs or rales  Cardiac: No murmurs, rubs, or gallops; regular rhythm, normal rate; no edema  Abdomen:  No tenderness, no organomegaly  Musculoskeletal:  Grossly normal; gait and station normal; digits and nails normal  Skin:  No rashes, petechiae, cyanosis  Neurologic: without focal signs; oriented to person, time, place, and purpose; judgement intact  Psychiatric:  No depression, anxiety, agitation        PROBLEMS:  1. Obstructive sleep apnea-hypopnea syndrome    - zolpidem (AMBIEN) 5 MG Tab; Take 1 Tab by mouth at bedtime as needed for up to 3 doses. Take 1-3 tabs prn  Dispense: 3 Tab; Refill: 0  - Polysomnography, 4 or More; Future    2. Non-smoker      3. Nocturnal hypoxemia      4. Essential hypertension, benign      5. Up to date with influenza vaccination      6. Overweight (BMI 25.0-29.9)          PLAN:   The patient has signs and symptoms consistent with obstructive sleep apnea hypopnea syndrome.  Her overnight oximetry performed 2/3/2018 is consistent with severe obstructive sleep apnea.  Will schedule to have a nocturnal polysomnogram using zolpidem to assist with " sleep onset and maintenance should the need arise. Will return after the results are available to determine further diagnostic needs and/or treatment options.    The risks of untreated sleep apnea were discussed with the patient at length. Patients with KAMRYN are at increased risk of cardiovascular disease including coronary artery disease, systemic arterial hypertension, pulmonary arterial hypertension, cardiac arrythmias, and stroke. KAMRYN patients have an increased risk of motor vehicle accidents, type 2 diabetes, chronic kidney disease, and non-alcoholic liver disease. The patient was advised to avoid driving a motor vehicle when drowsy.    Positive airway pressure, such as CPAP, is considered first-line and preferred therapy for sleep apnea and may reverse both symptoms and risks.     Return for after sleep study.

## 2018-11-02 ENCOUNTER — OFFICE VISIT (OUTPATIENT)
Dept: CARDIOLOGY | Facility: MEDICAL CENTER | Age: 78
End: 2018-11-02
Payer: MEDICARE

## 2018-11-02 VITALS
BODY MASS INDEX: 27.6 KG/M2 | WEIGHT: 150 LBS | HEIGHT: 62 IN | OXYGEN SATURATION: 96 % | SYSTOLIC BLOOD PRESSURE: 130 MMHG | HEART RATE: 66 BPM | DIASTOLIC BLOOD PRESSURE: 80 MMHG

## 2018-11-02 DIAGNOSIS — I10 ESSENTIAL HYPERTENSION, BENIGN: ICD-10-CM

## 2018-11-02 LAB — TSH RECEP AB SER-ACNC: <0.9 IU/L

## 2018-11-02 PROCEDURE — 99213 OFFICE O/P EST LOW 20 MIN: CPT | Performed by: INTERNAL MEDICINE

## 2018-11-02 ASSESSMENT — ENCOUNTER SYMPTOMS
BLURRED VISION: 0
PALPITATIONS: 0
NEUROLOGICAL NEGATIVE: 1
CONSTITUTIONAL NEGATIVE: 1
VOMITING: 0
DIARRHEA: 0
NAUSEA: 1
SHORTNESS OF BREATH: 0
WEAKNESS: 0

## 2018-11-02 NOTE — PROGRESS NOTES
Chief Complaint   Patient presents with   • Hypertension     follow up       Subjective:   Danica Rapp is a 77 y.o. female who presents today for blood pressure check.  Recently, her valsartan/HCTZ dose was increased.  With an increase of the hydrochlorothiazide content.  She complains of some nausea.  Also since last visit she has been diagnosed with sleep apnea and has seen pulmonary.  Formal sleep study is pending.  Her depression seems as listed and she is doing quite a bit of traveling.  She plans to go to [facility], [place], and the [facility].    Past Medical History:   Diagnosis Date   • Chickenpox    • Essential hypertension, benign 6/5/2012   • GERD (gastroesophageal reflux disease)    • [place] measles    • Mumps    • Nasal drainage    • Nonspecific abnormal electrocardiogram (ECG) (EKG) 6/5/2012   • Other chest pain 6/5/2012     Past Surgical History:   Procedure Laterality Date   • CHOLECYSTECTOMY     • HYSTERECTOMY LAPAROSCOPY       Family History   Problem Relation Age of Onset   • Lung Disease Mother    • Lung Disease Father    • Cancer Father      Social History     Social History   • Marital status:      Spouse name: N/A   • Number of children: N/A   • Years of education: N/A     Occupational History   • Not on file.     Social History Main Topics   • Smoking status: Never Smoker   • Smokeless tobacco: Never Used   • Alcohol use 1.2 - 3.0 oz/week     2 - 4 Glasses of wine per week   • Drug use: No   • Sexual activity: Not on file     Other Topics Concern   • Not on file     Social History Narrative   • No narrative on file     Allergies   Allergen Reactions   • Macrodantin [Nitrofurantoin Macrocrystal] Itching and Swelling   • Amlodipine Swelling   • Augmentin Itching     Headaches   • Z-Pako [Azithromycin] Itching and Nausea     Outpatient Encounter Prescriptions as of 11/2/2018   Medication Sig Dispense Refill   • valsartan-hydrochlorothiazide (DIOVAN-HCT) 320-25 MG per tablet TAKE 1  "TABLET BY MOUTH EVERY DAY 90 Tab 3   • carvedilol (COREG) 12.5 MG Tab Take 1 Tab by mouth 2 times a day, with meals. 180 Tab 3   • potassium chloride (KLOR-CON) 8 MEQ tablet Take 1 Tab by mouth every day. 90 Tab 3   • Cranberry (THERACRAN PO) Take  by mouth.     • vitamin D (CHOLECALCIFEROL) 1000 UNIT Tab Take 1,000 Units by mouth every day.     • Probiotic Product (PROBIOTIC DAILY PO) Take 1 Each by mouth every day.     • estradiol (VIVELLE DOT) 0.1 MG/24HR PTTW Apply 1 Patch to skin as directed every 7 days.     • Calcium Carbonate-Vitamin D (CALCIUM + D PO) Take 500 mg by mouth every day.     • Multiple Vitamin (MULTI-VITAMIN DAILY PO) Take  by mouth every day.     • estradiol (ESTRACE) 1 MG TABS Take 1 mg by mouth every day.     • omeprazole (PRILOSEC) 20 MG CPDR Take 20 mg by mouth every day.       • zolpidem (AMBIEN) 5 MG Tab Take 1 Tab by mouth at bedtime as needed for up to 3 doses. Take 1-3 tabs prn (Patient not taking: Reported on 11/2/2018) 3 Tab 0     No facility-administered encounter medications on file as of 11/2/2018.      Review of Systems   Constitutional: Negative.  Negative for malaise/fatigue.   Eyes: Negative for blurred vision.   Respiratory: Negative for shortness of breath.         Recently diagnosed with sleep apnea   Cardiovascular: Negative for chest pain, palpitations and leg swelling.   Gastrointestinal: Positive for nausea. Negative for diarrhea and vomiting.   Neurological: Negative.  Negative for weakness.        Objective:   /80 (BP Location: Left arm, Patient Position: Sitting, BP Cuff Size: Adult)   Pulse 66   Ht 1.575 m (5' 2\")   Wt 68 kg (150 lb)   SpO2 96%   BMI 27.44 kg/m²     Physical Exam   Constitutional: She is oriented to person, place, and time. No distress.   Her physical exam is normal and unchanged from the prior visit.   HENT:   Head: Normocephalic and atraumatic.   Eyes: Pupils are equal, round, and reactive to light. No scleral icterus.   Neck: No JVD " present. No tracheal deviation present.   Cardiovascular: Normal rate and regular rhythm.    No murmur heard.  Pulmonary/Chest: No respiratory distress. She has no wheezes.   Abdominal: Soft. She exhibits no distension. There is no tenderness.   Musculoskeletal: She exhibits no edema.   Neurological: She is alert and oriented to person, place, and time.   Skin: Skin is warm.   Psychiatric: She has a normal mood and affect.       Assessment:     1. Essential hypertension, benign         Medical Decision Making:  Today's Assessment / Status / Plan:   Hypertension: Her blood pressure was initially elevated in the 155 range.  A repeat Negro testing is 138 systolic.  She complains of some nausea the etiology of which I am not sure.  Hopefully with treatment of her sleep apnea her blood pressures will improve.  For the moment, keep blood pressure medications the same.  Hopefully with treatment of her sleep apnea, her pressures will improve.

## 2018-11-05 LAB — TSI ACT/NOR SER: 105 %

## 2018-11-30 ENCOUNTER — TELEPHONE (OUTPATIENT)
Dept: CARDIOLOGY | Facility: MEDICAL CENTER | Age: 78
End: 2018-11-30

## 2018-11-30 NOTE — TELEPHONE ENCOUNTER
concerns about medication changes   Received: Today   Message Contents   SHELBI Fam/Alessio       Patient says her PCP wants to change one of the medications Dr. Garrett put her on, and she's not comfortable with the change. She can be reached at [phone], she is home until 12:30pm.      Pt explains that she saw Dr. Adler yesterday and d/t hyponatremia seen on 10/30 labs (Na 130) pcp decreased her valsartan-hctz to 320-12.5mg (previously 25mg).    Pts blood pressure in PCP office was 151/77.     She has been suffering from daily headache, starts when she wakes up in the morning and doesn't go away until she takes tramadol or NSAIDs. PCP thinks that HA may be related to low sodium. Also c/o some balance issues, but no falls, and dental issues.     Pt does not want to change her BP med until Dr. Garrett approves and agrees that its a good idea. She has not had a form sleep study yet as she is going to [facility], sleep study scheduled for 1/16. Pt aware that Dr. Garrett will be back in office on Tuesday.     To Dr. Garrett

## 2018-12-07 ENCOUNTER — TELEPHONE (OUTPATIENT)
Dept: CARDIOLOGY | Facility: MEDICAL CENTER | Age: 78
End: 2018-12-07

## 2018-12-07 NOTE — TELEPHONE ENCOUNTER
----- Message from Emma Rico sent at 12/7/2018  8:15 AM PST -----  Regarding: follow up to last wk's conversation  Contact: [phone]  JESSICA/kaylie  Pt calling to follow up to a conversation she had with Alessio last week.  Please call pt on Cell# [phone].  Pt w/b at doctor appt & won't be available from 12:30-3pm today.  Please call prior to or after that time.

## 2018-12-07 NOTE — TELEPHONE ENCOUNTER
"Spoke with Danica. See previous  Note 11-30-18. Dr. Adler was concerned about low NA (130 on 10-30-18) with Diovan -25mg QD. Dr. Adler RX'd Diovan -12.5mg QD.  Pt. Doesn't want to take ;lower dose of HCT. Na has been fairly stable at 131-130 for over a year. She will drink less plain water (she needs to hydrate to prevent UTI's but will try cranberry juice, tea, etc.)  Pt. Is leaving for 3 week trip to [place] 12-17-18 and doesn't want to make any changes before this trip.  She reports a \"dull\", persistent headache which she attribute to dental pain from chipped tooth/infection. She has appointment today with ENT to ensure it is not a sinus headache.   To Dr. Garrett.   "

## 2018-12-13 RX ORDER — VALSARTAN AND HYDROCHLOROTHIAZIDE 320; 12.5 MG/1; MG/1
1 TABLET, FILM COATED ORAL DAILY
Qty: 90 TAB | Refills: 3 | Status: SHIPPED | OUTPATIENT
Start: 2018-12-13 | End: 2019-11-16 | Stop reason: SDUPTHER

## 2018-12-13 NOTE — TELEPHONE ENCOUNTER
Called pt. To advise. New RX sent to [facility].      Patient Calls     Christo Garrett M.D.   You 20 hours ago (12:18 PM)      Agree with primary MD about lowering HCTZ dose (Routing comment)

## 2019-01-04 NOTE — PROGRESS NOTES
CC: Follow up for sleep study results    HPI:  Ms. Danica Rapp  is a 76 y/o F kindly referred by Dr. Christo Garrett MD for evaluation and management of possible sleep disordered breathing and seen as a new patient on 11/1/2008 18.  In February 2018 she had an overnight oximetry which was strongly suggestive of sleep apnea.    Symptoms included excessive daytime somnolence, nonrestorative sleep, and loud snoring.    Her sleep study was performed on 1/16/2018 and showed mild obstructive sleep apnea hypopnea with an AHI of 9.5 and a zachary saturation of 86%.  She did not meet the split night protocol.  Mean event duration was 24.8 seconds and the longest lasted 53.1 seconds.  Saturations were less than 90% for 7.2% of the recording.  The REM AHI was 20.8.  She never slept in the supine position so if she ordinarily does the study may be underestimating the severity of her problem.      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   • Hypokalemia 07/15/2014   • Nonspecific abnormal electrocardiogram (ECG) (EKG) 06/05/2012   • Other chest pain 06/05/2012   • Essential hypertension, benign 06/05/2012       Past Medical History:   Diagnosis Date   • Chickenpox    • Essential hypertension, benign 6/5/2012   • GERD (gastroesophageal reflux disease)    • [place] measles    • Mumps    • Nasal drainage    • Nonspecific abnormal electrocardiogram (ECG) (EKG) 6/5/2012   • Other chest pain 6/5/2012        Past Surgical History:   Procedure Laterality Date   • CHOLECYSTECTOMY     • HYSTERECTOMY LAPAROSCOPY         Family History   Problem Relation Age of Onset   • Lung Disease Mother    • Lung Disease Father    • Cancer Father        Social History  "    Social History   • Marital status:      Spouse name: N/A   • Number of children: N/A   • Years of education: N/A     Occupational History   • Not on file.     Social History Main Topics   • Smoking status: Never Smoker   • Smokeless tobacco: Never Used   • Alcohol use 1.2 - 1.8 oz/week     2 Glasses of wine per week      Comment: occ   • Drug use: No   • Sexual activity: Not on file     Other Topics Concern   • Not on file     Social History Narrative   • No narrative on file       Current Outpatient Prescriptions   Medication Sig Dispense Refill   • zolpidem (AMBIEN) 5 MG Tab Take 1 Tab by mouth at bedtime as needed for up to 3 doses. Take 1-3 tabs prn 3 Tab 0   • valsartan-hydrochlorothiazide (DIOVAN-HCT) 320-12.5 MG per tablet Take 1 Tab by mouth every day. 90 Tab 3   • carvedilol (COREG) 12.5 MG Tab Take 1 Tab by mouth 2 times a day, with meals. 180 Tab 3   • potassium chloride (KLOR-CON) 8 MEQ tablet Take 1 Tab by mouth every day. 90 Tab 3   • Cranberry (THERACRAN PO) Take  by mouth.     • vitamin D (CHOLECALCIFEROL) 1000 UNIT Tab Take 1,000 Units by mouth every day.     • Probiotic Product (PROBIOTIC DAILY PO) Take 1 Each by mouth every day.     • estradiol (VIVELLE DOT) 0.1 MG/24HR PTTW Apply 1 Patch to skin as directed every 7 days.     • Calcium Carbonate-Vitamin D (CALCIUM + D PO) Take 500 mg by mouth every day.     • Multiple Vitamin (MULTI-VITAMIN DAILY PO) Take  by mouth every day.     • estradiol (ESTRACE) 1 MG TABS Take 1 mg by mouth every day.     • omeprazole (PRILOSEC) 20 MG CPDR Take 20 mg by mouth every day.         No current facility-administered medications for this visit.     \"CURRENT RX\"    ALLERGIES: Macrodantin [nitrofurantoin macrocrystal]; Amlodipine; Augmentin; and Z-penelope [azithromycin]    ROS    Constitutional: Denies fever, chills, sweats,  weight loss, fatigue  Cardiovascular: Denies chest pain, tightness, palpitations, positive for swelling in legs/feet when she travels, " "fainting, difficulty breathing when lying down but gets better when sitting up.   Respiratory: Denies shortness of breath, cough, sputum, wheezing, painful breathing, coughing up blood.   Sleep: per HPI  Gastrointestinal: Denies  difficulty swallowing, nausea, abdominal pain, diarrhea, constipation, positive for heartburn.  Genitourinary: Frequent painful urination with urinary tract infections  Musculoskeletal: Denies painful joints, sore muscles, back pain.        PHYSICAL EXAM  Slightly overweight but not obese    /82 (BP Location: Right arm, Patient Position: Sitting, BP Cuff Size: Adult)   Pulse 80   Resp 15   Ht 1.575 m (5' 2\")   Wt 65.8 kg (145 lb)   SpO2 96%   BMI 26.52 kg/m²   Appearance: Well-nourished, well-developed, no acute distress  Eyes:  PERRLA, EOMI  ENMT: without lesions, deformities;hearing grossly intact; tongue normal, posterior pharynx without erythema or exudate;   Neck: Supple, trachea midline, no masses  Respiratory effort:  No intercostal retractions or use of accessory muscles  Lung auscultation:  No wheezes rhonchi rubs or rales  Cardiac: No murmurs, rubs, or gallops; regular rhythm, normal rate; no edema  Abdomen:  No tenderness, no organomegaly  Musculoskeletal:  Grossly normal; gait and station normal; digits and nails normal  Skin:  No rashes, petechiae, cyanosis  Neurologic: without focal signs; oriented to person, time, place, and purpose; judgement intact  Psychiatric:  No depression, anxiety, agitation        PROBLEMS:  1. Obstructive sleep apnea-hypopnea syndrome    - zolpidem (AMBIEN) 5 MG Tab; Take 1 Tab by mouth at bedtime as needed for up to 3 doses. Take 1-3 tabs prn  Dispense: 3 Tab; Refill: 0  - Polysomnography Titration; Future    2. Non-smoker      3. Overweight      4. Essential hypertension, benign      5. Up to date with influenza vaccination      6. Overweight (BMI 25.0-29.9)    PLAN:   Given her symptoms, I have recommended she return for a positive " airway pressure titration with as needed zolpidem use as needed.  She will return to clinic thereafter for results.

## 2019-01-16 ENCOUNTER — SLEEP STUDY (OUTPATIENT)
Dept: SLEEP MEDICINE | Facility: MEDICAL CENTER | Age: 79
End: 2019-01-16
Attending: INTERNAL MEDICINE
Payer: MEDICARE

## 2019-01-16 DIAGNOSIS — G47.33 OBSTRUCTIVE SLEEP APNEA-HYPOPNEA SYNDROME: ICD-10-CM

## 2019-01-16 PROCEDURE — 95810 POLYSOM 6/> YRS 4/> PARAM: CPT | Performed by: FAMILY MEDICINE

## 2019-01-17 NOTE — PROCEDURES
Technical summary:The patient underwent a diagnostic polysomnogram. This was a 16 channel montage study to include a 6 channel EEG, a 2 channel EOG, and chin EMG, left and right leg EMG, a snore channel, a nasal pressure transducer, and a nasal oral airflow semester.   Respiratory effort was assessed with the use of a thoracic and abdominal monitor and overnight oximetry was obtained. Audio and video recordings were reviewed. This was a fully attended study and sleep stage scoring was performed. The test was technically adequate.       Scoring Criteria: A modification of the the AASM Manual for the Scoring of Sleep and Associated Events, 2012, was used.   Obstructive apnea was scored by cessation of airflow for at least 10 seconds with continuing respiratory effort.  Central apnea was scored by cessation of airflow for at least 10 seconds with no effort.  Hypopnea was scored by a 30% or more reduction in airflow for at least 10 seconds accompanied by an arterial oxygen desaturation of 3% or more.  (For Medicare patients, hypopneas were scored by a 30% or more reduction in airflow for at least 10 seconds accompanied by an arterial oxygen saturation of 4% or more, as required by their insurance, CMS.    General sleep summary:  General sleep summary:  During the overnight study, the sleep latency was 45 min which is prolonged. The REM latency was 85 which is normal. The total sleep time was 278 min and sleep efficiency was 72 % which is decreased.  Sleep stage proportions showed increased WASO otherwise normal sleep.  In regards to sleep quality there was a mild degree of sleep fragmentation as shown by the arousal index of 10 an hourThe arousals were due to spontaneous arousal.    Respiratory summary: During the overnight study, the patient demonstrated mild obstructive sleep apnea as shown by the apnea hypopnea index of 9.5/hr. There was a total of 16 apneas and 28 hypopneas. In the supine position the respiratory  disturbance index was 0 an hour and in the non-supine position the respiratory disturbance index was 9.5 per hour. The respiratory events were associated with O2 zachary of 86% and average O2 saturation of 92%. She spent 8 min of sleep time below 89% O2 saturation. There was snoring. The patient demonstrated a NSR with an average heartbeat of 73 bpm.     Periodic limb movement summary: The patient demonstrated an normal degree of periodic limb movements as shown by the PLM index of 0 per hour.      Impression:  1.  Mild OAS with AHI of 9.5/hr and O2 zachary 86 %    Recommendations:  Recommend the patient return for a CPAP titration. In some cases alternative treatment options may prove effective in resolving sleep apnea and these options include upper airway surgery, the use of a dental orthotic or weight loss and positional therapy. Clinical correlation is required. In general patients with sleep apnea are advised to avoid alcohol and sedatives and to not operate a motor vehicle while drowsy and are at a greater risk for cardiovascular disease.

## 2019-01-22 ENCOUNTER — SLEEP CENTER VISIT (OUTPATIENT)
Dept: SLEEP MEDICINE | Facility: MEDICAL CENTER | Age: 79
End: 2019-01-22
Payer: MEDICARE

## 2019-01-22 VITALS
WEIGHT: 145 LBS | DIASTOLIC BLOOD PRESSURE: 82 MMHG | BODY MASS INDEX: 26.68 KG/M2 | SYSTOLIC BLOOD PRESSURE: 132 MMHG | HEIGHT: 62 IN | HEART RATE: 80 BPM | RESPIRATION RATE: 15 BRPM | OXYGEN SATURATION: 96 %

## 2019-01-22 DIAGNOSIS — E66.3 OVERWEIGHT: ICD-10-CM

## 2019-01-22 DIAGNOSIS — I10 ESSENTIAL HYPERTENSION, BENIGN: ICD-10-CM

## 2019-01-22 DIAGNOSIS — Z78.9 NON-SMOKER: ICD-10-CM

## 2019-01-22 DIAGNOSIS — Z92.29 UP TO DATE WITH INFLUENZA VACCINATION: ICD-10-CM

## 2019-01-22 DIAGNOSIS — G47.33 OBSTRUCTIVE SLEEP APNEA-HYPOPNEA SYNDROME: ICD-10-CM

## 2019-01-22 PROCEDURE — 99214 OFFICE O/P EST MOD 30 MIN: CPT | Performed by: INTERNAL MEDICINE

## 2019-01-22 RX ORDER — ZOLPIDEM TARTRATE 5 MG/1
5 TABLET ORAL NIGHTLY PRN
Qty: 3 TAB | Refills: 0 | Status: SHIPPED | OUTPATIENT
Start: 2019-01-22 | End: 2019-02-22

## 2019-01-23 ENCOUNTER — SLEEP STUDY (OUTPATIENT)
Dept: SLEEP MEDICINE | Facility: MEDICAL CENTER | Age: 79
End: 2019-01-23
Attending: INTERNAL MEDICINE
Payer: MEDICARE

## 2019-01-23 DIAGNOSIS — G47.33 OBSTRUCTIVE SLEEP APNEA-HYPOPNEA SYNDROME: ICD-10-CM

## 2019-01-23 PROBLEM — Z92.29 UP TO DATE WITH INFLUENZA VACCINATION: Status: ACTIVE | Noted: 2019-01-23

## 2019-01-23 PROCEDURE — 95811 POLYSOM 6/>YRS CPAP 4/> PARM: CPT | Performed by: INTERNAL MEDICINE

## 2019-01-23 NOTE — PROGRESS NOTES
CC: Follow up for Pap titration results    HPI:  Ms. Danica Rapp is a 78 y/o F kindly referred by Dr. Christo Garrett MD for evaluation and management of possible sleep disordered breathing and seen as a new patient on 11/1/2008 18. In February 2018 she had an overnight oximetry which was strongly suggestive of sleep apnea.   Symptoms included excessive daytime somnolence, nonrestorative sleep, and loud snoring.   Her sleep study was performed on 1/16/2018 and showed mild obstructive sleep apnea hypopnea with an AHI of 9.5 and a zachary saturation of 86%. She did not meet the split night protocol. Mean event duration was 24.8 seconds and the longest lasted 53.1 seconds. Saturations were less than 90% for 7.2% of the recording. The REM AHI was 20.8. She never slept in the supine position so if she ordinarily does the study may be underestimating the severity of her problem.    We performed a Pap titration on 1/23/2019.  Application of CPAP lead to the development of treatment emergent central apneas which comprised 53.7% of the events.  Consequently the patient was shifted to bilevel and did best on 11/7 cm H2O with the achievement of supine REM sleep, resultant AHI of 9.1, minimum saturation of 89%, and a mean saturation of 92.7%.  Patient does qualify for an auto SV titration.  He is not known to have any issues with her heart and has never had an echocardiogram performed.      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   • Hypokalemia 07/15/2014   • Nonspecific abnormal electrocardiogram (ECG) (EKG) 06/05/2012   • Other chest pain 06/05/2012   • Essential hypertension, benign  06/05/2012       Past Medical History:   Diagnosis Date   • Chickenpox    • Essential hypertension, benign 6/5/2012   • GERD (gastroesophageal reflux disease)    • [place] measles    • Mumps    • Nasal drainage    • Nonspecific abnormal electrocardiogram (ECG) (EKG) 6/5/2012   • Other chest pain 6/5/2012        Past Surgical History:   Procedure Laterality Date   • CHOLECYSTECTOMY     • HYSTERECTOMY LAPAROSCOPY         Family History   Problem Relation Age of Onset   • Lung Disease Mother    • Lung Disease Father    • Cancer Father        Social History     Social History   • Marital status:      Spouse name: N/A   • Number of children: N/A   • Years of education: N/A     Occupational History   • Not on file.     Social History Main Topics   • Smoking status: Never Smoker   • Smokeless tobacco: Never Used   • Alcohol use 1.2 - 1.8 oz/week     2 Glasses of wine per week      Comment: occ   • Drug use: No   • Sexual activity: Not on file     Other Topics Concern   • Not on file     Social History Narrative   • No narrative on file       Current Outpatient Prescriptions   Medication Sig Dispense Refill   • valsartan-hydrochlorothiazide (DIOVAN-HCT) 320-12.5 MG per tablet Take 1 Tab by mouth every day. 90 Tab 3   • carvedilol (COREG) 12.5 MG Tab Take 1 Tab by mouth 2 times a day, with meals. 180 Tab 3   • potassium chloride (KLOR-CON) 8 MEQ tablet Take 1 Tab by mouth every day. 90 Tab 3   • Cranberry (THERACRAN PO) Take  by mouth.     • vitamin D (CHOLECALCIFEROL) 1000 UNIT Tab Take 1,000 Units by mouth every day.     • Probiotic Product (PROBIOTIC DAILY PO) Take 1 Each by mouth every day.     • estradiol (VIVELLE DOT) 0.1 MG/24HR PTTW Apply 1 Patch to skin as directed every 7 days.     • Calcium Carbonate-Vitamin D (CALCIUM + D PO) Take 500 mg by mouth every day.     • Multiple Vitamin (MULTI-VITAMIN DAILY PO) Take  by mouth every day.     • estradiol (ESTRACE) 1 MG TABS Take 1 mg by mouth every day.     •  "omeprazole (PRILOSEC) 20 MG CPDR Take 20 mg by mouth every day.       • zolpidem (AMBIEN) 5 MG Tab Take 1 Tab by mouth at bedtime as needed for up to 3 doses. Take 1-3 tabs prn (Patient not taking: Reported on 1/24/2019) 3 Tab 0     No current facility-administered medications for this visit.     \"CURRENT RX\"    ALLERGIES: Macrodantin [nitrofurantoin macrocrystal]; Amlodipine; Augmentin; and Z-penelope [azithromycin]    ROS  Constitutional: Denies fever, chills, sweats,  weight loss, fatigue  Cardiovascular: Denies chest pain, tightness, palpitations, positive for swelling in legs/feet when she travels, fainting, difficulty breathing when lying down but gets better when sitting up.   Respiratory: Denies shortness of breath, cough, sputum, wheezing, painful breathing, coughing up blood.   Sleep: per HPI  Gastrointestinal: Denies  difficulty swallowing, nausea, abdominal pain, diarrhea, constipation, positive for heartburn.  Genitourinary: Frequent painful urination with urinary tract infections  Musculoskeletal: Denies painful joints, sore muscles, back pain      PHYSICAL EXAM  Slightly overweight but not obese    /80 (BP Location: Right arm, Patient Position: Sitting, BP Cuff Size: Adult)   Pulse 79   Resp 16   Ht 1.575 m (5' 2\")   Wt 65.8 kg (145 lb)   SpO2 96%   BMI 26.52 kg/m²   Appearance: Well-nourished, well-developed, no acute distress  Eyes:  PERRLA, EOMI  ENMT: without lesions, deformities;hearing grossly intact; tongue normal, posterior pharynx without erythema or exudate;   Neck: Supple, trachea midline, no masses  Respiratory effort:  No intercostal retractions or use of accessory muscles  Lung auscultation:  No wheezes rhonchi rubs or rales  Cardiac: No murmurs, rubs, or gallops; regular rhythm, normal rate; no edema  Abdomen:  No tenderness, no organomegaly  Musculoskeletal:  Grossly normal; gait and station normal; digits and nails normal  Skin:  No rashes, petechiae, cyanosis  Neurologic: " without focal signs; oriented to person, time, place, and purpose; judgement intact  Psychiatric:  No depression, anxiety, agitation        PROBLEMS:  1. Treatment-emergent central sleep apnea    - Polysomnography Titration; Future    2. Obstructive sleep apnea-hypopnea syndrome      3. Overweight      4. Non-smoker      5. Up to date with influenza vaccination      6. Essential hypertension, benign          PLAN:   We performed a Pap titration on 1/23/2019.  Application of CPAP lead to the development of treatment emergent central apneas which comprised 53.7% of the events.  Consequently the patient was shifted to bilevel and did best on 11/7 cm H2O with the achievement of supine REM sleep, resultant AHI of 9.1, minimum saturation of 89%, and a mean saturation of 92.7%.  Patient does qualify for an auto SV titration.  He is not known to have any issues with her heart and has never had an echocardiogram performed.    After a discussion of the options for treating treatment emergent central apnea, the patient agrees to return for an auto SV titration.  She will return to clinic thereafter for review.    Return for after sleep study.

## 2019-01-24 ENCOUNTER — SLEEP CENTER VISIT (OUTPATIENT)
Dept: SLEEP MEDICINE | Facility: MEDICAL CENTER | Age: 79
End: 2019-01-24
Payer: MEDICARE

## 2019-01-24 ENCOUNTER — SLEEP STUDY (OUTPATIENT)
Dept: SLEEP MEDICINE | Facility: MEDICAL CENTER | Age: 79
End: 2019-01-24
Attending: INTERNAL MEDICINE
Payer: MEDICARE

## 2019-01-24 VITALS
HEIGHT: 62 IN | RESPIRATION RATE: 16 BRPM | SYSTOLIC BLOOD PRESSURE: 138 MMHG | DIASTOLIC BLOOD PRESSURE: 80 MMHG | BODY MASS INDEX: 26.68 KG/M2 | WEIGHT: 145 LBS | OXYGEN SATURATION: 96 % | HEART RATE: 79 BPM

## 2019-01-24 DIAGNOSIS — E66.3 OVERWEIGHT: ICD-10-CM

## 2019-01-24 DIAGNOSIS — Z92.29 UP TO DATE WITH INFLUENZA VACCINATION: ICD-10-CM

## 2019-01-24 DIAGNOSIS — G47.39 TREATMENT-EMERGENT CENTRAL SLEEP APNEA: ICD-10-CM

## 2019-01-24 DIAGNOSIS — G47.33 OBSTRUCTIVE SLEEP APNEA-HYPOPNEA SYNDROME: ICD-10-CM

## 2019-01-24 DIAGNOSIS — I10 ESSENTIAL HYPERTENSION, BENIGN: ICD-10-CM

## 2019-01-24 DIAGNOSIS — Z78.9 NON-SMOKER: ICD-10-CM

## 2019-01-24 PROCEDURE — 95811 POLYSOM 6/>YRS CPAP 4/> PARM: CPT | Performed by: FAMILY MEDICINE

## 2019-01-24 PROCEDURE — 99214 OFFICE O/P EST MOD 30 MIN: CPT | Performed by: INTERNAL MEDICINE

## 2019-01-24 RX ORDER — PHENAZOPYRIDINE HYDROCHLORIDE 100 MG/1
TABLET, FILM COATED ORAL
Refills: 3 | COMMUNITY
Start: 2018-12-07 | End: 2019-01-15

## 2019-01-24 RX ORDER — TRAMADOL HYDROCHLORIDE 50 MG/1
TABLET ORAL
Refills: 0 | COMMUNITY
Start: 2018-11-16 | End: 2019-01-15

## 2019-01-24 RX ORDER — CIPROFLOXACIN 500 MG/1
TABLET, FILM COATED ORAL
Refills: 3 | COMMUNITY
Start: 2018-12-07 | End: 2019-01-15

## 2019-01-24 RX ORDER — LEVOFLOXACIN 500 MG/1
TABLET, FILM COATED ORAL
Refills: 1 | COMMUNITY
Start: 2018-12-07 | End: 2019-01-09

## 2019-01-24 NOTE — PROCEDURES
Clinical Comments:  The patient underwent a comprehensive polysomnogram using the standard montage for measurement of parameters of sleep, respiratory events, movement abnormalities, heart rate and rhythm. A microphone was used to monitor snoring.      INTERPRETATION:  Testing began at 10:31:30 PM.  The total recording time was 411.3 minutes with a sleep period of 375.3 minutes and the total sleep time was 269.0 minutes with a sleep efficiency of 65.4%.  The sleep latency was 36.0 minutes, and REM latency was 50.0 minutes.  The patient experienced 66 arousals in total, for an arousal index of 14.7    RESPIRATORY: The patient had 31 apneas in total.  Of these, 9 were obstructive apneas, and 22 were central apneas.  This resulted in an apnea index (AI) of 6.9.  The patient had 10 hypopneas, for a hypopnea index of 2.2.  The overall AHI was 9.1, while the AHI during Stage R sleep was 15.0.  AHI while supine was 18.3.    OXIMETRY: Oxygen saturation monitoring showed a mean SpO2 of 92.7%, with a minimum oxygen saturation of 88.0%.  Oxygen saturations were less than or = 89% for 0.5 minutes of sleep time.    CARDIAC: The highest heart rate during the recording was 92.0 beats per minute.  The average heart rate during sleep was 73.2 bpm.    LIMB MOVEMENTS: There were a total of 0 PLMs during sleep, of which 0 were PLMs arousals.  This resulted in a PLMS index of 0.0.    CPAP was tried from 5cm to 8cm. Bipap was tried from 9/5cm to 15/10cm H2O. Backup rate of 12.    RECORDING TECHNIQUE:       After the scalp was prepared, gold plated electrodes were applied to the scalp according to the International 10-20 System. EEG (electroencephalogram) was continuously monitored from the O1-M2, O2-M1, C3-M2, C4-M1, F3-M2, and F4-M1.   EOGs (electrooculograms) were monitored by electrodes placed at the left and right outer canthi.  Chin EMG (electromyogram) was monitored by electrodes placed on the mentalis and sub-mentalis muscles.   Nasal and oral airflow were monitored using a triple port thermocouple as well as oronasal pressure transducer.  Respiratory effort was measured by inductive plethysmography technology employing abdominal and thoracic belts.  Blood oxygen saturation and pulse were monitored by pulse oximetry.  Heart rhythm was monitored by surface electrocardiogram.  Leg EMG was studied using surface electrodes placed on left and right anterior tibialis.  A microphone was used to monitor tracheal sounds and snoring.  Body position was monitored and documented by technician observation      SUMMARY:    This was an overnight positive airway pressure titration polysomnogram.  The patient chose to use a small Dreamwear mask and heated humidification.     The total recording time was 411.3 minutes, the sleep period time was 375.3 minutes, and the total sleep time was 269 minutes.  The patient's sleep efficiency was 65.4 % which is reduced.  The patient experienced a normal 3 REM period(s).    The sleep stage durations revealed 106.3 minutes of wake after sleep onset (WASO), 52.5 minutes of N1 sleep, 173 minutes of N2 sleep, a reduced 3 minutes of N3 sleep, and 40 minutes of REM.    The latency to sleep was 36 minutes which is prolonged.  The latency to REM was 50 minutes which is slightly shortened.  Mild sleep fragmentation occurred.  The arousal index was 14.7.  The Total Limb Movement (isolated) Index was 0, the Limb Movement with Arousal Index was 0, and the PLM Series Index was 0.    The patient experienced 31 apneas and 10 hypopneas and 0 RERAS.  Central events comprised 53.7% of the total.  The apnea hypopnea index was 9.1, the RDI was 9.1, the mean event duration was 18.9 seconds, and the longest event lasted 28.9 seconds.  The REM index was 15.0 and the supine index was 18.3.  The apnea hypopnea index represents mild obstructive sleep apnea hypopnea syndrome during the Pap titration.    The zachary saturation during sleep was 88 %  and the patient spent 3.8 % of the recording with saturations less than or equal to 90%.    During sleep, the minimum heart rate was artifactual, the mean heart rate was 74 bpm, and the maximum heart rate was 92 bpm.    The technician initiated treatment with CPAP set at 5 cmH2O and increased the pressure to a maximum of 8 cmH2O. the patient developed treatment emergent central apneas in the technician initiated bilevel with IPAP ranging from 9-15 cm water and EPAP is ranging from 5-10 cm water.  The patient did best on bilevel 11/7 cm water with the achievement of supine REM sleep, resultant AHI of 9.1, a minimum saturation of 89%, and a mean saturation of 92.7%.    ASSESSMENT:    Treatment emergent central apneas  Improved AHI using bilevel 11/7 cm water with the achievement of supine REM, resultant AHI of 9.1, minimum saturation of 89% and mean saturation of 92.7%        RECOMMENDATION:    Recommend bilevel 11/7 cm water cmH2O using a mask of choice and heated humidification followed by data card and clinical review in 6-8 weeks.  The patient successfully used a small Dreamwear mask and heated humidification during the therapeutic challenge.

## 2019-01-25 NOTE — PROCEDURES
Technical summary: The patient underwent a ASV titration.  This was a 16 channel montage study to include a 6 channel EEG, a 2 channel EOG, and chin EMG, left and right leg EMG, a snore channel, and a CFLOW pressure transducer.   Respiratory effort was assessed with the use of a thoracic and abdominal monitor and overnight oximetry was obtained. Audio and video recordings were reviewed. This was a fully attended study and sleep stage scoring was performed. The test was technically adequate.    General sleep summary:  During the overnight study, the sleep latency was 4 min which is normal. The REM latency was 52 min which is decreased. The total sleep time was 67 min and sleep efficiency was 30 % which is decreased.  Sleep stage proportions showed decreased REM and increased WASO of 145 min.  In regards to sleep quality there was a mild degree of sleep fragmentation as shown by the arousal index of 10 an hour. The arousals were due to spontaneous arousal .    CPAP Titration:  The PAP titration was initiated with ASV EPAP min/max 4/15 cm PS min/max 2/20 cm of water and the pressure which was slowly titrated up in an attempt to eliminate sleep disordered breathing and snoring. The final pressure tested during the study was ASV EPAP min/max 5/15 cm PS min/max 2/20 cm  Water. The best tolerated pressure was ASV EPAP min/max 4/15 cm PS min/max 2/20 cm, at this pressure the patient was observed in supine REM sleep stage. The apnea hypopnea index improved to 3.7 per hour and O2 zachary 86%. The average O2 stauration was 92%. She spent 1.5 % of sleep time below 89% O2 saturation. Snoring was resolved. There were no significant periodic limb movements.  The patient demonstrated NSR and an average heart rate of 81 beats per minute.  There was no ventricular ectopy or tachyarrhythmias. The patient utilized small eson mask with heated humidification. The CPAP was well-tolerated and there were minimal air leaks. No supplemental  oxygen was required.    Impression:  1.  Complex sleep apnea  2.  Successful ASV titration     Recommendations:  I recommend ASV EPAP min/max 4/15 cm PS min/max 2/20 cm auto back up rate with small eson mask. Recommended 30 day compliance download to assess the efficacy of the recommended pressure and compliance for further outpatient monitoring and management of CPAP therapy. In some cases alternative treatment options may prove effective in resolving sleep apnea and these options include upper airway surgery, the use of a dental orthotic or weight loss and positional therapy. Clinical correlation is required. In general patients with sleep apnea are advised to avoid alcohol and sedatives and to not operate a motor vehicle while drowsy and are at a greater risk for cardiovascular disease.

## 2019-01-28 ENCOUNTER — SLEEP CENTER VISIT (OUTPATIENT)
Dept: SLEEP MEDICINE | Facility: MEDICAL CENTER | Age: 79
End: 2019-01-28
Payer: MEDICARE

## 2019-01-28 VITALS
WEIGHT: 147 LBS | OXYGEN SATURATION: 96 % | HEART RATE: 79 BPM | SYSTOLIC BLOOD PRESSURE: 150 MMHG | RESPIRATION RATE: 15 BRPM | HEIGHT: 62 IN | BODY MASS INDEX: 27.05 KG/M2 | DIASTOLIC BLOOD PRESSURE: 84 MMHG

## 2019-01-28 DIAGNOSIS — G47.33 OBSTRUCTIVE SLEEP APNEA-HYPOPNEA SYNDROME: ICD-10-CM

## 2019-01-28 PROCEDURE — 99213 OFFICE O/P EST LOW 20 MIN: CPT | Performed by: FAMILY MEDICINE

## 2019-01-28 NOTE — PROGRESS NOTES
[facility] Follow Up Note     Date: 1/28/2019 / Time: 11:53 AM    Patient ID:   Name:             Danica Rapp     YOB: 1940  Age:                 78 y.o.  female   MRN:               [id number]      Thank you for requesting a sleep medicine consultation on Danica Rapp at the sleep center. She presents today with the chief complaints of KAMRYN follow up.     HISTORY OF PRESENT ILLNESS:       Pt is currently currently not on PAP therapy. She goes to sleep around 11:30 pm and wakes up around 6-7 am. She is getting about 8 hrs of sleep on a good night and about 5-6 hr of sleep on a bad night. The bad nights are about 2-3 per week.      initiated with ASV EPAP min/max 4/15 cm PS min/max 2/20 cm of water and the pressure which was slowly titrated up in an attempt to eliminate sleep disordered breathing and snoring. The final pressure tested during the study was ASV EPAP min/max 5/15 cm PS min/max 2/20 cm  Water. The best tolerated pressure was ASV EPAP min/max 4/15 cm PS min/max 2/20 cm, at this pressure the patient was observed in supine REM sleep stage. The apnea hypopnea index improved to 3.7 per hour and O2 zachary 86%. The average O2 stauration was 92%. She spent 1.5 % of sleep time below 89% O2 saturation.    SLEEP HISTORY   PSG: mild obstructive sleep apnea hypopnea with an AHI of 9.5 and a zachary saturation of 86%. She did not meet the split night protocol. Mean event duration was 24.8 seconds and the longest lasted 53.1 seconds. Saturations were less than 90% for 7.2% of the recording. The REM AHI was 20.8.      REVIEW OF SYSTEMS:       Constitutional: Denies fevers, Denies weight changes  Eyes: Denies changes in vision, no eye pain  Ears/Nose/Throat/Mouth: Denies nasal congestion or sore throat   Cardiovascular: Denies chest pain or palpitations   Respiratory: Denies shortness of breath , Denies cough  Gastrointestinal/Hepatic: Denies abdominal pain, nausea, vomiting, diarrhea,  constipation or GI bleeding   Genitourinary: Deniesdysuria or frequency  Musculoskeletal/Rheum: Denies  joint pain and swelling   Skin/Breast: Denies rash,   Neurological: Denies headache, confusion, memory loss or focal weakness/parasthesias  Psychiatric: denies mood disorder   Sleep: + snoring     Comprehensive review of systems form is reviewed with the patient and is attached in the EMR.     PMH:  has a past medical history of Chickenpox; Essential hypertension, benign (6/5/2012); GERD (gastroesophageal reflux disease); Guyanese measles; Mumps; Nasal drainage; Nonspecific abnormal electrocardiogram (ECG) (EKG) (6/5/2012); and Other chest pain (6/5/2012).  MEDS:   Current Outpatient Prescriptions:   •  valsartan-hydrochlorothiazide (DIOVAN-HCT) 320-12.5 MG per tablet, Take 1 Tab by mouth every day., Disp: 90 Tab, Rfl: 3  •  carvedilol (COREG) 12.5 MG Tab, Take 1 Tab by mouth 2 times a day, with meals., Disp: 180 Tab, Rfl: 3  •  potassium chloride (KLOR-CON) 8 MEQ tablet, Take 1 Tab by mouth every day., Disp: 90 Tab, Rfl: 3  •  Cranberry (THERACRAN PO), Take  by mouth., Disp: , Rfl:   •  vitamin D (CHOLECALCIFEROL) 1000 UNIT Tab, Take 1,000 Units by mouth every day., Disp: , Rfl:   •  Probiotic Product (PROBIOTIC DAILY PO), Take 1 Each by mouth every day., Disp: , Rfl:   •  estradiol (VIVELLE DOT) 0.1 MG/24HR PTTW, Apply 1 Patch to skin as directed every 7 days., Disp: , Rfl:   •  Calcium Carbonate-Vitamin D (CALCIUM + D PO), Take 500 mg by mouth every day., Disp: , Rfl:   •  Multiple Vitamin (MULTI-VITAMIN DAILY PO), Take  by mouth every day., Disp: , Rfl:   •  estradiol (ESTRACE) 1 MG TABS, Take 1 mg by mouth every day., Disp: , Rfl:   •  omeprazole (PRILOSEC) 20 MG CPDR, Take 20 mg by mouth every day.  , Disp: , Rfl:   •  zolpidem (AMBIEN) 5 MG Tab, Take 1 Tab by mouth at bedtime as needed for up to 3 doses. Take 1-3 tabs prn (Patient not taking: Reported on 1/24/2019), Disp: 3 Tab, Rfl: 0  ALLERGIES:   Allergies  "  Allergen Reactions   • Macrodantin [Nitrofurantoin Macrocrystal] Itching and Swelling   • Amlodipine Swelling   • Augmentin Itching     Headaches   • Z-Pako [Azithromycin] Itching and Nausea     SURGHX:   Past Surgical History:   Procedure Laterality Date   • CHOLECYSTECTOMY     • HYSTERECTOMY LAPAROSCOPY       SOCHX:  reports that she has never smoked. She has never used smokeless tobacco. She reports that she drinks about 1.2 - 1.8 oz of alcohol per week . She reports that she does not use drugs..  FH:   Family History   Problem Relation Age of Onset   • Lung Disease Mother    • Lung Disease Father    • Cancer Father          Physical Exam:  Vitals/ General Appearance:   Weight/BMI: Body mass index is 26.89 kg/m².  Blood pressure 150/84, pulse 79, resp. rate 15, height 1.575 m (5' 2\"), weight 66.7 kg (147 lb), SpO2 96 %.  Vitals:    01/28/19 1144   BP: 150/84   BP Location: Right arm   Patient Position: Sitting   BP Cuff Size: Adult   Pulse: 79   Resp: 15   SpO2: 96%   Weight: 66.7 kg (147 lb)   Height: 1.575 m (5' 2\")       Pt. is alert and oriented to time, place and person. Cooperative and in no apparent distress.       1. Head: Atraumatic, normocephalic.   2. Ears: Normal tympanic membrane and no discharge  3. Nose: No inferior turbinate hypertophy, no septal deviation, no polyp.   4. Throat: Oropharynx appears crowded in that the palate is overhanging (Malam Adina scale 4).   5. Neck: Supple. No thyromegaly  6. Chest: Trachea central, no spine deformity   7. Lungs auscultation: B/L good air entry, vesicular breath sounds, no adventitious sounds  8. Heart auscultation: 1st and 2nd heart sounds normal, regular rhythm. No appreciable murmur.  9. Abdomen: Soft, non tender, no organomegaly. Bowel sounds present  10. Extremities: no clubbing, no pedal edema.  11. Skin: No rash  12. NEUROLOGICAL EXAMINATION: On neurological exam, the patient was alert and oriented x3. speech was clear and fluent without " dysarthria.      INVESTIGATIONS:           ASSESSMENT AND PLAN     1. Sleep Apnea (KAMRYN).    The pathophysiology of sleep anea and the increased risk of cardiovascular morbidity from untreated sleep apnea is discussed in detail with the patient.    She is urged to avoid supine sleep, weight gain and alcoholic beverages since all of these can worsen sleep apnea. She is cautioned against drowsy driving. If She feels sleepy while driving, She must pull over for a break/nap, rather than persist on the road, in the interest of She own safety and that of others on the road.   Plan   - Auto CPAP vs overnight CPAP titration vs dental appliance and surgeries was dicussed in detail. After informed discussion she would like to try dental appliance    - F/u with [facility] after OAT    - SS was reviewed and discussed with the pt   - compliance was reinforced     2.Regarding treatment of other past medical problems and general health maintenance,  She is urged to follow up with PCP.

## 2019-03-02 DIAGNOSIS — E87.6 HYPOKALEMIA: ICD-10-CM

## 2019-03-02 DIAGNOSIS — I10 ESSENTIAL HYPERTENSION, BENIGN: ICD-10-CM

## 2019-03-04 RX ORDER — POTASSIUM CHLORIDE 600 MG/1
TABLET, FILM COATED, EXTENDED RELEASE ORAL
Qty: 90 TAB | Refills: 3 | Status: SHIPPED | OUTPATIENT
Start: 2019-03-04 | End: 2020-03-09

## 2019-03-07 ENCOUNTER — TELEPHONE (OUTPATIENT)
Dept: PHYSICAL THERAPY | Facility: REHABILITATION | Age: 79
End: 2019-03-07

## 2019-03-07 NOTE — OP THERAPY DISCHARGE SUMMARY
Outpatient Physical Therapy  DISCHARGE SUMMARY NOTE      [address]  [address]  [place] [address]  Phone:  [phone]  Fax:  [phone]    Date of Visit: 03/07/2019    Patient: Danica Rapp  YOB: 1940  MRN: [id number]     Referring Provider:  Christo Garrett M.D.    Referring Diagnosis  Iliotibial band syndrome of right side M76.31        Physical Therapy Occurrence Codes    Date of onset of impairment:  2/24/18   Date physical therapy care plan established or reviewed:  8/23/18   Date physical therapy treatment started:  8/23/18        Your patient is being discharged from Physical Therapy with the following comments:   · Goals partially met    Visit #: 4     SUBJECTIVE:  Upon patient's last visit, she reported Less leg pain and felt stronger w/ one bout of R leg giving-way . Patient reported being able to walk 30' but slowly.    ASSESSMENT:   Fair- post chain strength and cont. Forward trunk lean     PLAN/RECOMMENDATIONS:   No patient contact since  9 / 6 / 18 .  Patient is independent with her HEP and is being discharged from therapy at this time.    Kyree Keita, PT, DPT, OCS    Date: 3/7/2019

## 2019-04-15 DIAGNOSIS — I10 ESSENTIAL HYPERTENSION: ICD-10-CM

## 2019-04-15 RX ORDER — CARVEDILOL 12.5 MG/1
12.5 TABLET ORAL 2 TIMES DAILY WITH MEALS
Qty: 180 TAB | Refills: 2 | Status: SHIPPED | OUTPATIENT
Start: 2019-04-15 | End: 2020-01-27

## 2019-05-02 ENCOUNTER — OFFICE VISIT (OUTPATIENT)
Dept: CARDIOLOGY | Facility: MEDICAL CENTER | Age: 79
End: 2019-05-02
Payer: MEDICARE

## 2019-05-02 VITALS
DIASTOLIC BLOOD PRESSURE: 70 MMHG | HEART RATE: 70 BPM | HEIGHT: 63 IN | WEIGHT: 150 LBS | SYSTOLIC BLOOD PRESSURE: 130 MMHG | BODY MASS INDEX: 26.58 KG/M2

## 2019-05-02 DIAGNOSIS — I10 ESSENTIAL HYPERTENSION, BENIGN: ICD-10-CM

## 2019-05-02 DIAGNOSIS — R06.09 DOE (DYSPNEA ON EXERTION): ICD-10-CM

## 2019-05-02 DIAGNOSIS — G47.34 NOCTURNAL HYPOXEMIA: ICD-10-CM

## 2019-05-02 DIAGNOSIS — G47.33 OBSTRUCTIVE SLEEP APNEA-HYPOPNEA SYNDROME: ICD-10-CM

## 2019-05-02 PROCEDURE — 99213 OFFICE O/P EST LOW 20 MIN: CPT | Performed by: INTERNAL MEDICINE

## 2019-05-02 ASSESSMENT — ENCOUNTER SYMPTOMS
CONSTITUTIONAL NEGATIVE: 1
BLURRED VISION: 0
DIARRHEA: 0
WEAKNESS: 0
NEUROLOGICAL NEGATIVE: 1
SHORTNESS OF BREATH: 1
NAUSEA: 0
PALPITATIONS: 0
VOMITING: 0

## 2019-05-02 NOTE — PROGRESS NOTES
Chief Complaint   Patient presents with   • HTN (Controlled)       Subjective:   Danica Rapp is a 78 y.o. female who presents today for follow-up of hypertension.  She is been feeling well except recently she noticed that she has some exertional dyspnea with climbing stairs well staying with friends at [facility].  No edema or palpitations.  She just returned from extended cruise to [place] and did well with a [place].  Patient has been diagnosed with sleep apnea but cannot tolerate CPAP or BiPAP.  She is considering dental appliance.    Past Medical History:   Diagnosis Date   • Chickenpox    • Essential hypertension, benign 6/5/2012   • GERD (gastroesophageal reflux disease)    • [place]bour[place] measles    • Mumps    • Nasal drainage    • Nonspecific abnormal electrocardiogram (ECG) (EKG) 6/5/2012   • Other chest pain 6/5/2012     Past Surgical History:   Procedure Laterality Date   • CHOLECYSTECTOMY     • HYSTERECTOMY LAPAROSCOPY       Family History   Problem Relation Age of Onset   • Lung Disease Mother    • Lung Disease Father    • Cancer Father      Social History     Social History   • Marital status:      Spouse name: N/A   • Number of children: N/A   • Years of education: N/A     Occupational History   • Not on file.     Social History Main Topics   • Smoking status: Never Smoker   • Smokeless tobacco: Never Used   • Alcohol use 1.2 - 1.8 oz/week     2 Glasses of wine per week      Comment: occ   • Drug use: No   • Sexual activity: Not on file     Other Topics Concern   • Not on file     Social History Narrative   • No narrative on file     Allergies   Allergen Reactions   • Macrodantin [Nitrofurantoin Macrocrystal] Itching and Swelling   • Amlodipine Swelling   • Augmentin Itching     Headaches   • Z-Pako [Azithromycin] Itching and Nausea     Outpatient Encounter Prescriptions as of 5/2/2019   Medication Sig Dispense Refill   • carvedilol (COREG) 12.5 MG Tab Take 1 Tab by mouth 2  "times a day, with meals. 180 Tab 2   • potassium chloride (KLOR-CON) 8 MEQ tablet TAKE 1 TABLET BY MOUTH EVERY DAY 90 Tab 3   • valsartan-hydrochlorothiazide (DIOVAN-HCT) 320-12.5 MG per tablet Take 1 Tab by mouth every day. 90 Tab 3   • Cranberry (THERACRAN PO) Take  by mouth.     • vitamin D (CHOLECALCIFEROL) 1000 UNIT Tab Take 1,000 Units by mouth every day.     • Probiotic Product (PROBIOTIC DAILY PO) Take 1 Each by mouth every day.     • estradiol (VIVELLE DOT) 0.1 MG/24HR PTTW Apply 1 Patch to skin as directed every 7 days.     • Calcium Carbonate-Vitamin D (CALCIUM + D PO) Take 500 mg by mouth every day.     • Multiple Vitamin (MULTI-VITAMIN DAILY PO) Take  by mouth every day.     • estradiol (ESTRACE) 1 MG TABS Take 1 mg by mouth every day.     • omeprazole (PRILOSEC) 20 MG CPDR Take 20 mg by mouth every day.         No facility-administered encounter medications on file as of 5/2/2019.      Review of Systems   Constitutional: Negative.  Negative for malaise/fatigue.   Eyes: Negative for blurred vision.   Respiratory: Positive for shortness of breath.         Diagnosed with sleep apnea.  He has not been able to tolerate CPAP or BiPAP felt short of breath when climbing stairs at [facility] recently.   Cardiovascular: Negative for chest pain, palpitations and leg swelling.   Gastrointestinal: Negative for diarrhea, nausea and vomiting.   Neurological: Negative.  Negative for weakness.        Objective:   /70 (BP Location: Left arm, Patient Position: Sitting, BP Cuff Size: Adult)   Pulse 70   Ht 1.588 m (5' 2.5\")   Wt 68 kg (150 lb)   BMI 27.00 kg/m²     Physical Exam   Constitutional: She is oriented to person, place, and time. No distress.   Her physical exam is normal and unchanged from the prior visit of November 2, 2018.   HENT:   Head: Normocephalic and atraumatic.   Eyes: Pupils are equal, round, and reactive to light. No scleral icterus.   Neck: No JVD present. No tracheal deviation present. "   Cardiovascular: Normal rate and regular rhythm.    No murmur heard.  Pulmonary/Chest: No respiratory distress. She has no wheezes.   Abdominal: Soft. She exhibits no distension. There is no tenderness.   Musculoskeletal: She exhibits no edema.   Neurological: She is alert and oriented to person, place, and time.   Skin: Skin is warm.   Psychiatric: She has a normal mood and affect.       Assessment:     1. Essential hypertension, benign  EC-ECHOCARDIOGRAM COMPLETE W/O CONT   2. Nocturnal hypoxemia  EC-ECHOCARDIOGRAM COMPLETE W/O CONT   3. BASSETT (dyspnea on exertion)  EC-ECHOCARDIOGRAM COMPLETE W/O CONT   4. Obstructive sleep apnea-hypopnea syndrome         Medical Decision Making:  Today's Assessment / Status / Plan:   Hypertension: Under good control on current medical regimen.  Exertional dyspnea: This is a new problem.  It may be due to age and deconditioning however pulmonary hypertension and diastolic dysfunction are strong considerations.  An echo will be obtained to evaluate her LV systolic function, diastolic function and pulmonary artery pressures.  We will notify her of the results and change medications if needed.  Otherwise return in 6 months.

## 2019-06-07 ENCOUNTER — APPOINTMENT (OUTPATIENT)
Dept: CARDIOLOGY | Facility: MEDICAL CENTER | Age: 79
End: 2019-06-07
Attending: INTERNAL MEDICINE
Payer: MEDICARE

## 2019-09-12 ENCOUNTER — HOSPITAL ENCOUNTER (OUTPATIENT)
Dept: CARDIOLOGY | Facility: MEDICAL CENTER | Age: 79
End: 2019-09-12
Attending: INTERNAL MEDICINE
Payer: MEDICARE

## 2019-09-12 DIAGNOSIS — G47.34 NOCTURNAL HYPOXEMIA: ICD-10-CM

## 2019-09-12 DIAGNOSIS — I10 ESSENTIAL HYPERTENSION, BENIGN: ICD-10-CM

## 2019-09-12 DIAGNOSIS — R06.09 DOE (DYSPNEA ON EXERTION): ICD-10-CM

## 2019-09-12 LAB
LV EJECT FRACT  99904: 70
LV EJECT FRACT MOD 2C 99903: 67.24
LV EJECT FRACT MOD 4C 99902: 60.55
LV EJECT FRACT MOD BP 99901: 62.06

## 2019-09-12 PROCEDURE — 93306 TTE W/DOPPLER COMPLETE: CPT | Mod: 26 | Performed by: INTERNAL MEDICINE

## 2019-09-12 PROCEDURE — 93306 TTE W/DOPPLER COMPLETE: CPT

## 2019-09-16 ENCOUNTER — TELEPHONE (OUTPATIENT)
Dept: CARDIOLOGY | Facility: MEDICAL CENTER | Age: 79
End: 2019-09-16

## 2019-09-16 NOTE — TELEPHONE ENCOUNTER
----- Message from Christo Garrett M.D. sent at 9/16/2019  3:12 PM PDT -----  Echo reviewed and looks good.  LV function is normal and there is no evidence of pulmonary hypertension.  There is nothing on the echo that would explain her dyspnea.

## 2019-09-18 ENCOUNTER — TELEPHONE (OUTPATIENT)
Dept: CARDIOLOGY | Facility: MEDICAL CENTER | Age: 79
End: 2019-09-18

## 2019-09-18 NOTE — TELEPHONE ENCOUNTER
JESSICA Mckeon    Pt would like to get her test results. She said that Adrienne had called her and left her a mssg. She wanted a call back at: [phone].    Thank you so much,    Teagan

## 2019-09-18 NOTE — TELEPHONE ENCOUNTER
----- Message from Christo Garrett M.D. sent at 9/16/2019  3:12 PM PDT -----  Echo reviewed and looks good.  LV function is normal and there is no evidence of pulmonary hypertension.  There is nothing on the echo that would explain her dyspnea.    =============================    Returned pt's call and notified of echo results as above. She is appreciative of call and will plan on seeing RS at her appt 11/27/19.

## 2019-11-16 DIAGNOSIS — I10 ESSENTIAL HYPERTENSION, BENIGN: Primary | ICD-10-CM

## 2019-11-18 RX ORDER — VALSARTAN AND HYDROCHLOROTHIAZIDE 320; 12.5 MG/1; MG/1
TABLET, FILM COATED ORAL
Qty: 90 TAB | Refills: 2 | Status: SHIPPED | OUTPATIENT
Start: 2019-11-18 | End: 2020-08-17

## 2019-11-27 ENCOUNTER — OFFICE VISIT (OUTPATIENT)
Dept: CARDIOLOGY | Facility: MEDICAL CENTER | Age: 79
End: 2019-11-27
Payer: MEDICARE

## 2019-11-27 VITALS
SYSTOLIC BLOOD PRESSURE: 142 MMHG | BODY MASS INDEX: 26.22 KG/M2 | HEIGHT: 63 IN | HEART RATE: 86 BPM | DIASTOLIC BLOOD PRESSURE: 82 MMHG | OXYGEN SATURATION: 96 % | WEIGHT: 148 LBS

## 2019-11-27 DIAGNOSIS — I10 ESSENTIAL HYPERTENSION, BENIGN: ICD-10-CM

## 2019-11-27 PROCEDURE — 99213 OFFICE O/P EST LOW 20 MIN: CPT | Performed by: INTERNAL MEDICINE

## 2019-11-27 ASSESSMENT — ENCOUNTER SYMPTOMS
MUSCULOSKELETAL NEGATIVE: 1
GASTROINTESTINAL NEGATIVE: 1
CARDIOVASCULAR NEGATIVE: 1
CONSTITUTIONAL NEGATIVE: 1
NEUROLOGICAL NEGATIVE: 1
RESPIRATORY NEGATIVE: 1

## 2019-11-28 NOTE — PROGRESS NOTES
Chief Complaint   Patient presents with   • Hypertension       Subjective:   Danica Rapp is a 79 y.o. female who presents today for follow-up of hypertension.  Is been traveling.  She has not been exercising much.  She does not check her pressures at home.  She does have some nausea with carvedilol she does not take with food.  No other interval problems.  She is planning a trip to the [place] in the spring and [place] in the fall.    The patient had a very difficult time with grief reaction at the time of her 's death.  This has now resolved and she is traveling.    Past Medical History:   Diagnosis Date   • Chickenpox    • Essential hypertension, benign 6/5/2012   • GERD (gastroesophageal reflux disease)    • Bulgarian measles    • Mumps    • Nasal drainage    • Nonspecific abnormal electrocardiogram (ECG) (EKG) 6/5/2012   • Other chest pain 6/5/2012     Past Surgical History:   Procedure Laterality Date   • CHOLECYSTECTOMY     • HYSTERECTOMY LAPAROSCOPY       Family History   Problem Relation Age of Onset   • Lung Disease Mother    • Lung Disease Father    • Cancer Father      Social History     Socioeconomic History   • Marital status:      Spouse name: Not on file   • Number of children: Not on file   • Years of education: Not on file   • Highest education level: Not on file   Occupational History   • Not on file   Social Needs   • Financial resource strain: Not on file   • Food insecurity:     Worry: Not on file     Inability: Not on file   • Transportation needs:     Medical: Not on file     Non-medical: Not on file   Tobacco Use   • Smoking status: Never Smoker   • Smokeless tobacco: Never Used   Substance and Sexual Activity   • Alcohol use: Yes     Alcohol/week: 1.2 - 1.8 oz     Types: 2 Glasses of wine per week     Comment: occ   • Drug use: No   • Sexual activity: Not on file   Lifestyle   • Physical activity:     Days per week: Not on file     Minutes per session: Not on file   •  Stress: Not on file   Relationships   • Social connections:     Talks on phone: Not on file     Gets together: Not on file     Attends [employer] service: Not on file     Active member of club or organization: Not on file     Attends meetings of clubs or organizations: Not on file     Relationship status: Not on file   • Intimate partner violence:     Fear of current or ex partner: Not on file     Emotionally abused: Not on file     Physically abused: Not on file     Forced sexual activity: Not on file   Other Topics Concern   • Not on file   Social History Narrative   • Not on file     Allergies   Allergen Reactions   • Macrodantin [Nitrofurantoin Macrocrystal] Itching and Swelling   • Amlodipine Swelling   • Augmentin Itching     Headaches   • Z-Pako [Azithromycin] Itching and Nausea     Outpatient Encounter Medications as of 11/27/2019   Medication Sig Dispense Refill   • valsartan-hydrochlorothiazide (DIOVAN-HCT) 320-12.5 MG per tablet TAKE 1 TABLET BY MOUTH EVERY DAY 90 Tab 2   • carvedilol (COREG) 12.5 MG Tab Take 1 Tab by mouth 2 times a day, with meals. 180 Tab 2   • potassium chloride (KLOR-CON) 8 MEQ tablet TAKE 1 TABLET BY MOUTH EVERY DAY 90 Tab 3   • Cranberry (THERACRAN PO) Take  by mouth.     • vitamin D (CHOLECALCIFEROL) 1000 UNIT Tab Take 1,000 Units by mouth every day.     • Probiotic Product (PROBIOTIC DAILY PO) Take 1 Each by mouth every day.     • estradiol (VIVELLE DOT) 0.1 MG/24HR PTTW Apply 1 Patch to skin as directed every 7 days.     • Calcium Carbonate-Vitamin D (CALCIUM + D PO) Take 500 mg by mouth every day.     • Multiple Vitamin (MULTI-VITAMIN DAILY PO) Take  by mouth every day.     • estradiol (ESTRACE) 1 MG TABS Take 1 mg by mouth every day.     • omeprazole (PRILOSEC) 20 MG CPDR Take 20 mg by mouth every day.         No facility-administered encounter medications on file as of 11/27/2019.      Review of Systems   Constitutional: Negative.    HENT: Negative.    Respiratory: Negative.   "  Cardiovascular: Negative.    Gastrointestinal: Negative.    Musculoskeletal: Negative.    Skin: Negative.    Neurological: Negative.    Endo/Heme/Allergies: Negative.         Objective:   /82 (BP Location: Left arm, Patient Position: Sitting, BP Cuff Size: Adult)   Pulse 86   Ht 1.588 m (5' 2.5\")   Wt 67.1 kg (148 lb)   SpO2 96%   BMI 26.64 kg/m²     Physical Exam   Constitutional: She is oriented to person, place, and time. No distress.   HENT:   Head: Normocephalic and atraumatic.   Eyes: Pupils are equal, round, and reactive to light. No scleral icterus.   Neck: No JVD present.   Cardiovascular: Normal rate and regular rhythm.   No murmur heard.  Pulmonary/Chest: No respiratory distress. She has no wheezes.   Abdominal: Soft. She exhibits no distension. There is no tenderness.   Musculoskeletal:         General: No edema.   Neurological: She is alert and oriented to person, place, and time.   Skin: Skin is warm.   Psychiatric: She has a normal mood and affect.       Assessment:     1. Essential hypertension, benign         Medical Decision Making:  Today's Assessment / Status / Plan:   Hypertension: Blood pressure by my reading is consistently 152 systolic.  She does not want to take any more medications.  Discussed weight loss and increased physical activity as non-pharmacologic ways for blood pressure control.  She will keep her medicines the same return in 6 months.  "

## 2019-12-11 ENCOUNTER — HOSPITAL ENCOUNTER (OUTPATIENT)
Dept: RADIOLOGY | Facility: MEDICAL CENTER | Age: 79
End: 2019-12-11
Attending: INTERNAL MEDICINE
Payer: MEDICARE

## 2019-12-11 DIAGNOSIS — R05.9 COUGH: ICD-10-CM

## 2019-12-11 PROCEDURE — 71046 X-RAY EXAM CHEST 2 VIEWS: CPT

## 2019-12-16 ENCOUNTER — HOSPITAL ENCOUNTER (OUTPATIENT)
Dept: LAB | Facility: MEDICAL CENTER | Age: 79
End: 2019-12-16
Attending: INTERNAL MEDICINE
Payer: MEDICARE

## 2019-12-16 LAB
ALBUMIN SERPL BCP-MCNC: 3.6 G/DL (ref 3.2–4.9)
ALBUMIN/GLOB SERPL: 1.1 G/DL
ALP SERPL-CCNC: 63 U/L (ref 30–99)
ALT SERPL-CCNC: 20 U/L (ref 2–50)
ANION GAP SERPL CALC-SCNC: 12 MMOL/L (ref 0–11.9)
AST SERPL-CCNC: 22 U/L (ref 12–45)
BASOPHILS # BLD AUTO: 0.7 % (ref 0–1.8)
BASOPHILS # BLD: 0.05 K/UL (ref 0–0.12)
BILIRUB SERPL-MCNC: 0.4 MG/DL (ref 0.1–1.5)
BUN SERPL-MCNC: 16 MG/DL (ref 8–22)
CALCIUM SERPL-MCNC: 8.6 MG/DL (ref 8.5–10.5)
CHLORIDE SERPL-SCNC: 93 MMOL/L (ref 96–112)
CHOLEST SERPL-MCNC: 191 MG/DL (ref 100–199)
CO2 SERPL-SCNC: 30 MMOL/L (ref 20–33)
CREAT SERPL-MCNC: 0.7 MG/DL (ref 0.5–1.4)
EOSINOPHIL # BLD AUTO: 0.15 K/UL (ref 0–0.51)
EOSINOPHIL NFR BLD: 2 % (ref 0–6.9)
ERYTHROCYTE [DISTWIDTH] IN BLOOD BY AUTOMATED COUNT: 49.8 FL (ref 35.9–50)
ERYTHROCYTE [SEDIMENTATION RATE] IN BLOOD BY WESTERGREN METHOD: 28 MM/HOUR (ref 0–30)
GLOBULIN SER CALC-MCNC: 3.3 G/DL (ref 1.9–3.5)
GLUCOSE SERPL-MCNC: 84 MG/DL (ref 65–99)
HCT VFR BLD AUTO: 38 % (ref 37–47)
HDLC SERPL-MCNC: 59 MG/DL
HGB BLD-MCNC: 12.1 G/DL (ref 12–16)
IMM GRANULOCYTES # BLD AUTO: 0.02 K/UL (ref 0–0.11)
IMM GRANULOCYTES NFR BLD AUTO: 0.3 % (ref 0–0.9)
LDLC SERPL CALC-MCNC: 100 MG/DL
LYMPHOCYTES # BLD AUTO: 1.96 K/UL (ref 1–4.8)
LYMPHOCYTES NFR BLD: 26.3 % (ref 22–41)
MCH RBC QN AUTO: 29.2 PG (ref 27–33)
MCHC RBC AUTO-ENTMCNC: 31.8 G/DL (ref 33.6–35)
MCV RBC AUTO: 91.6 FL (ref 81.4–97.8)
MONOCYTES # BLD AUTO: 0.56 K/UL (ref 0–0.85)
MONOCYTES NFR BLD AUTO: 7.5 % (ref 0–13.4)
NEUTROPHILS # BLD AUTO: 4.72 K/UL (ref 2–7.15)
NEUTROPHILS NFR BLD: 63.2 % (ref 44–72)
NRBC # BLD AUTO: 0 K/UL
NRBC BLD-RTO: 0 /100 WBC
PLATELET # BLD AUTO: 412 K/UL (ref 164–446)
PMV BLD AUTO: 10.8 FL (ref 9–12.9)
POTASSIUM SERPL-SCNC: 3.2 MMOL/L (ref 3.6–5.5)
PROT SERPL-MCNC: 6.9 G/DL (ref 6–8.2)
RBC # BLD AUTO: 4.15 M/UL (ref 4.2–5.4)
SODIUM SERPL-SCNC: 135 MMOL/L (ref 135–145)
TRIGL SERPL-MCNC: 159 MG/DL (ref 0–149)
WBC # BLD AUTO: 7.5 K/UL (ref 4.8–10.8)

## 2019-12-16 PROCEDURE — 80061 LIPID PANEL: CPT

## 2019-12-16 PROCEDURE — 36415 COLL VENOUS BLD VENIPUNCTURE: CPT

## 2019-12-16 PROCEDURE — 80053 COMPREHEN METABOLIC PANEL: CPT

## 2019-12-16 PROCEDURE — 85025 COMPLETE CBC W/AUTO DIFF WBC: CPT

## 2019-12-16 PROCEDURE — 85652 RBC SED RATE AUTOMATED: CPT

## 2019-12-23 ENCOUNTER — APPOINTMENT (RX ONLY)
Dept: URBAN - METROPOLITAN AREA CLINIC 4 | Facility: CLINIC | Age: 79
Setting detail: DERMATOLOGY
End: 2019-12-23

## 2019-12-23 DIAGNOSIS — L57.0 ACTINIC KERATOSIS: ICD-10-CM

## 2019-12-23 DIAGNOSIS — L81.4 OTHER MELANIN HYPERPIGMENTATION: ICD-10-CM | Status: STABLE

## 2019-12-23 DIAGNOSIS — D22 MELANOCYTIC NEVI: ICD-10-CM

## 2019-12-23 DIAGNOSIS — L82.1 OTHER SEBORRHEIC KERATOSIS: ICD-10-CM

## 2019-12-23 DIAGNOSIS — L82.0 INFLAMED SEBORRHEIC KERATOSIS: ICD-10-CM

## 2019-12-23 PROBLEM — D22.5 MELANOCYTIC NEVI OF TRUNK: Status: ACTIVE | Noted: 2019-12-23

## 2019-12-23 PROCEDURE — ? DIAGNOSIS COMMENT

## 2019-12-23 PROCEDURE — 17110 DESTRUCTION B9 LES UP TO 14: CPT

## 2019-12-23 PROCEDURE — ? MEDICATION COUNSELING

## 2019-12-23 PROCEDURE — ? PRESCRIPTION

## 2019-12-23 PROCEDURE — ? OBSERVATION AND MEASURE

## 2019-12-23 PROCEDURE — 99213 OFFICE O/P EST LOW 20 MIN: CPT | Mod: 25

## 2019-12-23 PROCEDURE — ? LIQUID NITROGEN

## 2019-12-23 PROCEDURE — ? COUNSELING

## 2019-12-23 RX ORDER — FLUOROURACIL 5 MG/G
CREAM TOPICAL
Qty: 1 | Refills: 0 | Status: ERX | COMMUNITY
Start: 2019-12-23

## 2019-12-23 RX ADMIN — FLUOROURACIL 1: 5 CREAM TOPICAL at 00:00

## 2019-12-23 ASSESSMENT — LOCATION SIMPLE DESCRIPTION DERM
LOCATION SIMPLE: NOSE
LOCATION SIMPLE: LEFT ANTERIOR NECK
LOCATION SIMPLE: RIGHT PRETIBIAL REGION
LOCATION SIMPLE: UPPER BACK

## 2019-12-23 ASSESSMENT — LOCATION ZONE DERM
LOCATION ZONE: LEG
LOCATION ZONE: NECK
LOCATION ZONE: NOSE
LOCATION ZONE: TRUNK

## 2019-12-23 ASSESSMENT — LOCATION DETAILED DESCRIPTION DERM
LOCATION DETAILED: LEFT INFERIOR LATERAL NECK
LOCATION DETAILED: RIGHT PROXIMAL PRETIBIAL REGION
LOCATION DETAILED: NASAL TIP
LOCATION DETAILED: INFERIOR THORACIC SPINE

## 2019-12-23 NOTE — PROCEDURE: LIQUID NITROGEN
Detail Level: Detailed
Render Post-Care Instructions In Note?: no
Post-Care Instructions: I reviewed with the patient in detail post-care instructions. Patient is to wear sunprotection, and avoid picking at any of the treated lesions. Pt may apply Vaseline to crusted or scabbing areas.
Include Z78.9 (Other Specified Conditions Influencing Health Status) As An Associated Diagnosis?: Yes
Consent: The patient's consent was obtained including but not limited to risks of crusting, scabbing, blistering, scarring, darker or lighter pigmentary change, recurrence, incomplete removal and infection.
Medical Necessity Information: It is in your best interest to select a reason for this procedure from the list below. All of these items fulfill various CMS LCD requirements except the new and changing color options.
Medical Necessity Clause: This procedure was medically necessary because the lesions that were treated were:

## 2019-12-23 NOTE — PROCEDURE: COUNSELING
Detail Level: Zone
Patient Specific Counseling (Will Not Stick From Patient To Patient): Patient referred to Olga Lidia to discuss possible laser referral.
Detail Level: Detailed
Detail Level: Generalized
Patient Specific Counseling (Will Not Stick From Patient To Patient): Pt prefers 5FU

## 2019-12-23 NOTE — PROCEDURE: MEDICATION COUNSELING
Bactrim Counseling:  I discussed with the patient the risks of sulfa antibiotics including but not limited to GI upset, allergic reaction, drug rash, diarrhea, dizziness, photosensitivity, and yeast infections.  Rarely, more serious reactions can occur including but not limited to aplastic anemia, agranulocytosis, methemoglobinemia, blood dyscrasias, liver or kidney failure, lung infiltrates or desquamative/blistering drug rashes.
Tetracycline Pregnancy And Lactation Text: This medication is Pregnancy Category D and not consider safe during pregnancy. It is also excreted in breast milk.
Dapsone Counseling: I discussed with the patient the risks of dapsone including but not limited to hemolytic anemia, agranulocytosis, rashes, methemoglobinemia, kidney failure, peripheral neuropathy, headaches, GI upset, and liver toxicity.  Patients who start dapsone require monitoring including baseline LFTs and weekly CBCs for the first month, then every month thereafter.  The patient verbalized understanding of the proper use and possible adverse effects of dapsone.  All of the patient's questions and concerns were addressed.
Griseofulvin Pregnancy And Lactation Text: This medication is Pregnancy Category X and is known to cause serious birth defects. It is unknown if this medication is excreted in breast milk but breast feeding should be avoided.
Siliq Counseling:  I discussed with the patient the risks of Siliq including but not limited to new or worsening depression, suicidal thoughts and behavior, immunosuppression, malignancy, posterior leukoencephalopathy syndrome, and serious infections.  The patient understands that monitoring is required including a PPD at baseline and must alert us or the primary physician if symptoms of infection or other concerning signs are noted. There is also a special program designed to monitor depression which is required with Siliq.
Minoxidil Pregnancy And Lactation Text: This medication has not been assigned a Pregnancy Risk Category but animal studies failed to show danger with the topical medication. It is unknown if the medication is excreted in breast milk.
Otezla Pregnancy And Lactation Text: This medication is Pregnancy Category C and it isn't known if it is safe during pregnancy. It is unknown if it is excreted in breast milk.
Cellcept Counseling:  I discussed with the patient the risks of mycophenolate mofetil including but not limited to infection/immunosuppression, GI upset, hypokalemia, hypercholesterolemia, bone marrow suppression, lymphoproliferative disorders, malignancy, GI ulceration/bleed/perforation, colitis, interstitial lung disease, kidney failure, progressive multifocal leukoencephalopathy, and birth defects.  The patient understands that monitoring is required including a baseline creatinine and regular CBC testing. In addition, patient must alert us immediately if symptoms of infection or other concerning signs are noted.
Wartpeel Counseling:  I discussed with the patient the risks of Wartpeel including but not limited to erythema, scaling, itching, weeping, crusting, and pain.
Oxybutynin Counseling:  I discussed with the patient the risks of oxybutynin including but not limited to skin rash, drowsiness, dry mouth, difficulty urinating, and blurred vision.
Mirvaso Counseling: Mirvaso is a topical medication which can decrease superficial blood flow where applied. Side effects are uncommon and include stinging, redness and allergic reactions.
Wartpeel Pregnancy And Lactation Text: This medication is Pregnancy Category X and contraindicated in pregnancy and in women who may become pregnant. It is unknown if this medication is excreted in breast milk.
Cellcept Pregnancy And Lactation Text: This medication is Pregnancy Category D and isn't considered safe during pregnancy. It is unknown if this medication is excreted in breast milk.
Bactrim Pregnancy And Lactation Text: This medication is Pregnancy Category D and is known to cause fetal risk.  It is also excreted in breast milk.
Siliq Pregnancy And Lactation Text: The risk during pregnancy and breastfeeding is uncertain with this medication.
Fluconazole Counseling:  Patient counseled regarding adverse effects of fluconazole including but not limited to headache, diarrhea, nausea, upset stomach, liver function test abnormalities, taste disturbance, and stomach pain.  There is a rare possibility of liver failure that can occur when taking fluconazole.  The patient understands that monitoring of LFTs and kidney function test may be required, especially at baseline. The patient verbalized understanding of the proper use and possible adverse effects of fluconazole.  All of the patient's questions and concerns were addressed.
Itraconazole Counseling:  I discussed with the patient the risks of itraconazole including but not limited to liver damage, nausea/vomiting, neuropathy, and severe allergy.  The patient understands that this medication is best absorbed when taken with acidic beverages such as non-diet cola or ginger ale.  The patient understands that monitoring is required including baseline LFTs and repeat LFTs at intervals.  The patient understands that they are to contact us or the primary physician if concerning signs are noted.
Dapsone Pregnancy And Lactation Text: This medication is Pregnancy Category C and is not considered safe during pregnancy or breast feeding.
Zyclara Counseling:  I discussed with the patient the risks of imiquimod including but not limited to erythema, scaling, itching, weeping, crusting, and pain.  Patient understands that the inflammatory response to imiquimod is variable from person to person and was educated regarded proper titration schedule.  If flu-like symptoms develop, patient knows to discontinue the medication and contact us.
Simponi Counseling:  I discussed with the patient the risks of golimumab including but not limited to myelosuppression, immunosuppression, autoimmune hepatitis, demyelinating diseases, lymphoma, and serious infections.  The patient understands that monitoring is required including a PPD at baseline and must alert us or the primary physician if symptoms of infection or other concerning signs are noted.
Cyclophosphamide Counseling:  I discussed with the patient the risks of cyclophosphamide including but not limited to hair loss, hormonal abnormalities, decreased fertility, abdominal pain, diarrhea, nausea and vomiting, bone marrow suppression and infection. The patient understands that monitoring is required while taking this medication.
Benzoyl Peroxide Counseling: Patient counseled that medicine may cause skin irritation and bleach clothing.  In the event of skin irritation, the patient was advised to reduce the amount of the drug applied or use it less frequently.   The patient verbalized understanding of the proper use and possible adverse effects of benzoyl peroxide.  All of the patient's questions and concerns were addressed.
Erivedge Counseling- I discussed with the patient the risks of Erivedge including but not limited to nausea, vomiting, diarrhea, constipation, weight loss, changes in the sense of taste, decreased appetite, muscle spasms, and hair loss.  The patient verbalized understanding of the proper use and possible adverse effects of Erivedge.  All of the patient's questions and concerns were addressed.
Mirvaso Pregnancy And Lactation Text: This medication has not been assigned a Pregnancy Risk Category. It is unknown if the medication is excreted in breast milk.
Itraconazole Pregnancy And Lactation Text: This medication is Pregnancy Category C and it isn't know if it is safe during pregnancy. It is also excreted in breast milk.
Cephalexin Counseling: I counseled the patient regarding use of cephalexin as an antibiotic for prophylactic and/or therapeutic purposes. Cephalexin (commonly prescribed under brand name Keflex) is a cephalosporin antibiotic which is active against numerous classes of bacteria, including most skin bacteria. Side effects may include nausea, diarrhea, gastrointestinal upset, rash, hives, yeast infections, and in rare cases, hepatitis, kidney disease, seizures, fever, confusion, neurologic symptoms, and others. Patients with severe allergies to penicillin medications are cautioned that there is about a 10% incidence of cross-reactivity with cephalosporins. When possible, patients with penicillin allergies should use alternatives to cephalosporins for antibiotic therapy.
Oxybutynin Pregnancy And Lactation Text: This medication is Pregnancy Category B and is considered safe during pregnancy. It is unknown if it is excreted in breast milk.
Cephalexin Pregnancy And Lactation Text: This medication is Pregnancy Category B and considered safe during pregnancy.  It is also excreted in breast milk but can be used safely for shorter doses.
Cimzia Counseling:  I discussed with the patient the risks of Cimzia including but not limited to immunosuppression, allergic reactions and infections.  The patient understands that monitoring is required including a PPD at baseline and must alert us or the primary physician if symptoms of infection or other concerning signs are noted.
Benzoyl Peroxide Pregnancy And Lactation Text: This medication is Pregnancy Category C. It is unknown if benzoyl peroxide is excreted in breast milk.
Ketoconazole Counseling:   Patient counseled regarding improving absorption with orange juice.  Adverse effects include but are not limited to breast enlargement, headache, diarrhea, nausea, upset stomach, liver function test abnormalities, taste disturbance, and stomach pain.  There is a rare possibility of liver failure that can occur when taking ketoconazole. The patient understands that monitoring of LFTs may be required, especially at baseline. The patient verbalized understanding of the proper use and possible adverse effects of ketoconazole.  All of the patient's questions and concerns were addressed.
Erivedge Pregnancy And Lactation Text: This medication is Pregnancy Category X and is absolutely contraindicated during pregnancy. It is unknown if it is excreted in breast milk.
Propranolol Counseling:  I discussed with the patient the risks of propranolol including but not limited to low heart rate, low blood pressure, low blood sugar, restlessness and increased cold sensitivity. They should call the office if they experience any of these side effects.
Picato Counseling:  I discussed with the patient the risks of Picato including but not limited to erythema, scaling, itching, weeping, crusting, and pain.
Cyclophosphamide Pregnancy And Lactation Text: This medication is Pregnancy Category D and it isn't considered safe during pregnancy. This medication is excreted in breast milk.
Zyclara Pregnancy And Lactation Text: This medication is Pregnancy Category C. It is unknown if this medication is excreted in breast milk.
Cimzia Pregnancy And Lactation Text: This medication crosses the placenta but can be considered safe in certain situations. Cimzia may be excreted in breast milk.
Cyclosporine Counseling:  I discussed with the patient the risks of cyclosporine including but not limited to hypertension, gingival hyperplasia,myelosuppression, immunosuppression, liver damage, kidney damage, neurotoxicity, lymphoma, and serious infections. The patient understands that monitoring is required including baseline blood pressure, CBC, CMP, lipid panel and uric acid, and then 1-2 times monthly CMP and blood pressure.
Ketoconazole Pregnancy And Lactation Text: This medication is Pregnancy Category C and it isn't know if it is safe during pregnancy. It is also excreted in breast milk and breast feeding isn't recommended.
Clindamycin Counseling: I counseled the patient regarding use of clindamycin as an antibiotic for prophylactic and/or therapeutic purposes. Clindamycin is active against numerous classes of bacteria, including skin bacteria. Side effects may include nausea, diarrhea, gastrointestinal upset, rash, hives, yeast infections, and in rare cases, colitis.
Skyrizi Counseling: I discussed with the patient the risks of risankizumab-rzaa including but not limited to immunosuppression, and serious infections.  The patient understands that monitoring is required including a PPD at baseline and must alert us or the primary physician if symptoms of infection or other concerning signs are noted.
Finasteride Male Counseling: Finasteride Counseling:  I discussed with the patient the risks of use of finasteride including but not limited to decreased libido, decreased ejaculate volume, gynecomastia, and depression. Women should not handle medication.  All of the patient's questions and concerns were addressed.
Carac Counseling:  I discussed with the patient the risks of Carac including but not limited to erythema, scaling, itching, weeping, crusting, and pain.
Propranolol Pregnancy And Lactation Text: This medication is Pregnancy Category C and it isn't known if it is safe during pregnancy. It is excreted in breast milk.
Opioid Counseling: I discussed with the patient the potential side effects of opioids including but not limited to addiction, altered mental status, and depression. I stressed avoiding alcohol, benzodiazepines, muscle relaxants and sleep aids unless specifically okayed by a physician. The patient verbalized understanding of the proper use and possible adverse effects of opioids. All of the patient's questions and concerns were addressed. They were instructed to flush the remaining pills down the toilet if they did not need them for pain.
Cyclosporine Pregnancy And Lactation Text: This medication is Pregnancy Category C and it isn't know if it is safe during pregnancy. This medication is excreted in breast milk.
Cosentyx Counseling:  I discussed with the patient the risks of Cosentyx including but not limited to worsening of Crohn's disease, immunosuppression, allergic reactions and infections.  The patient understands that monitoring is required including a PPD at baseline and must alert us or the primary physician if symptoms of infection or other concerning signs are noted.
Finasteride Pregnancy And Lactation Text: This medication is absolutely contraindicated during pregnancy. It is unknown if it is excreted in breast milk.
Clindamycin Pregnancy And Lactation Text: This medication can be used in pregnancy if certain situations. Clindamycin is also present in breast milk.
Terbinafine Counseling: Patient counseling regarding adverse effects of terbinafine including but not limited to headache, diarrhea, rash, upset stomach, liver function test abnormalities, itching, taste/smell disturbance, nausea, abdominal pain, and flatulence.  There is a rare possibility of liver failure that can occur when taking terbinafine.  The patient understands that a baseline LFT and kidney function test may be required. The patient verbalized understanding of the proper use and possible adverse effects of terbinafine.  All of the patient's questions and concerns were addressed.
Doxycycline Counseling:  Patient counseled regarding possible photosensitivity and increased risk for sunburn.  Patient instructed to avoid sunlight, if possible.  When exposed to sunlight, patients should wear protective clothing, sunglasses, and sunscreen.  The patient was instructed to call the office immediately if the following severe adverse effects occur:  hearing changes, easy bruising/bleeding, severe headache, or vision changes.  The patient verbalized understanding of the proper use and possible adverse effects of doxycycline.  All of the patient's questions and concerns were addressed.
Protopic Counseling: Patient may experience a mild burning sensation during topical application. Protopic is not approved in children less than 2 years of age. There have been case reports of hematologic and skin malignancies in patients using topical calcineurin inhibitors although causality is questionable.
Birth Control Pills Counseling: Birth Control Pill Counseling: I discussed with the patient the potential side effects of OCPs including but not limited to increased risk of stroke, heart attack, thrombophlebitis, deep venous thrombosis, hepatic adenomas, breast changes, GI upset, headaches, and depression.  The patient verbalized understanding of the proper use and possible adverse effects of OCPs. All of the patient's questions and concerns were addressed.
5-Fu Counseling: 5-Fluorouracil Counseling:  I discussed with the patient the risks of 5-fluorouracil including but not limited to erythema, scaling, itching, weeping, crusting, and pain.
Birth Control Pills Pregnancy And Lactation Text: This medication should be avoided if pregnant and for the first 30 days post-partum.
Terbinafine Pregnancy And Lactation Text: This medication is Pregnancy Category B and is considered safe during pregnancy. It is also excreted in breast milk and breast feeding isn't recommended.
Protopic Pregnancy And Lactation Text: This medication is Pregnancy Category C. It is unknown if this medication is excreted in breast milk when applied topically.
Stelara Counseling:  I discussed with the patient the risks of ustekinumab including but not limited to immunosuppression, malignancy, posterior leukoencephalopathy syndrome, and serious infections.  The patient understands that monitoring is required including a PPD at baseline and must alert us or the primary physician if symptoms of infection or other concerning signs are noted.
Opioid Pregnancy And Lactation Text: These medications can lead to premature delivery and should be avoided during pregnancy. These medications are also present in breast milk in small amounts.
Methotrexate Counseling:  Patient counseled regarding adverse effects of methotrexate including but not limited to nausea, vomiting, abnormalities in liver function tests. Patients may develop mouth sores, rash, diarrhea, and abnormalities in blood counts. The patient understands that monitoring is required including LFT's and blood counts.  There is a rare possibility of scarring of the liver and lung problems that can occur when taking methotrexate. Persistent nausea, loss of appetite, pale stools, dark urine, cough, and shortness of breath should be reported immediately. Patient advised to discontinue methotrexate treatment at least three months before attempting to become pregnant.  I discussed the need for folate supplements while taking methotrexate.  These supplements can decrease side effects during methotrexate treatment. The patient verbalized understanding of the proper use and possible adverse effects of methotrexate.  All of the patient's questions and concerns were addressed.
Gabapentin Counseling: I discussed with the patient the risks of gabapentin including but not limited to dizziness, somnolence, fatigue and ataxia.
Cosentyx Pregnancy And Lactation Text: This medication is Pregnancy Category B and is considered safe during pregnancy. It is unknown if this medication is excreted in breast milk.
Methotrexate Pregnancy And Lactation Text: This medication is Pregnancy Category X and is known to cause fetal harm. This medication is excreted in breast milk.
Doxycycline Pregnancy And Lactation Text: This medication is Pregnancy Category D and not consider safe during pregnancy. It is also excreted in breast milk but is considered safe for shorter treatment courses.
Dupixent Counseling: I discussed with the patient the risks of dupilumab including but not limited to eye infection and irritation, cold sores, injection site reactions, worsening of asthma, allergic reactions and increased risk of parasitic infection.  Live vaccines should be avoided while taking dupilumab. Dupilumab will also interact with certain medications such as warfarin and cyclosporine. The patient understands that monitoring is required and they must alert us or the primary physician if symptoms of infection or other concerning signs are noted.
Gabapentin Pregnancy And Lactation Text: This medication is Pregnancy Category C and isn't considered safe during pregnancy. It is excreted in breast milk.
Spironolactone Counseling: Patient advised regarding risks of diarrhea, abdominal pain, hyperkalemia, birth defects (for female patients), liver toxicity and renal toxicity. The patient may need blood work to monitor liver and kidney function and potassium levels while on therapy. The patient verbalized understanding of the proper use and possible adverse effects of spironolactone.  All of the patient's questions and concerns were addressed.
Rhofade Counseling: Rhofade is a topical medication which can decrease superficial blood flow where applied. Side effects are uncommon and include stinging, redness and allergic reactions.
Arava Counseling:  Patient counseled regarding adverse effects of Arava including but not limited to nausea, vomiting, abnormalities in liver function tests. Patients may develop mouth sores, rash, diarrhea, and abnormalities in blood counts. The patient understands that monitoring is required including LFTs and blood counts.  There is a rare possibility of scarring of the liver and lung problems that can occur when taking methotrexate. Persistent nausea, loss of appetite, pale stools, dark urine, cough, and shortness of breath should be reported immediately. Patient advised to discontinue Arava treatment and consult with a physician prior to attempting conception. The patient will have to undergo a treatment to eliminate Arava from the body prior to conception.
Prednisone Counseling:  I discussed with the patient the risks of prolonged use of prednisone including but not limited to weight gain, insomnia, osteoporosis, mood changes, diabetes, susceptibility to infection, glaucoma and high blood pressure.  In cases where prednisone use is prolonged, patients should be monitored with blood pressure checks, serum glucose levels and an eye exam.  Additionally, the patient may need to be placed on GI prophylaxis, PCP prophylaxis, and calcium and vitamin D supplementation and/or a bisphosphonate.  The patient verbalized understanding of the proper use and the possible adverse effects of prednisone.  All of the patient's questions and concerns were addressed.
Drysol Counseling:  I discussed with the patient the risks of drysol/aluminum chloride including but not limited to skin rash, itching, irritation, burning.
Glycopyrrolate Counseling:  I discussed with the patient the risks of glycopyrrolate including but not limited to skin rash, drowsiness, dry mouth, difficulty urinating, and blurred vision.
Dupixent Pregnancy And Lactation Text: This medication likely crosses the placenta but the risk for the fetus is uncertain. This medication is excreted in breast milk.
Taltz Counseling: I discussed with the patient the risks of ixekizumab including but not limited to immunosuppression, serious infections, worsening of inflammatory bowel disease and drug reactions.  The patient understands that monitoring is required including a PPD at baseline and must alert us or the primary physician if symptoms of infection or other concerning signs are noted.
Erythromycin Counseling:  I discussed with the patient the risks of erythromycin including but not limited to GI upset, allergic reaction, drug rash, diarrhea, increase in liver enzymes, and yeast infections.
Cimetidine Counseling:  I discussed with the patient the risks of Cimetidine including but not limited to gynecomastia, headache, diarrhea, nausea, drowsiness, arrhythmias, pancreatitis, skin rashes, psychosis, bone marrow suppression and kidney toxicity.
Erythromycin Pregnancy And Lactation Text: This medication is Pregnancy Category B and is considered safe during pregnancy. It is also excreted in breast milk.
Spironolactone Pregnancy And Lactation Text: This medication can cause feminization of the male fetus and should be avoided during pregnancy. The active metabolite is also found in breast milk.
SSKI Counseling:  I discussed with the patient the risks of SSKI including but not limited to thyroid abnormalities, metallic taste, GI upset, fever, headache, acne, arthralgias, paraesthesias, lymphadenopathy, easy bleeding, arrhythmias, and allergic reaction.
Solaraze Counseling:  I discussed with the patient the risks of Solaraze including but not limited to erythema, scaling, itching, weeping, crusting, and pain.
Glycopyrrolate Pregnancy And Lactation Text: This medication is Pregnancy Category B and is considered safe during pregnancy. It is unknown if it is excreted breast milk.
Drysol Pregnancy And Lactation Text: This medication is considered safe during pregnancy and breast feeding.
Enbrel Counseling:  I discussed with the patient the risks of etanercept including but not limited to myelosuppression, immunosuppression, autoimmune hepatitis, demyelinating diseases, lymphoma, and infections.  The patient understands that monitoring is required including a PPD at baseline and must alert us or the primary physician if symptoms of infection or other concerning signs are noted.
Tremfya Counseling: I discussed with the patient the risks of guselkumab including but not limited to immunosuppression, serious infections, worsening of inflammatory bowel disease and drug reactions.  The patient understands that monitoring is required including a PPD at baseline and must alert us or the primary physician if symptoms of infection or other concerning signs are noted.
Clofazimine Counseling:  I discussed with the patient the risks of clofazimine including but not limited to skin and eye pigmentation, liver damage, nausea/vomiting, gastrointestinal bleeding and allergy.
Doxepin Counseling:  Patient advised that the medication is sedating and not to drive a car after taking this medication. Patient informed of potential adverse effects including but not limited to dry mouth, urinary retention, and blurry vision.  The patient verbalized understanding of the proper use and possible adverse effects of doxepin.  All of the patient's questions and concerns were addressed.
Metronidazole Counseling:  I discussed with the patient the risks of metronidazole including but not limited to seizures, nausea/vomiting, a metallic taste in the mouth, nausea/vomiting and severe allergy.
Detail Level: Simple
Elidel Counseling: Patient may experience a mild burning sensation during topical application. Elidel is not approved in children less than 2 years of age. There have been case reports of hematologic and skin malignancies in patients using topical calcineurin inhibitors although causality is questionable.
Hydroxychloroquine Counseling:  I discussed with the patient that a baseline ophthalmologic exam is needed at the start of therapy and every year thereafter while on therapy. A CBC may also be warranted for monitoring.  The side effects of this medication were discussed with the patient, including but not limited to agranulocytosis, aplastic anemia, seizures, rashes, retinopathy, and liver toxicity. Patient instructed to call the office should any adverse effect occur.  The patient verbalized understanding of the proper use and possible adverse effects of Plaquenil.  All the patient's questions and concerns were addressed.
Solaraze Pregnancy And Lactation Text: This medication is Pregnancy Category B and is considered safe. There is some data to suggest avoiding during the third trimester. It is unknown if this medication is excreted in breast milk.
Sski Pregnancy And Lactation Text: This medication is Pregnancy Category D and isn't considered safe during pregnancy. It is excreted in breast milk.
Acitretin Counseling:  I discussed with the patient the risks of acitretin including but not limited to hair loss, dry lips/skin/eyes, liver damage, hyperlipidemia, depression/suicidal ideation, photosensitivity.  Serious rare side effects can include but are not limited to pancreatitis, pseudotumor cerebri, bony changes, clot formation/stroke/heart attack.  Patient understands that alcohol is contraindicated since it can result in liver toxicity and significantly prolong the elimination of the drug by many years.
Hydroxychloroquine Pregnancy And Lactation Text: This medication has been shown to cause fetal harm but it isn't assigned a Pregnancy Risk Category. There are small amounts excreted in breast milk.
Humira Counseling:  I discussed with the patient the risks of adalimumab including but not limited to myelosuppression, immunosuppression, autoimmune hepatitis, demyelinating diseases, lymphoma, and serious infections.  The patient understands that monitoring is required including a PPD at baseline and must alert us or the primary physician if symptoms of infection or other concerning signs are noted.
Metronidazole Pregnancy And Lactation Text: This medication is Pregnancy Category B and considered safe during pregnancy.  It is also excreted in breast milk.
Doxepin Pregnancy And Lactation Text: This medication is Pregnancy Category C and it isn't known if it is safe during pregnancy. It is also excreted in breast milk and breast feeding isn't recommended.
Xeljanz Counseling: I discussed with the patient the risks of Xeljanz therapy including increased risk of infection, liver issues, headache, diarrhea, or cold symptoms. Live vaccines should be avoided. They were instructed to call if they have any problems.
Minocycline Counseling: Patient advised regarding possible photosensitivity and discoloration of the teeth, skin, lips, tongue and gums.  Patient instructed to avoid sunlight, if possible.  When exposed to sunlight, patients should wear protective clothing, sunglasses, and sunscreen.  The patient was instructed to call the office immediately if the following severe adverse effects occur:  hearing changes, easy bruising/bleeding, severe headache, or vision changes.  The patient verbalized understanding of the proper use and possible adverse effects of minocycline.  All of the patient's questions and concerns were addressed.
Topical Retinoid counseling:  Patient advised to apply a pea-sized amount only at bedtime and wait 30 minutes after washing their face before applying.  If too drying, patient may add a non-comedogenic moisturizer. The patient verbalized understanding of the proper use and possible adverse effects of retinoids.  All of the patient's questions and concerns were addressed.
Thalidomide Counseling: I discussed with the patient the risks of thalidomide including but not limited to birth defects, anxiety, weakness, chest pain, dizziness, cough and severe allergy.
Hydroxyzine Counseling: Patient advised that the medication is sedating and not to drive a car after taking this medication.  Patient informed of potential adverse effects including but not limited to dry mouth, urinary retention, and blurry vision.  The patient verbalized understanding of the proper use and possible adverse effects of hydroxyzine.  All of the patient's questions and concerns were addressed.
Colchicine Counseling:  Patient counseled regarding adverse effects including but not limited to stomach upset (nausea, vomiting, stomach pain, or diarrhea).  Patient instructed to limit alcohol consumption while taking this medication.  Colchicine may reduce blood counts especially with prolonged use.  The patient understands that monitoring of kidney function and blood counts may be required, especially at baseline. The patient verbalized understanding of the proper use and possible adverse effects of colchicine.  All of the patient's questions and concerns were addressed.
Acitretin Pregnancy And Lactation Text: This medication is Pregnancy Category X and should not be given to women who are pregnant or may become pregnant in the future. This medication is excreted in breast milk.
Niacinamide Counseling: I recommended taking niacin or niacinamide, also know as vitamin B3, twice daily. Recent evidence suggests that taking vitamin B3 (500 mg twice daily) can reduce the risk of actinic keratoses and non-melanoma skin cancers. Side effects of vitamin B3 include flushing and headache.
Eucrisa Counseling: Patient may experience a mild burning sensation during topical application. Eucrisa is not approved in children less than 2 years of age.
Bexarotene Counseling:  I discussed with the patient the risks of bexarotene including but not limited to hair loss, dry lips/skin/eyes, liver abnormalities, hyperlipidemia, pancreatitis, depression/suicidal ideation, photosensitivity, drug rash/allergic reactions, hypothyroidism, anemia, leukopenia, infection, cataracts, and teratogenicity.  Patient understands that they will need regular blood tests to check lipid profile, liver function tests, white blood cell count, thyroid function tests and pregnancy test if applicable.
Hydroxyzine Pregnancy And Lactation Text: This medication is not safe during pregnancy and should not be taken. It is also excreted in breast milk and breast feeding isn't recommended.
Include Pregnancy/Lactation Warning?: No
Ilumya Counseling: I discussed with the patient the risks of tildrakizumab including but not limited to immunosuppression, malignancy, posterior leukoencephalopathy syndrome, and serious infections.  The patient understands that monitoring is required including a PPD at baseline and must alert us or the primary physician if symptoms of infection or other concerning signs are noted.
Niacinamide Pregnancy And Lactation Text: These medications are considered safe during pregnancy.
Tazorac Counseling:  Patient advised that medication is irritating and drying.  Patient may need to apply sparingly and wash off after an hour before eventually leaving it on overnight.  The patient verbalized understanding of the proper use and possible adverse effects of tazorac.  All of the patient's questions and concerns were addressed.
Xeltheaz Pregnancy And Lactation Text: This medication is Pregnancy Category D and is not considered safe during pregnancy.  The risk during breast feeding is also uncertain.
Tranexamic Acid Counseling:  Patient advised of the small risk of bleeding problems with tranexamic acid. They were also instructed to call if they developed any nausea, vomiting or diarrhea. All of the patient's questions and concerns were addressed.
Bexarotene Pregnancy And Lactation Text: This medication is Pregnancy Category X and should not be given to women who are pregnant or may become pregnant. This medication should not be used if you are breast feeding.
Nsaids Counseling: NSAID Counseling: I discussed with the patient that NSAIDs should be taken with food. Prolonged use of NSAIDs can result in the development of stomach ulcers.  Patient advised to stop taking NSAIDs if abdominal pain occurs.  The patient verbalized understanding of the proper use and possible adverse effects of NSAIDs.  All of the patient's questions and concerns were addressed.
Hydroquinone Counseling:  Patient advised that medication may result in skin irritation, lightening (hypopigmentation), dryness, and burning.  In the event of skin irritation, the patient was advised to reduce the amount of the drug applied or use it less frequently.  Rarely, spots that are treated with hydroquinone can become darker (pseudoochronosis).  Should this occur, patient instructed to stop medication and call the office. The patient verbalized understanding of the proper use and possible adverse effects of hydroquinone.  All of the patient's questions and concerns were addressed.
Tranexamic Acid Pregnancy And Lactation Text: It is unknown if this medication is safe during pregnancy or breast feeding.
Quinolones Counseling:  I discussed with the patient the risks of fluoroquinolones including but not limited to GI upset, allergic reaction, drug rash, diarrhea, dizziness, photosensitivity, yeast infections, liver function test abnormalities, tendonitis/tendon rupture.
Xolair Counseling:  Patient informed of potential adverse effects including but not limited to fever, muscle aches, rash and allergic reactions.  The patient verbalized understanding of the proper use and possible adverse effects of Xolair.  All of the patient's questions and concerns were addressed.
Tazorac Pregnancy And Lactation Text: This medication is not safe during pregnancy. It is unknown if this medication is excreted in breast milk.
Infliximab Counseling:  I discussed with the patient the risks of infliximab including but not limited to myelosuppression, immunosuppression, autoimmune hepatitis, demyelinating diseases, lymphoma, and serious infections.  The patient understands that monitoring is required including a PPD at baseline and must alert us or the primary physician if symptoms of infection or other concerning signs are noted.
Valtrex Counseling: I discussed with the patient the risks of valacyclovir including but not limited to kidney damage, nausea, vomiting and severe allergy.  The patient understands that if the infection seems to be worsening or is not improving, they are to call.
Xolair Pregnancy And Lactation Text: This medication is Pregnancy Category B and is considered safe during pregnancy. This medication is excreted in breast milk.
Topical Clindamycin Counseling: Patient counseled that this medication may cause skin irritation or allergic reactions.  In the event of skin irritation, the patient was advised to reduce the amount of the drug applied or use it less frequently.   The patient verbalized understanding of the proper use and possible adverse effects of clindamycin.  All of the patient's questions and concerns were addressed.
Albendazole Counseling:  I discussed with the patient the risks of albendazole including but not limited to cytopenia, kidney damage, nausea/vomiting and severe allergy.  The patient understands that this medication is being used in an off-label manner.
Isotretinoin Counseling: Patient should get monthly blood tests, not donate blood, not drive at night if vision affected, not share medication, and not undergo elective surgery for 6 months after tx completed. Side effects reviewed, pt to contact office should one occur.
Nsaids Pregnancy And Lactation Text: These medications are considered safe up to 30 weeks gestation. It is excreted in breast milk.
Isotretinoin Pregnancy And Lactation Text: This medication is Pregnancy Category X and is considered extremely dangerous during pregnancy. It is unknown if it is excreted in breast milk.
Rifampin Counseling: I discussed with the patient the risks of rifampin including but not limited to liver damage, kidney damage, red-orange body fluids, nausea/vomiting and severe allergy.
Albendazole Pregnancy And Lactation Text: This medication is Pregnancy Category C and it isn't known if it is safe during pregnancy. It is also excreted in breast milk.
Rifampin Pregnancy And Lactation Text: This medication is Pregnancy Category C and it isn't know if it is safe during pregnancy. It is also excreted in breast milk and should not be used if you are breast feeding.
Imiquimod Counseling:  I discussed with the patient the risks of imiquimod including but not limited to erythema, scaling, itching, weeping, crusting, and pain.  Patient understands that the inflammatory response to imiquimod is variable from person to person and was educated regarded proper titration schedule.  If flu-like symptoms develop, patient knows to discontinue the medication and contact us.
Odomzo Counseling- I discussed with the patient the risks of Odomzo including but not limited to nausea, vomiting, diarrhea, constipation, weight loss, changes in the sense of taste, decreased appetite, muscle spasms, and hair loss.  The patient verbalized understanding of the proper use and possible adverse effects of Odomzo.  All of the patient's questions and concerns were addressed.
Valtrex Pregnancy And Lactation Text: this medication is Pregnancy Category B and is considered safe during pregnancy. This medication is not directly found in breast milk but it's metabolite acyclovir is present.
Azithromycin Counseling:  I discussed with the patient the risks of azithromycin including but not limited to GI upset, allergic reaction, drug rash, diarrhea, and yeast infections.
Topical Sulfur Applications Counseling: Topical Sulfur Counseling: Patient counseled that this medication may cause skin irritation or allergic reactions.  In the event of skin irritation, the patient was advised to reduce the amount of the drug applied or use it less frequently.   The patient verbalized understanding of the proper use and possible adverse effects of topical sulfur application.  All of the patient's questions and concerns were addressed.
Rituxan Counseling:  I discussed with the patient the risks of Rituxan infusions. Side effects can include infusion reactions, severe drug rashes including mucocutaneous reactions, reactivation of latent hepatitis and other infections and rarely progressive multifocal leukoencephalopathy.  All of the patient's questions and concerns were addressed.
Ivermectin Counseling:  Patient instructed to take medication on an empty stomach with a full glass of water.  Patient informed of potential adverse effects including but not limited to nausea, diarrhea, dizziness, itching, and swelling of the extremities or lymph nodes.  The patient verbalized understanding of the proper use and possible adverse effects of ivermectin.  All of the patient's questions and concerns were addressed.
High Dose Vitamin A Counseling: Side effects reviewed, pt to contact office should one occur.
Tetracycline Counseling: Patient counseled regarding possible photosensitivity and increased risk for sunburn.  Patient instructed to avoid sunlight, if possible.  When exposed to sunlight, patients should wear protective clothing, sunglasses, and sunscreen.  The patient was instructed to call the office immediately if the following severe adverse effects occur:  hearing changes, easy bruising/bleeding, severe headache, or vision changes.  The patient verbalized understanding of the proper use and possible adverse effects of tetracycline.  All of the patient's questions and concerns were addressed. Patient understands to avoid pregnancy while on therapy due to potential birth defects.
Griseofulvin Counseling:  I discussed with the patient the risks of griseofulvin including but not limited to photosensitivity, cytopenia, liver damage, nausea/vomiting and severe allergy.  The patient understands that this medication is best absorbed when taken with a fatty meal (e.g., ice cream or french fries).
Azathioprine Counseling:  I discussed with the patient the risks of azathioprine including but not limited to myelosuppression, immunosuppression, hepatotoxicity, lymphoma, and infections.  The patient understands that monitoring is required including baseline LFTs, Creatinine, possible TPMP genotyping and weekly CBCs for the first month and then every 2 weeks thereafter.  The patient verbalized understanding of the proper use and possible adverse effects of azathioprine.  All of the patient's questions and concerns were addressed.
Rituxan Pregnancy And Lactation Text: This medication is Pregnancy Category C and it isn't know if it is safe during pregnancy. It is unknown if this medication is excreted in breast milk but similar antibodies are known to be excreted.
Topical Sulfur Applications Pregnancy And Lactation Text: This medication is Pregnancy Category C and has an unknown safety profile during pregnancy. It is unknown if this topical medication is excreted in breast milk.
High Dose Vitamin A Pregnancy And Lactation Text: High dose vitamin A therapy is contraindicated during pregnancy and breast feeding.
Azithromycin Pregnancy And Lactation Text: This medication is considered safe during pregnancy and is also secreted in breast milk.
Minoxidil Counseling: Minoxidil is a topical medication which can increase blood flow where it is applied. It is uncertain how this medication increases hair growth. Side effects are uncommon and include stinging and allergic reactions.
Otezla Counseling: The side effects of Otezla were discussed with the patient, including but not limited to worsening or new depression, weight loss, diarrhea, nausea, upper respiratory tract infection, and headache. Patient instructed to call the office should any adverse effect occur.  The patient verbalized understanding of the proper use and possible adverse effects of Otezla.  All the patient's questions and concerns were addressed.

## 2020-01-25 DIAGNOSIS — I10 ESSENTIAL HYPERTENSION: ICD-10-CM

## 2020-01-27 RX ORDER — CARVEDILOL 12.5 MG/1
12.5 TABLET ORAL 2 TIMES DAILY WITH MEALS
Qty: 180 TAB | Refills: 3 | Status: SHIPPED | OUTPATIENT
Start: 2020-01-27 | End: 2020-12-09

## 2020-03-02 ENCOUNTER — APPOINTMENT (RX ONLY)
Dept: URBAN - METROPOLITAN AREA CLINIC 4 | Facility: CLINIC | Age: 80
Setting detail: DERMATOLOGY
End: 2020-03-02

## 2020-03-02 DIAGNOSIS — L57.0 ACTINIC KERATOSIS: ICD-10-CM

## 2020-03-02 DIAGNOSIS — L82.1 OTHER SEBORRHEIC KERATOSIS: ICD-10-CM

## 2020-03-02 PROCEDURE — ? ADDITIONAL NOTES

## 2020-03-02 PROCEDURE — ? COUNSELING

## 2020-03-02 PROCEDURE — 99213 OFFICE O/P EST LOW 20 MIN: CPT

## 2020-03-02 ASSESSMENT — LOCATION SIMPLE DESCRIPTION DERM
LOCATION SIMPLE: LEFT CHEEK
LOCATION SIMPLE: LEFT LIP
LOCATION SIMPLE: LEFT TEMPLE

## 2020-03-02 ASSESSMENT — LOCATION ZONE DERM
LOCATION ZONE: LIP
LOCATION ZONE: FACE

## 2020-03-02 ASSESSMENT — LOCATION DETAILED DESCRIPTION DERM
LOCATION DETAILED: LEFT SUPERIOR CENTRAL MALAR CHEEK
LOCATION DETAILED: LEFT CENTRAL TEMPLE
LOCATION DETAILED: LEFT LATERAL MANDIBULAR CHEEK
LOCATION DETAILED: LEFT UPPER CUTANEOUS LIP

## 2020-03-02 NOTE — PROCEDURE: COUNSELING
Patient Specific Counseling (Will Not Stick From Patient To Patient): Will use the 5-FU on temple and left cutaneous upper lip , left upper forehead.
Detail Level: Detailed

## 2020-03-02 NOTE — PROCEDURE: ADDITIONAL NOTES
Detail Level: Detailed
Additional Notes: Nasal supratip lesion fading; some inflammation persists on the inferior tip, which arose during the treatment.  Will observe this area and consider treating it if it persists.

## 2020-03-08 DIAGNOSIS — E87.6 HYPOKALEMIA: ICD-10-CM

## 2020-03-08 DIAGNOSIS — I10 ESSENTIAL HYPERTENSION, BENIGN: ICD-10-CM

## 2020-03-09 RX ORDER — POTASSIUM CHLORIDE 600 MG/1
TABLET, FILM COATED, EXTENDED RELEASE ORAL
Qty: 90 TAB | Refills: 0 | Status: SHIPPED | OUTPATIENT
Start: 2020-03-09

## 2020-05-20 ENCOUNTER — TELEPHONE (OUTPATIENT)
Dept: CARDIOLOGY | Facility: MEDICAL CENTER | Age: 80
End: 2020-05-20

## 2020-05-20 NOTE — TELEPHONE ENCOUNTER
JESSICA    Pt has an appt on 5/26, she's asking if she should do virtual or come into the office, advised it was her choice but she wants advice from you & RS.

## 2020-05-21 NOTE — TELEPHONE ENCOUNTER
JESSICA/burak    Pt calling to provide another ph# [phone] (cell#) instead of # she left yesterday.

## 2020-05-22 NOTE — TELEPHONE ENCOUNTER
Spoke with pt. She is feeling fine and has been literally sheltering at home.   Since she is feeling well, rescheduled her FV to July 2nd when she can come to office with fewer reservations.

## 2020-05-22 NOTE — TELEPHONE ENCOUNTER
I spoke with Danica and advised her that I will call her tomorrow afternoon re: whether I hear from RS.

## 2020-07-02 ENCOUNTER — OFFICE VISIT (OUTPATIENT)
Dept: CARDIOLOGY | Facility: MEDICAL CENTER | Age: 80
End: 2020-07-02
Payer: MEDICARE

## 2020-07-02 VITALS
OXYGEN SATURATION: 94 % | HEIGHT: 63 IN | SYSTOLIC BLOOD PRESSURE: 134 MMHG | HEART RATE: 80 BPM | WEIGHT: 153 LBS | DIASTOLIC BLOOD PRESSURE: 70 MMHG | BODY MASS INDEX: 27.11 KG/M2

## 2020-07-02 DIAGNOSIS — I10 ESSENTIAL HYPERTENSION, BENIGN: ICD-10-CM

## 2020-07-02 PROCEDURE — 99213 OFFICE O/P EST LOW 20 MIN: CPT | Performed by: INTERNAL MEDICINE

## 2020-07-02 ASSESSMENT — ENCOUNTER SYMPTOMS
MUSCULOSKELETAL NEGATIVE: 1
CARDIOVASCULAR NEGATIVE: 1
DEPRESSION: 1
RESPIRATORY NEGATIVE: 1
CONSTITUTIONAL NEGATIVE: 1
GASTROINTESTINAL NEGATIVE: 1
NEUROLOGICAL NEGATIVE: 1

## 2020-07-02 ASSESSMENT — FIBROSIS 4 INDEX: FIB4 SCORE: 0.94

## 2020-07-02 NOTE — PROGRESS NOTES
Chief Complaint   Patient presents with   • Hypertension     Follow up       Subjective:   Danica Rapp is a 79 y.o. female who presents today primarily for follow-up of hypertension.  Because of the viral pandemic she has not been as physically active.  She had 2 trips canceled.  Her blood pressure at home been running a bit on the high side during the 145 range.  She did get a new blood pressure cuff which he brings in with her today.  No other interval problems.    Past Medical History:   Diagnosis Date   • Chickenpox    • Essential hypertension, benign 6/5/2012   • GERD (gastroesophageal reflux disease)    • [place] measles    • Mumps    • Nasal drainage    • Nonspecific abnormal electrocardiogram (ECG) (EKG) 6/5/2012   • Other chest pain 6/5/2012     Past Surgical History:   Procedure Laterality Date   • CHOLECYSTECTOMY     • HYSTERECTOMY LAPAROSCOPY       Family History   Problem Relation Age of Onset   • Lung Disease Mother    • Lung Disease Father    • Cancer Father      Social History     Socioeconomic History   • Marital status:      Spouse name: Not on file   • Number of children: Not on file   • Years of education: Not on file   • Highest education level: Not on file   Occupational History   • Not on file   Social Needs   • Financial resource strain: Not on file   • Food insecurity     Worry: Not on file     Inability: Not on file   • Transportation needs     Medical: Not on file     Non-medical: Not on file   Tobacco Use   • Smoking status: Never Smoker   • Smokeless tobacco: Never Used   Substance and Sexual Activity   • Alcohol use: Yes     Alcohol/week: 1.2 - 1.8 oz     Types: 2 Glasses of wine per week     Comment: occ   • Drug use: No   • Sexual activity: Not on file   Lifestyle   • Physical activity     Days per week: Not on file     Minutes per session: Not on file   • Stress: Not on file   Relationships   • Social connections     Talks on phone: Not on file     Gets together: Not on  file     Attends Holiness service: Not on file     Active member of club or organization: Not on file     Attends meetings of clubs or organizations: Not on file     Relationship status: Not on file   • Intimate partner violence     Fear of current or ex partner: Not on file     Emotionally abused: Not on file     Physically abused: Not on file     Forced sexual activity: Not on file   Other Topics Concern   • Not on file   Social History Narrative   • Not on file     Allergies   Allergen Reactions   • Macrodantin [Nitrofurantoin Macrocrystal] Itching and Swelling   • Amlodipine Swelling   • Augmentin Itching     Headaches   • Z-Pako [Azithromycin] Itching and Nausea     Outpatient Encounter Medications as of 7/2/2020   Medication Sig Dispense Refill   • potassium chloride (KLOR-CON) 8 MEQ tablet TAKE 1 TABLET BY MOUTH EVERY DAY (Patient taking differently: Take 16 mEq by mouth every day.) 90 Tab 0   • carvedilol (COREG) 12.5 MG Tab Take 1 Tab by mouth 2 times a day, with meals. 180 Tab 3   • valsartan-hydrochlorothiazide (DIOVAN-HCT) 320-12.5 MG per tablet TAKE 1 TABLET BY MOUTH EVERY DAY 90 Tab 2   • Cranberry (THERACRAN PO) Take  by mouth.     • vitamin D (CHOLECALCIFEROL) 1000 UNIT Tab Take 1,000 Units by mouth every day.     • Probiotic Product (PROBIOTIC DAILY PO) Take 1 Each by mouth every day.     • estradiol (VIVELLE DOT) 0.1 MG/24HR PTTW Apply 1 Patch to skin as directed every 7 days.     • Calcium Carbonate-Vitamin D (CALCIUM + D PO) Take 500 mg by mouth every day.     • Multiple Vitamin (MULTI-VITAMIN DAILY PO) Take  by mouth every day.     • estradiol (ESTRACE) 1 MG TABS Take 1 mg by mouth every day. Indications: taking half a tab     • omeprazole (PRILOSEC) 20 MG CPDR Take 20 mg by mouth every day.         No facility-administered encounter medications on file as of 7/2/2020.      Review of Systems   Constitutional: Negative.    HENT: Negative.    Respiratory: Negative.    Cardiovascular: Negative.   "  Gastrointestinal: Negative.    Musculoskeletal: Negative.    Skin: Negative.    Neurological: Negative.    Endo/Heme/Allergies: Negative.    Psychiatric/Behavioral: Positive for depression.        Objective:   /70 (BP Location: Left arm, Patient Position: Sitting, BP Cuff Size: Adult)   Pulse 80   Ht 1.588 m (5' 2.5\")   Wt 69.4 kg (153 lb)   SpO2 94%   BMI 27.54 kg/m²     Physical Exam   Constitutional: She is oriented to person, place, and time. No distress.   HENT:   Head: Normocephalic and atraumatic.   Eyes: Pupils are equal, round, and reactive to light. No scleral icterus.   Neck: No JVD present.   Cardiovascular: Normal rate and regular rhythm.   No murmur heard.  Pulmonary/Chest: No respiratory distress. She has no wheezes.   Abdominal: Soft. She exhibits no distension. There is no abdominal tenderness.   Musculoskeletal:         General: No edema.   Neurological: She is alert and oriented to person, place, and time.   Skin: Skin is warm.   Psychiatric: She has a normal mood and affect.       Assessment:     1. Essential hypertension, benign         Medical Decision Making:  Today's Assessment / Status / Plan:   Hypertension: Blood pressure is 145 systolic left arm sitting and this correlates well with her own cuff.  Discussed options of increasing her treatment but she does not want to do this.  The patient did not tolerate amlodipine in the past due to her leg edema.  She wishes to try nonpharmacologic measures, weekly weight loss and increased exercise rather than increasing her hypertensive medications.  She will return for reassessment of her blood pressure in about 2 months.  In the interim she will check her pressures at home with her new home cuff.  Since she has edema with amlodipine and does not want to increase her carvedilol because gives her some GI distress.  Would consider adding at bedtime clonidine to her regimen.  "

## 2020-08-15 DIAGNOSIS — I10 ESSENTIAL HYPERTENSION, BENIGN: ICD-10-CM

## 2020-08-17 RX ORDER — VALSARTAN AND HYDROCHLOROTHIAZIDE 320; 12.5 MG/1; MG/1
TABLET, FILM COATED ORAL
Qty: 90 TAB | Refills: 3 | Status: SHIPPED | OUTPATIENT
Start: 2020-08-17 | End: 2021-08-11 | Stop reason: SDUPTHER

## 2020-10-22 ENCOUNTER — HOSPITAL ENCOUNTER (OUTPATIENT)
Dept: LAB | Facility: MEDICAL CENTER | Age: 80
End: 2020-10-22
Attending: INTERNAL MEDICINE
Payer: MEDICARE

## 2020-10-22 LAB
ALBUMIN SERPL BCP-MCNC: 3.7 G/DL (ref 3.2–4.9)
ALBUMIN/GLOB SERPL: 1.1 G/DL
ALP SERPL-CCNC: 81 U/L (ref 30–99)
ALT SERPL-CCNC: 18 U/L (ref 2–50)
ANION GAP SERPL CALC-SCNC: 10 MMOL/L (ref 7–16)
AST SERPL-CCNC: 20 U/L (ref 12–45)
BASOPHILS # BLD AUTO: 0.5 % (ref 0–1.8)
BASOPHILS # BLD: 0.03 K/UL (ref 0–0.12)
BILIRUB SERPL-MCNC: 0.3 MG/DL (ref 0.1–1.5)
BUN SERPL-MCNC: 13 MG/DL (ref 8–22)
CALCIUM SERPL-MCNC: 9.3 MG/DL (ref 8.5–10.5)
CHLORIDE SERPL-SCNC: 93 MMOL/L (ref 96–112)
CHOLEST SERPL-MCNC: 219 MG/DL (ref 100–199)
CO2 SERPL-SCNC: 29 MMOL/L (ref 20–33)
CREAT SERPL-MCNC: 0.6 MG/DL (ref 0.5–1.4)
EOSINOPHIL # BLD AUTO: 0.1 K/UL (ref 0–0.51)
EOSINOPHIL NFR BLD: 1.5 % (ref 0–6.9)
ERYTHROCYTE [DISTWIDTH] IN BLOOD BY AUTOMATED COUNT: 49.1 FL (ref 35.9–50)
ERYTHROCYTE [SEDIMENTATION RATE] IN BLOOD BY WESTERGREN METHOD: 10 MM/HOUR (ref 0–30)
FASTING STATUS PATIENT QL REPORTED: NORMAL
GLOBULIN SER CALC-MCNC: 3.5 G/DL (ref 1.9–3.5)
GLUCOSE SERPL-MCNC: 76 MG/DL (ref 65–99)
HCT VFR BLD AUTO: 39.2 % (ref 37–47)
HDLC SERPL-MCNC: 58 MG/DL
HGB BLD-MCNC: 12.7 G/DL (ref 12–16)
IMM GRANULOCYTES # BLD AUTO: 0.03 K/UL (ref 0–0.11)
IMM GRANULOCYTES NFR BLD AUTO: 0.5 % (ref 0–0.9)
LDLC SERPL CALC-MCNC: 139 MG/DL
LYMPHOCYTES # BLD AUTO: 1.81 K/UL (ref 1–4.8)
LYMPHOCYTES NFR BLD: 27.3 % (ref 22–41)
MCH RBC QN AUTO: 28.8 PG (ref 27–33)
MCHC RBC AUTO-ENTMCNC: 32.4 G/DL (ref 33.6–35)
MCV RBC AUTO: 88.9 FL (ref 81.4–97.8)
MONOCYTES # BLD AUTO: 0.55 K/UL (ref 0–0.85)
MONOCYTES NFR BLD AUTO: 8.3 % (ref 0–13.4)
NEUTROPHILS # BLD AUTO: 4.12 K/UL (ref 2–7.15)
NEUTROPHILS NFR BLD: 61.9 % (ref 44–72)
NRBC # BLD AUTO: 0 K/UL
NRBC BLD-RTO: 0 /100 WBC
PLATELET # BLD AUTO: 436 K/UL (ref 164–446)
PMV BLD AUTO: 10.6 FL (ref 9–12.9)
POTASSIUM SERPL-SCNC: 3.5 MMOL/L (ref 3.6–5.5)
PROT SERPL-MCNC: 7.2 G/DL (ref 6–8.2)
RBC # BLD AUTO: 4.41 M/UL (ref 4.2–5.4)
SODIUM SERPL-SCNC: 132 MMOL/L (ref 135–145)
TRIGL SERPL-MCNC: 109 MG/DL (ref 0–149)
WBC # BLD AUTO: 6.6 K/UL (ref 4.8–10.8)

## 2020-10-22 PROCEDURE — 80061 LIPID PANEL: CPT

## 2020-10-22 PROCEDURE — 85025 COMPLETE CBC W/AUTO DIFF WBC: CPT

## 2020-10-22 PROCEDURE — 36415 COLL VENOUS BLD VENIPUNCTURE: CPT

## 2020-10-22 PROCEDURE — 85652 RBC SED RATE AUTOMATED: CPT

## 2020-10-22 PROCEDURE — 80053 COMPREHEN METABOLIC PANEL: CPT

## 2020-11-04 ENCOUNTER — APPOINTMENT (RX ONLY)
Dept: URBAN - METROPOLITAN AREA CLINIC 4 | Facility: CLINIC | Age: 80
Setting detail: DERMATOLOGY
End: 2020-11-04

## 2020-11-04 DIAGNOSIS — L82.1 OTHER SEBORRHEIC KERATOSIS: ICD-10-CM

## 2020-11-04 DIAGNOSIS — L81.4 OTHER MELANIN HYPERPIGMENTATION: ICD-10-CM | Status: STABLE

## 2020-11-04 DIAGNOSIS — L82.0 INFLAMED SEBORRHEIC KERATOSIS: ICD-10-CM

## 2020-11-04 DIAGNOSIS — L57.0 ACTINIC KERATOSIS: ICD-10-CM

## 2020-11-04 DIAGNOSIS — D22 MELANOCYTIC NEVI: ICD-10-CM

## 2020-11-04 PROBLEM — D22.5 MELANOCYTIC NEVI OF TRUNK: Status: ACTIVE | Noted: 2020-11-04

## 2020-11-04 PROCEDURE — ? COUNSELING

## 2020-11-04 PROCEDURE — 17110 DESTRUCTION B9 LES UP TO 14: CPT

## 2020-11-04 PROCEDURE — ? LIQUID NITROGEN

## 2020-11-04 PROCEDURE — 17003 DESTRUCT PREMALG LES 2-14: CPT | Mod: 59

## 2020-11-04 PROCEDURE — ? OBSERVATION AND MEASURE

## 2020-11-04 PROCEDURE — ? DIAGNOSIS COMMENT

## 2020-11-04 PROCEDURE — 99213 OFFICE O/P EST LOW 20 MIN: CPT | Mod: 25

## 2020-11-04 PROCEDURE — 17000 DESTRUCT PREMALG LESION: CPT | Mod: 59

## 2020-11-04 ASSESSMENT — LOCATION SIMPLE DESCRIPTION DERM
LOCATION SIMPLE: LEFT FOREHEAD
LOCATION SIMPLE: NOSE
LOCATION SIMPLE: UPPER BACK
LOCATION SIMPLE: LEFT ZYGOMA
LOCATION SIMPLE: RIGHT BREAST
LOCATION SIMPLE: LEFT CHEEK
LOCATION SIMPLE: RIGHT PRETIBIAL REGION
LOCATION SIMPLE: ABDOMEN
LOCATION SIMPLE: RIGHT UPPER BACK

## 2020-11-04 ASSESSMENT — LOCATION DETAILED DESCRIPTION DERM
LOCATION DETAILED: RIGHT PROXIMAL PRETIBIAL REGION
LOCATION DETAILED: RIGHT SUPERIOR MEDIAL UPPER BACK
LOCATION DETAILED: RIGHT INFRAMAMMARY CREASE (OUTER QUADRANT)
LOCATION DETAILED: LEFT INFERIOR MEDIAL FOREHEAD
LOCATION DETAILED: NASAL TIP
LOCATION DETAILED: INFERIOR THORACIC SPINE
LOCATION DETAILED: LEFT RIB CAGE
LOCATION DETAILED: RIGHT MEDIAL BREAST 2-3:00 REGION
LOCATION DETAILED: RIGHT RIB CAGE
LOCATION DETAILED: LEFT LATERAL ABDOMEN
LOCATION DETAILED: LEFT INFERIOR LATERAL MALAR CHEEK
LOCATION DETAILED: RIGHT LATERAL BREAST 6-7:00 REGION
LOCATION DETAILED: LEFT CENTRAL ZYGOMA

## 2020-11-04 ASSESSMENT — LOCATION ZONE DERM
LOCATION ZONE: LEG
LOCATION ZONE: TRUNK
LOCATION ZONE: NOSE
LOCATION ZONE: FACE

## 2020-11-04 NOTE — PROCEDURE: LIQUID NITROGEN
Detail Level: Detailed
Render Note In Bullet Format When Appropriate: No
Consent: The patient's consent was obtained including but not limited to risks of crusting, scabbing, blistering, scarring, darker or lighter pigmentary change, recurrence, incomplete removal and infection.
Include Z78.9 (Other Specified Conditions Influencing Health Status) As An Associated Diagnosis?: Yes
Post-Care Instructions: I reviewed with the patient in detail post-care instructions. Patient is to wear sunprotection, and avoid picking at any of the treated lesions. Pt may apply Vaseline to crusted or scabbing areas.
Medical Necessity Clause: This procedure was medically necessary because the lesions that were treated were:
Medical Necessity Information: It is in your best interest to select a reason for this procedure from the list below. All of these items fulfill various CMS LCD requirements except the new and changing color options.
Duration Of Freeze Thaw-Cycle (Seconds): 0

## 2020-11-04 NOTE — PROCEDURE: COUNSELING
Detail Level: Zone
Patient Specific Counseling (Will Not Stick From Patient To Patient): Patient referred to Olga Lidia to discuss possible laser referral.
Detail Level: Generalized
Detail Level: Detailed

## 2020-12-09 ENCOUNTER — OFFICE VISIT (OUTPATIENT)
Dept: CARDIOLOGY | Facility: MEDICAL CENTER | Age: 80
End: 2020-12-09
Payer: MEDICARE

## 2020-12-09 VITALS
WEIGHT: 152.6 LBS | HEART RATE: 74 BPM | BODY MASS INDEX: 27.04 KG/M2 | SYSTOLIC BLOOD PRESSURE: 134 MMHG | OXYGEN SATURATION: 98 % | HEIGHT: 63 IN | DIASTOLIC BLOOD PRESSURE: 80 MMHG | RESPIRATION RATE: 16 BRPM

## 2020-12-09 DIAGNOSIS — I10 ESSENTIAL HYPERTENSION, BENIGN: ICD-10-CM

## 2020-12-09 DIAGNOSIS — R06.09 DOE (DYSPNEA ON EXERTION): ICD-10-CM

## 2020-12-09 DIAGNOSIS — I10 ESSENTIAL HYPERTENSION: ICD-10-CM

## 2020-12-09 PROCEDURE — 99213 OFFICE O/P EST LOW 20 MIN: CPT | Performed by: INTERNAL MEDICINE

## 2020-12-09 RX ORDER — PHENAZOPYRIDINE HYDROCHLORIDE 100 MG/1
TABLET, FILM COATED ORAL
COMMUNITY
Start: 2020-11-10 | End: 2021-11-09

## 2020-12-09 RX ORDER — CIPROFLOXACIN 500 MG/1
TABLET, FILM COATED ORAL
COMMUNITY
Start: 2020-11-10 | End: 2021-11-09

## 2020-12-09 RX ORDER — SULFAMETHOXAZOLE AND TRIMETHOPRIM 800; 160 MG/1; MG/1
TABLET ORAL
COMMUNITY
Start: 2020-11-10 | End: 2021-11-09

## 2020-12-09 RX ORDER — CARVEDILOL 25 MG/1
25 TABLET ORAL 2 TIMES DAILY WITH MEALS
Qty: 180 TAB | Refills: 3 | Status: SHIPPED | OUTPATIENT
Start: 2020-12-09 | End: 2021-11-12

## 2020-12-09 RX ORDER — ESTRADIOL 0.5 MG/1
TABLET ORAL
COMMUNITY
Start: 2020-12-08 | End: 2021-06-02

## 2020-12-09 ASSESSMENT — FIBROSIS 4 INDEX: FIB4 SCORE: 0.86

## 2020-12-09 NOTE — PROGRESS NOTES
Chief Complaint   Patient presents with   • Hypertension   • Chest Pain       Subjective:   Danica Rapp is a 80 y.o. female who presents today for follow-up of hypertension.  Her blood pressures at home have been high.  Last visit her pressure was elevated and she did not want increase her medications.  Patient problems exercise more lose weight but has done neither.  Her pressures at home have been in the 170 range at time.  No other interval health issues    Past Medical History:   Diagnosis Date   • Chickenpox    • Essential hypertension, benign 6/5/2012   • GERD (gastroesophageal reflux disease)    • [place] measles    • Mumps    • Nasal drainage    • Nonspecific abnormal electrocardiogram (ECG) (EKG) 6/5/2012   • Other chest pain 6/5/2012     Past Surgical History:   Procedure Laterality Date   • CHOLECYSTECTOMY     • HYSTERECTOMY LAPAROSCOPY       Family History   Problem Relation Age of Onset   • Lung Disease Mother    • Lung Disease Father    • Cancer Father      Social History     Socioeconomic History   • Marital status:      Spouse name: Not on file   • Number of children: Not on file   • Years of education: Not on file   • Highest education level: Not on file   Occupational History   • Not on file   Social Needs   • Financial resource strain: Not on file   • Food insecurity     Worry: Not on file     Inability: Not on file   • Transportation needs     Medical: Not on file     Non-medical: Not on file   Tobacco Use   • Smoking status: Never Smoker   • Smokeless tobacco: Never Used   Substance and Sexual Activity   • Alcohol use: Yes     Alcohol/week: 1.2 - 1.8 oz     Types: 2 Glasses of wine per week     Comment: occ   • Drug use: No   • Sexual activity: Not on file   Lifestyle   • Physical activity     Days per week: Not on file     Minutes per session: Not on file   • Stress: Not on file   Relationships   • Social connections     Talks on phone: Not on file     Gets together: Not on file      Attends Yazidi service: Not on file     Active member of club or organization: Not on file     Attends meetings of clubs or organizations: Not on file     Relationship status: Not on file   • Intimate partner violence     Fear of current or ex partner: Not on file     Emotionally abused: Not on file     Physically abused: Not on file     Forced sexual activity: Not on file   Other Topics Concern   • Not on file   Social History Narrative   • Not on file     Allergies   Allergen Reactions   • Macrodantin [Nitrofurantoin Macrocrystal] Itching and Swelling   • Amlodipine Swelling   • Augmentin Itching     Headaches   • Z-Pako [Azithromycin] Itching and Nausea     Outpatient Encounter Medications as of 12/9/2020   Medication Sig Dispense Refill   • estradiol (CLIMARA) 0.1 MG/24HR PATCH WEEKLY      • estradiol (ESTRACE) 0.5 MG tablet 0.25mg Takes 3 days a week     • carvedilol (COREG) 25 MG Tab Take 1 Tab by mouth 2 times a day, with meals. 180 Tab 3   • valsartan-hydrochlorothiazide (DIOVAN-HCT) 320-12.5 MG per tablet TAKE 1 TABLET BY MOUTH EVERY DAY 90 Tab 3   • potassium chloride (KLOR-CON) 8 MEQ tablet TAKE 1 TABLET BY MOUTH EVERY DAY (Patient taking differently: Take 16 mEq by mouth every day.) 90 Tab 0   • Cranberry (THERACRAN PO) Take  by mouth.     • vitamin D (CHOLECALCIFEROL) 1000 UNIT Tab Take 1,000 Units by mouth every day. 2000mg     • Probiotic Product (PROBIOTIC DAILY PO) Take 1 Each by mouth every day.     • Calcium Carbonate-Vitamin D (CALCIUM + D PO) Take 500 mg by mouth every day.     • Multiple Vitamin (MULTI-VITAMIN DAILY PO) Take  by mouth every day.     • omeprazole (PRILOSEC) 20 MG CPDR Take 20 mg by mouth every day.       • ciprofloxacin (CIPRO) 500 MG Tab Take  by mouth. Take 1 tablet by mouth two times a day     • phenazopyridine (PYRIDIUM) 100 MG Tab TK 1 TO 2 TS PO TID PRN     • sulfamethoxazole-trimethoprim (BACTRIM DS) 800-160 MG tablet TK 1 T PO  BID X 7 DAYS PER MD     • [DISCONTINUED]  "carvedilol (COREG) 12.5 MG Tab Take 1 Tab by mouth 2 times a day, with meals. 180 Tab 3   • estradiol (VIVELLE DOT) 0.1 MG/24HR PTTW Apply 1 Patch to skin as directed every 7 days.     • estradiol (ESTRACE) 1 MG TABS Take 1 mg by mouth every day. Indications: taking half a tab       No facility-administered encounter medications on file as of 12/9/2020.      ROS     Objective:   /80 (BP Location: Left arm, Patient Position: Sitting, BP Cuff Size: Adult)   Pulse 74   Resp 16   Ht 1.588 m (5' 2.5\")   Wt 69.2 kg (152 lb 9.6 oz)   SpO2 98%   BMI 27.47 kg/m²     Physical Exam    Assessment:     1. BASSETT (dyspnea on exertion)     2. Essential hypertension, benign     3. Essential hypertension  carvedilol (COREG) 25 MG Tab       Medical Decision Making:  Today's Assessment / Status / Plan:   Hypertension: Her blood pressure is under good control by my reading is 155 systolic bilaterally.  Her carvedilol will be increased to 25 mg twice daily.  Continue valsartan/HCTZ.  Return in 1 month for blood pressure check.  "

## 2020-12-24 ENCOUNTER — OFFICE VISIT (OUTPATIENT)
Dept: CARDIOLOGY | Facility: MEDICAL CENTER | Age: 80
End: 2020-12-24
Payer: MEDICARE

## 2020-12-24 VITALS
WEIGHT: 153 LBS | RESPIRATION RATE: 14 BRPM | SYSTOLIC BLOOD PRESSURE: 158 MMHG | DIASTOLIC BLOOD PRESSURE: 74 MMHG | HEART RATE: 82 BPM | BODY MASS INDEX: 27.11 KG/M2 | OXYGEN SATURATION: 97 % | HEIGHT: 63 IN

## 2020-12-24 DIAGNOSIS — I10 ESSENTIAL HYPERTENSION, BENIGN: ICD-10-CM

## 2020-12-24 DIAGNOSIS — R42 DIZZINESS: ICD-10-CM

## 2020-12-24 DIAGNOSIS — R07.89 OTHER CHEST PAIN: ICD-10-CM

## 2020-12-24 DIAGNOSIS — R06.09 DOE (DYSPNEA ON EXERTION): ICD-10-CM

## 2020-12-24 PROCEDURE — 99213 OFFICE O/P EST LOW 20 MIN: CPT | Performed by: INTERNAL MEDICINE

## 2020-12-24 RX ORDER — AMLODIPINE BESYLATE 2.5 MG/1
2.5 TABLET ORAL DAILY
Qty: 30 TAB | Refills: 4 | Status: SHIPPED | OUTPATIENT
Start: 2020-12-24 | End: 2020-12-28

## 2020-12-24 ASSESSMENT — FIBROSIS 4 INDEX: FIB4 SCORE: 0.86

## 2020-12-24 ASSESSMENT — ENCOUNTER SYMPTOMS
CARDIOVASCULAR NEGATIVE: 1
CONSTITUTIONAL NEGATIVE: 1
RESPIRATORY NEGATIVE: 1
DEPRESSION: 1
MUSCULOSKELETAL NEGATIVE: 1
DIZZINESS: 1
GASTROINTESTINAL NEGATIVE: 1

## 2020-12-24 NOTE — PROGRESS NOTES
"Chief Complaint   Patient presents with   • Hypertension       Subjective:   Danica Rapp is a 80 y.o. female who presents today primarily for blood pressure check.  She was seen early in the month with complaints of uncontrolled blood pressure.  Her pressures at home at that time had exceeded 170.  She had no exertional dyspnea, sense of palpitations or chest discomfort.  She has had an ill-defined \"dizziness\" that does not sound like vertigo by her description.  She did see her ear nose and throat physician who noted that her ear canals were plugged with cerumen but felt that the dizziness was not due to ENT problem    Past Medical History:   Diagnosis Date   • Chickenpox    • Essential hypertension, benign 6/5/2012   • GERD (gastroesophageal reflux disease)    • Persian measles    • Mumps    • Nasal drainage    • Nonspecific abnormal electrocardiogram (ECG) (EKG) 6/5/2012   • Other chest pain 6/5/2012     Past Surgical History:   Procedure Laterality Date   • CHOLECYSTECTOMY     • HYSTERECTOMY LAPAROSCOPY       Family History   Problem Relation Age of Onset   • Lung Disease Mother    • Lung Disease Father    • Cancer Father      Social History     Socioeconomic History   • Marital status:      Spouse name: Not on file   • Number of children: Not on file   • Years of education: Not on file   • Highest education level: Not on file   Occupational History   • Not on file   Social Needs   • Financial resource strain: Not on file   • Food insecurity     Worry: Not on file     Inability: Not on file   • Transportation needs     Medical: Not on file     Non-medical: Not on file   Tobacco Use   • Smoking status: Never Smoker   • Smokeless tobacco: Never Used   Substance and Sexual Activity   • Alcohol use: Yes     Alcohol/week: 1.2 - 1.8 oz     Types: 2 Glasses of wine per week     Comment: occ   • Drug use: No   • Sexual activity: Not on file   Lifestyle   • Physical activity     Days per week: Not on file     " Minutes per session: Not on file   • Stress: Not on file   Relationships   • Social connections     Talks on phone: Not on file     Gets together: Not on file     Attends Denominational service: Not on file     Active member of club or organization: Not on file     Attends meetings of clubs or organizations: Not on file     Relationship status: Not on file   • Intimate partner violence     Fear of current or ex partner: Not on file     Emotionally abused: Not on file     Physically abused: Not on file     Forced sexual activity: Not on file   Other Topics Concern   • Not on file   Social History Narrative   • Not on file     Allergies   Allergen Reactions   • Macrodantin [Nitrofurantoin Macrocrystal] Itching and Swelling   • Amlodipine Swelling   • Augmentin Itching     Headaches   • Z-Pako [Azithromycin] Itching and Nausea     Outpatient Encounter Medications as of 12/24/2020   Medication Sig Dispense Refill   • amLODIPine (NORVASC) 2.5 MG Tab Take 1 Tab by mouth every day. 30 Tab 4   • ciprofloxacin (CIPRO) 500 MG Tab Take  by mouth. Take 1 tablet by mouth two times a day     • estradiol (CLIMARA) 0.1 MG/24HR PATCH WEEKLY      • estradiol (ESTRACE) 0.5 MG tablet 0.25mg Takes 3 days a week     • phenazopyridine (PYRIDIUM) 100 MG Tab TK 1 TO 2 TS PO TID PRN     • sulfamethoxazole-trimethoprim (BACTRIM DS) 800-160 MG tablet TK 1 T PO  BID X 7 DAYS PER MD     • carvedilol (COREG) 25 MG Tab Take 1 Tab by mouth 2 times a day, with meals. 180 Tab 3   • valsartan-hydrochlorothiazide (DIOVAN-HCT) 320-12.5 MG per tablet TAKE 1 TABLET BY MOUTH EVERY DAY 90 Tab 3   • potassium chloride (KLOR-CON) 8 MEQ tablet TAKE 1 TABLET BY MOUTH EVERY DAY (Patient taking differently: Take 24 mEq by mouth every day.) 90 Tab 0   • Cranberry (THERACRAN PO) Take  by mouth.     • vitamin D (CHOLECALCIFEROL) 1000 UNIT Tab Take 1,000 Units by mouth every day. 2000mg     • Probiotic Product (PROBIOTIC DAILY PO) Take 1 Each by mouth every day.     •  "estradiol (VIVELLE DOT) 0.1 MG/24HR PTTW Apply 1 Patch to skin as directed every 7 days.     • Calcium Carbonate-Vitamin D (CALCIUM + D PO) Take 500 mg by mouth every day.     • Multiple Vitamin (MULTI-VITAMIN DAILY PO) Take  by mouth every day.     • estradiol (ESTRACE) 1 MG TABS Take 1 mg by mouth every day. Indications: taking half a tab     • omeprazole (PRILOSEC) 20 MG CPDR Take 20 mg by mouth every day.         No facility-administered encounter medications on file as of 12/24/2020.      Review of Systems   Constitutional: Negative.    HENT: Negative.    Respiratory: Negative.    Cardiovascular: Negative.    Gastrointestinal: Negative.    Musculoskeletal: Negative.    Skin: Negative.    Neurological: Positive for dizziness.   Endo/Heme/Allergies: Negative.    Psychiatric/Behavioral: Positive for depression.        Objective:   /74 (BP Location: Left arm, Patient Position: Sitting, BP Cuff Size: Adult)   Pulse 82   Resp 14   Ht 1.588 m (5' 2.5\")   Wt 69.4 kg (153 lb)   SpO2 97%   BMI 27.54 kg/m²     Physical Exam   Constitutional: She is oriented to person, place, and time. No distress.   Orthostatic blood pressures performed in the office.  Blood pressure left arm supine 160/82.  Left arm standing 155/80 without significant change in heart rate   HENT:   Head: Normocephalic and atraumatic.   Eyes: Pupils are equal, round, and reactive to light. No scleral icterus.   Neck: No JVD present.   Cardiovascular: Normal rate and regular rhythm.   No murmur heard.  Pulmonary/Chest: No respiratory distress. She has no wheezes.   Abdominal: Soft. She exhibits no distension. There is no abdominal tenderness.   Musculoskeletal:         General: No edema.   Neurological: She is alert and oriented to person, place, and time.   Skin: Skin is warm.   Psychiatric: She exhibits a depressed mood.       Assessment:     1. BASSETT (dyspnea on exertion)     2. Essential hypertension, benign     3. Other chest pain     4. " "Dizziness  REFERRAL TO NEUROLOGY       Medical Decision Making:  Today's Assessment / Status / Plan:   Hypertension: Control improved somewhat with increase carvedilol but still not to target.  She had been on amlodipine in the past and tolerated low-dose but has some swelling on 5 mg a day.  She is, as always, reluctant to change her blood pressure medications.  Amlodipine 2.5 mg a day will be added to her regimen she will continue her current dose of carvedilol 25 mg twice daily and her valsartan/HCTZ.  She has had hyponatremia with diuretics in the past but most recent lab revealed sodium was mildly reduced at 132, and she does need the diuretic for blood pressure control    Dizziness: The patient has an ill-defined symptom of \"dizziness\".  It does not sound like vertigo at all by questioning.  There is no evidence of orthostatic hypotension on exam today.  I do not think her symptoms are due to uncontrolled hypertension.  At times, her pressure at home is gone down to as low as 127 systolic and she still has the symptoms.  Recommend nerve neurology evaluation to ensure that she is not had a posterior fossa stroke or some other cause for her symptoms.    Depression: Patient had a very difficult time with her 's passing in May, 2017.  She has been tearful again in the office today all asked me questions about his last days in the hospital.    Plan add amlodipine 2.5 mg daily to her regimen for improved blood pressure control  Referral to neurology for evaluation of \"dizziness\"  "

## 2020-12-28 RX ORDER — AMLODIPINE BESYLATE 2.5 MG/1
TABLET ORAL
Qty: 90 TAB | Refills: 1 | Status: SHIPPED | OUTPATIENT
Start: 2020-12-28 | End: 2021-02-12

## 2021-01-12 DIAGNOSIS — Z23 NEED FOR VACCINATION: ICD-10-CM

## 2021-02-12 ENCOUNTER — OFFICE VISIT (OUTPATIENT)
Dept: CARDIOLOGY | Facility: MEDICAL CENTER | Age: 81
End: 2021-02-12
Payer: MEDICARE

## 2021-02-12 VITALS
BODY MASS INDEX: 28.52 KG/M2 | HEART RATE: 76 BPM | SYSTOLIC BLOOD PRESSURE: 148 MMHG | HEIGHT: 62 IN | WEIGHT: 155 LBS | OXYGEN SATURATION: 97 % | DIASTOLIC BLOOD PRESSURE: 82 MMHG

## 2021-02-12 DIAGNOSIS — I10 ESSENTIAL HYPERTENSION, BENIGN: ICD-10-CM

## 2021-02-12 PROCEDURE — 99213 OFFICE O/P EST LOW 20 MIN: CPT | Performed by: INTERNAL MEDICINE

## 2021-02-12 RX ORDER — AMLODIPINE BESYLATE 5 MG/1
5 TABLET ORAL
Qty: 90 TABLET | Refills: 3 | Status: SHIPPED | OUTPATIENT
Start: 2021-02-12 | End: 2021-11-09

## 2021-02-12 ASSESSMENT — ENCOUNTER SYMPTOMS
DEPRESSION: 1
MUSCULOSKELETAL NEGATIVE: 1
DIZZINESS: 0
CONSTITUTIONAL NEGATIVE: 1
RESPIRATORY NEGATIVE: 1
GASTROINTESTINAL NEGATIVE: 1
CARDIOVASCULAR NEGATIVE: 1

## 2021-02-12 ASSESSMENT — FIBROSIS 4 INDEX: FIB4 SCORE: 0.86

## 2021-02-12 NOTE — PROGRESS NOTES
Chief Complaint   Patient presents with   • HTN (Controlled)   • Chest Pain   • Shortness of Breath     & BASSETT (dyspnea on exertion)       Subjective:   Danica Rapp is a 80 y.o. adult who presents today primarily for follow-up of her blood pressure.  Last visit she was started on amlodipine in addition to her other medications.  She is tolerating this medication well and blood pressures at home have been improved but still not at target.  She also has multiple complaints about hormones etc.  She is still grieving over her 's loss.  The patient is however exercising more which is helpful.    Past Medical History:   Diagnosis Date   • Chickenpox    • Essential hypertension, benign 6/5/2012   • GERD (gastroesophageal reflux disease)    • [place] measles    • Mumps    • Nasal drainage    • Nonspecific abnormal electrocardiogram (ECG) (EKG) 6/5/2012   • Other chest pain 6/5/2012     Past Surgical History:   Procedure Laterality Date   • CHOLECYSTECTOMY     • HYSTERECTOMY LAPAROSCOPY       Family History   Problem Relation Age of Onset   • Lung Disease Mother    • Lung Disease Father    • Cancer Father      Social History     Socioeconomic History   • Marital status:      Spouse name: Not on file   • Number of children: Not on file   • Years of education: Not on file   • Highest education level: Not on file   Occupational History   • Not on file   Tobacco Use   • Smoking status: Never Smoker   • Smokeless tobacco: Never Used   Substance and Sexual Activity   • Alcohol use: Yes     Alcohol/week: 1.2 - 1.8 oz     Types: 2 Glasses of wine per week     Comment: occ   • Drug use: No   • Sexual activity: Not on file   Other Topics Concern   • Not on file   Social History Narrative   • Not on file     Social Determinants of Health     Financial Resource Strain:    • Difficulty of Paying Living Expenses:    Food Insecurity:    • Worried About Running Out of Food in the Last Year:    • Ran Out of Food in the Last  Year:    Transportation Needs:    • Lack of Transportation (Medical):    • Lack of Transportation (Non-Medical):    Physical Activity:    • Days of Exercise per Week:    • Minutes of Exercise per Session:    Stress:    • Feeling of Stress :    Social Connections:    • Frequency of Communication with Friends and Family:    • Frequency of Social Gatherings with Friends and Family:    • Attends Yarsanism Services:    • Active Member of Clubs or Organizations:    • Attends Club or Organization Meetings:    • Marital Status:    Intimate Partner Violence:    • Fear of Current or Ex-Partner:    • Emotionally Abused:    • Physically Abused:    • Sexually Abused:      Allergies   Allergen Reactions   • Macrodantin [Nitrofurantoin Macrocrystal] Itching and Swelling   • Amlodipine Swelling   • Augmentin Itching     Headaches   • Z-Pako [Azithromycin] Itching and Nausea     Outpatient Encounter Medications as of 2/12/2021   Medication Sig Dispense Refill   • amLODIPine (NORVASC) 5 MG Tab Take 1 tablet by mouth every day. 90 tablet 3   • ciprofloxacin (CIPRO) 500 MG Tab Take  by mouth. Take 1 tablet by mouth two times a day     • estradiol (CLIMARA) 0.1 MG/24HR PATCH WEEKLY      • estradiol (ESTRACE) 0.5 MG tablet 0.25mg Takes 3 days a week     • phenazopyridine (PYRIDIUM) 100 MG Tab TK 1 TO 2 TS PO TID PRN     • sulfamethoxazole-trimethoprim (BACTRIM DS) 800-160 MG tablet TK 1 T PO  BID X 7 DAYS PER MD     • carvedilol (COREG) 25 MG Tab Take 1 Tab by mouth 2 times a day, with meals. 180 Tab 3   • valsartan-hydrochlorothiazide (DIOVAN-HCT) 320-12.5 MG per tablet TAKE 1 TABLET BY MOUTH EVERY DAY 90 Tab 3   • potassium chloride (KLOR-CON) 8 MEQ tablet TAKE 1 TABLET BY MOUTH EVERY DAY (Patient taking differently: Take 24 mEq by mouth every day.) 90 Tab 0   • Cranberry (THERACRAN PO) Take  by mouth.     • vitamin D (CHOLECALCIFEROL) 1000 UNIT Tab Take 1,000 Units by mouth every day. 2000mg     • Probiotic Product (PROBIOTIC DAILY PO)  "Take 1 Each by mouth every day.     • estradiol (VIVELLE DOT) 0.1 MG/24HR PTTW Apply 1 Patch to skin as directed every 7 days.     • Calcium Carbonate-Vitamin D (CALCIUM + D PO) Take 500 mg by mouth every day.     • Multiple Vitamin (MULTI-VITAMIN DAILY PO) Take  by mouth every day.     • estradiol (ESTRACE) 1 MG TABS Take 1 mg by mouth every day. Indications: taking half a tab     • omeprazole (PRILOSEC) 20 MG CPDR Take 20 mg by mouth every day.       • [DISCONTINUED] amLODIPine (NORVASC) 2.5 MG Tab TAKE 1 TABLET BY MOUTH EVERY DAY 90 Tab 1     No facility-administered encounter medications on file as of 2/12/2021.     Review of Systems   Constitutional: Negative.    HENT: Negative.    Respiratory: Negative.    Cardiovascular: Negative.    Gastrointestinal: Negative.    Musculoskeletal: Negative.    Skin: Negative.    Neurological: Negative for dizziness.   Endo/Heme/Allergies: Negative.    Psychiatric/Behavioral: Positive for depression.        Objective:   /82 (BP Location: Left arm, Patient Position: Sitting, BP Cuff Size: Adult)   Pulse 76   Ht 1.575 m (5' 2\")   Wt 70.3 kg (155 lb)   SpO2 97%   BMI 28.35 kg/m²     Physical Exam   Constitutional: Danica Rapp is oriented to person, place, and time. No distress.   HENT:   Head: Normocephalic and atraumatic.   Eyes: Pupils are equal, round, and reactive to light. No scleral icterus.   Neck: No JVD present.   Cardiovascular: Normal rate and regular rhythm.   No murmur heard.  Pulmonary/Chest: No respiratory distress. Danica Rapp has no wheezes.   Abdominal: Soft. Danica Rapp exhibits no distension. There is no abdominal tenderness.   Musculoskeletal:         General: No edema.   Neurological: Danica Rapp is alert and oriented to person, place, and time.   Skin: Skin is warm.   Psychiatric: Danica Rapp's mood appears anxious.       Assessment:     1. Essential hypertension, benign  amLODIPine (NORVASC) 5 MG Tab       Medical " Decision Making:  Today's Assessment / Status / Plan:   Hypertension: Still not under adequate control.  Will increase her amlodipine to 5 mg a day and continue the current dose of carvedilol and valsartan/HCTZ.  Return in 4 months for blood pressure check

## 2021-03-31 ENCOUNTER — APPOINTMENT (RX ONLY)
Dept: URBAN - METROPOLITAN AREA CLINIC 4 | Facility: CLINIC | Age: 81
Setting detail: DERMATOLOGY
End: 2021-03-31

## 2021-03-31 DIAGNOSIS — L57.0 ACTINIC KERATOSIS: ICD-10-CM

## 2021-03-31 DIAGNOSIS — L82.0 INFLAMED SEBORRHEIC KERATOSIS: ICD-10-CM

## 2021-03-31 DIAGNOSIS — L82.1 OTHER SEBORRHEIC KERATOSIS: ICD-10-CM

## 2021-03-31 DIAGNOSIS — L81.4 OTHER MELANIN HYPERPIGMENTATION: ICD-10-CM | Status: STABLE

## 2021-03-31 PROCEDURE — ? OBSERVATION AND MEASURE

## 2021-03-31 PROCEDURE — 17003 DESTRUCT PREMALG LES 2-14: CPT | Mod: 59

## 2021-03-31 PROCEDURE — ? LIQUID NITROGEN

## 2021-03-31 PROCEDURE — ? DIAGNOSIS COMMENT

## 2021-03-31 PROCEDURE — 17111 DESTRUCTION B9 LESIONS 15/>: CPT

## 2021-03-31 PROCEDURE — 17000 DESTRUCT PREMALG LESION: CPT | Mod: 59

## 2021-03-31 PROCEDURE — 99213 OFFICE O/P EST LOW 20 MIN: CPT | Mod: 25

## 2021-03-31 PROCEDURE — ? COUNSELING

## 2021-03-31 ASSESSMENT — LOCATION DETAILED DESCRIPTION DERM
LOCATION DETAILED: RIGHT SUPERIOR NASAL CHEEK
LOCATION DETAILED: RIGHT RIB CAGE
LOCATION DETAILED: LEFT MEDIAL ZYGOMA
LOCATION DETAILED: RIGHT MEDIAL BREAST 4-5:00 REGION
LOCATION DETAILED: RIGHT PROXIMAL PRETIBIAL REGION
LOCATION DETAILED: LEFT LATERAL BREAST 2-3:00 REGION
LOCATION DETAILED: LOWER STERNUM
LOCATION DETAILED: LEFT INFERIOR CENTRAL MALAR CHEEK
LOCATION DETAILED: RIGHT LATERAL BREAST 6-7:00 REGION
LOCATION DETAILED: LEFT UPPER CUTANEOUS LIP
LOCATION DETAILED: INFERIOR THORACIC SPINE
LOCATION DETAILED: LEFT MEDIAL BREAST 9-10:00 REGION
LOCATION DETAILED: LEFT MEDIAL BREAST 8-9:00 REGION
LOCATION DETAILED: LEFT CLAVICULAR NECK
LOCATION DETAILED: RIGHT INFERIOR LATERAL NECK
LOCATION DETAILED: LEFT MEDIAL SUPERIOR CHEST
LOCATION DETAILED: RIGHT LOWER CUTANEOUS LIP
LOCATION DETAILED: RIGHT AXILLARY TAIL OF BREAST
LOCATION DETAILED: LEFT INFRAMAMMARY CREASE (INNER QUADRANT)
LOCATION DETAILED: RIGHT MEDIAL BUCCAL CHEEK
LOCATION DETAILED: RIGHT LATERAL BREAST 10-11:00 REGION
LOCATION DETAILED: RIGHT SUPERIOR MEDIAL UPPER BACK
LOCATION DETAILED: LEFT INFERIOR LATERAL NECK
LOCATION DETAILED: LEFT MEDIAL BREAST 10-11:00 REGION
LOCATION DETAILED: RIGHT DISTAL PRETIBIAL REGION
LOCATION DETAILED: SUBXIPHOID
LOCATION DETAILED: RIGHT MEDIAL BREAST 5-6:00 REGION
LOCATION DETAILED: LEFT RIB CAGE

## 2021-03-31 ASSESSMENT — LOCATION SIMPLE DESCRIPTION DERM
LOCATION SIMPLE: ABDOMEN
LOCATION SIMPLE: LEFT ANTERIOR NECK
LOCATION SIMPLE: LEFT CHEEK
LOCATION SIMPLE: RIGHT ANTERIOR NECK
LOCATION SIMPLE: RIGHT PRETIBIAL REGION
LOCATION SIMPLE: LEFT LIP
LOCATION SIMPLE: RIGHT UPPER BACK
LOCATION SIMPLE: RIGHT CHEEK
LOCATION SIMPLE: LEFT ZYGOMA
LOCATION SIMPLE: RIGHT BREAST
LOCATION SIMPLE: CHEST
LOCATION SIMPLE: UPPER BACK
LOCATION SIMPLE: LEFT BREAST
LOCATION SIMPLE: RIGHT LIP

## 2021-03-31 ASSESSMENT — LOCATION ZONE DERM
LOCATION ZONE: NECK
LOCATION ZONE: FACE
LOCATION ZONE: LIP
LOCATION ZONE: LEG
LOCATION ZONE: TRUNK

## 2021-03-31 NOTE — PROCEDURE: LIQUID NITROGEN
Duration Of Freeze Thaw-Cycle (Seconds): 0
Render Post-Care Instructions In Note?: no
Post-Care Instructions: I reviewed with the patient in detail post-care instructions. Patient is to wear sunprotection, and avoid picking at any of the treated lesions. Pt may apply Vaseline to crusted or scabbing areas.
Consent: The patient's consent was obtained including but not limited to risks of crusting, scabbing, blistering, scarring, darker or lighter pigmentary change, recurrence, incomplete removal and infection.
Detail Level: Detailed
Medical Necessity Information: It is in your best interest to select a reason for this procedure from the list below. All of these items fulfill various CMS LCD requirements except the new and changing color options.
Medical Necessity Clause: This procedure was medically necessary because the lesions that were treated were:
Include Z78.9 (Other Specified Conditions Influencing Health Status) As An Associated Diagnosis?: Yes

## 2021-04-06 ENCOUNTER — HOSPITAL ENCOUNTER (OUTPATIENT)
Facility: MEDICAL CENTER | Age: 81
End: 2021-04-06
Attending: UROLOGY
Payer: MEDICARE

## 2021-04-06 PROCEDURE — 87086 URINE CULTURE/COLONY COUNT: CPT

## 2021-04-08 ENCOUNTER — APPOINTMENT (RX ONLY)
Dept: URBAN - METROPOLITAN AREA CLINIC 20 | Facility: CLINIC | Age: 81
Setting detail: DERMATOLOGY
End: 2021-04-08

## 2021-04-08 DIAGNOSIS — Z41.9 ENCOUNTER FOR PROCEDURE FOR PURPOSES OTHER THAN REMEDYING HEALTH STATE, UNSPECIFIED: ICD-10-CM

## 2021-04-08 PROCEDURE — ? ADDITIONAL NOTES

## 2021-04-08 NOTE — PROCEDURE: ADDITIONAL NOTES
Additional Notes: Patient was referred by Dr. Duran who had already treated some of her spots recently. She had a sick  who she had to take care of and has neglected to take care of herself. Her  passed and she has since rekindled with a high school “crush” and who she is going on a trip with April 22nd. She is looking to start taking care of herself and revamp her look. She wants laser treatments and a new skin care routine. She is not interested in Botox, filler, surgery, etc. She would like to get done what she can before the upcoming trip but would also like to continue afterwards. She [place] and has had significant sun damage. She would eventually like her drooping eyelids treated eventually. Daphnie thinks a “sprint” for best results by end of April would be a BBL and she can continue with Fraxel later. She only wears sunscreen that is in her moisturizer as of recently. She has no prior laser treatments other than what Dr. Duran has done and she is not sure what that was, no history of cold sores. Daphnie discussed her current skin care routine and suggested any adjustments she would make. She got Elta foaming cleanser, am therapy and elta sunscreen. She was quoted for 3 treatments of BBL.
Detail Level: Simple
Render Risk Assessment In Note?: no

## 2021-04-09 ENCOUNTER — APPOINTMENT (RX ONLY)
Dept: URBAN - METROPOLITAN AREA CLINIC 20 | Facility: CLINIC | Age: 81
Setting detail: DERMATOLOGY
End: 2021-04-09

## 2021-04-09 DIAGNOSIS — Z41.9 ENCOUNTER FOR PROCEDURE FOR PURPOSES OTHER THAN REMEDYING HEALTH STATE, UNSPECIFIED: ICD-10-CM

## 2021-04-09 LAB
BACTERIA UR CULT: NORMAL
SIGNIFICANT IND 70042: NORMAL
SITE SITE: NORMAL
SOURCE SOURCE: NORMAL

## 2021-04-09 PROCEDURE — ? SCITON BBL

## 2021-04-09 ASSESSMENT — LOCATION DETAILED DESCRIPTION DERM
LOCATION DETAILED: INFERIOR MID FOREHEAD
LOCATION DETAILED: RIGHT INFERIOR CENTRAL MALAR CHEEK
LOCATION DETAILED: LEFT CHIN
LOCATION DETAILED: LEFT INFERIOR CENTRAL MALAR CHEEK

## 2021-04-09 ASSESSMENT — LOCATION SIMPLE DESCRIPTION DERM
LOCATION SIMPLE: LEFT CHEEK
LOCATION SIMPLE: RIGHT CHEEK
LOCATION SIMPLE: INFERIOR FOREHEAD
LOCATION SIMPLE: CHIN

## 2021-04-09 ASSESSMENT — LOCATION ZONE DERM: LOCATION ZONE: FACE

## 2021-04-09 NOTE — PROCEDURE: SCITON BBL
Repetition Rate (Hz): 10
Preprocedure Text: The treatment areas were thoroughly cleaned. Any exposed at risk hair that was not to be treated was covered in white paper tape. Clear ultrasound gel was applied to the treatment area. The area was treated with no immediate stacking of pulses.
Cooling (In C): 25
Treatment Number: 1
Cooling (In C): 15
Post Procedure Text: The patient tolerated the procedure well. Ice-chilled washclothes were applied to the treatment area for comfort. Post care was reviewed with the patient.
Spot Size: Finesse Adapter Size: 15 x 45 mm (No Finesse Adapter)
Fluence (J/Cm2): 12
Additional Comments (Optional): same settings using 15x15
Cooling ?: Yes
Fluence (J/Cm2): 8
Pulse Duration: 20
Detail Level: Zone
Location Override (Will Not Show Above If Text Entered): cheek spot treat
Pulse Duration Units: milliseconds
Price (Use Numbers Only, No Special Characters Or $): 037
Consent: Written consent obtained, risks reviewed including but not limited to crusting, scabbing, blistering, scarring, darker or lighter pigmentary change, bruising, and/or incomplete response.
Fluence (J/Cm2): 21
Location Override (Will Not Show Above If Text Entered): full face spot treat
Post-Care Instructions: I reviewed with the patient in detail post-care instructions. Patient should stay away from the sun and wear sun protection until treated areas are fully healed.
Spot Size: Finesse Adapter Size: 7 mm round
Indication Override (Will Not Show Above If Text Entered): vascular
Anesthesia Volume In Cc: 0
Hide Repetition Rate?: No

## 2021-04-21 ENCOUNTER — APPOINTMENT (RX ONLY)
Dept: URBAN - METROPOLITAN AREA CLINIC 4 | Facility: CLINIC | Age: 81
Setting detail: DERMATOLOGY
End: 2021-04-21

## 2021-04-21 DIAGNOSIS — T81.89 OTHER COMPLICATIONS OF PROCEDURES, NOT ELSEWHERE CLASSIFIED: ICD-10-CM

## 2021-04-21 DIAGNOSIS — L24 IRRITANT CONTACT DERMATITIS: ICD-10-CM

## 2021-04-21 PROBLEM — L24.9 IRRITANT CONTACT DERMATITIS, UNSPECIFIED CAUSE: Status: ACTIVE | Noted: 2021-04-21

## 2021-04-21 PROBLEM — T81.89XA OTHER COMPLICATIONS OF PROCEDURES, NOT ELSEWHERE CLASSIFIED, INITIAL ENCOUNTER: Status: ACTIVE | Noted: 2021-04-21

## 2021-04-21 PROBLEM — D48.5 NEOPLASM OF UNCERTAIN BEHAVIOR OF SKIN: Status: ACTIVE | Noted: 2021-04-21

## 2021-04-21 PROCEDURE — ? PRESCRIPTION

## 2021-04-21 PROCEDURE — ? ADDITIONAL NOTES

## 2021-04-21 PROCEDURE — ? BIOPSY BY SHAVE METHOD

## 2021-04-21 PROCEDURE — 99213 OFFICE O/P EST LOW 20 MIN: CPT | Mod: 25

## 2021-04-21 PROCEDURE — ? DIAGNOSIS COMMENT

## 2021-04-21 PROCEDURE — 69100 BIOPSY OF EXTERNAL EAR: CPT

## 2021-04-21 PROCEDURE — ? MEDICATION COUNSELING

## 2021-04-21 RX ORDER — TRIAMCINOLONE ACETONIDE 1 MG/G
1 CREAM TOPICAL BID
Qty: 1 | Refills: 1 | Status: ERX

## 2021-04-21 RX ORDER — MUPIROCIN 20 MG/G
OINTMENT TOPICAL
Qty: 1 | Refills: 1 | Status: ERX | COMMUNITY
Start: 2021-04-21

## 2021-04-21 RX ADMIN — MUPIROCIN 1: 20 OINTMENT TOPICAL at 00:00

## 2021-04-21 ASSESSMENT — LOCATION SIMPLE DESCRIPTION DERM
LOCATION SIMPLE: RIGHT PRETIBIAL REGION
LOCATION SIMPLE: LEFT PRETIBIAL REGION

## 2021-04-21 ASSESSMENT — LOCATION DETAILED DESCRIPTION DERM
LOCATION DETAILED: LEFT DISTAL PRETIBIAL REGION
LOCATION DETAILED: RIGHT DISTAL PRETIBIAL REGION

## 2021-04-21 ASSESSMENT — LOCATION ZONE DERM: LOCATION ZONE: LEG

## 2021-04-21 NOTE — PROCEDURE: ADDITIONAL NOTES
Detail Level: Detailed
Render Risk Assessment In Note?: no
Additional Notes: ISK treated by cryotherapy on 3/31/21

## 2021-04-21 NOTE — PROCEDURE: BIOPSY BY SHAVE METHOD
Detail Level: Detailed
Depth Of Biopsy: dermis
Was A Bandage Applied: Yes
Size Of Lesion In Cm: 0.6
X Size Of Lesion In Cm: 0
Additional Anticipated Plans: If malignant consider Mohs surgery
Biopsy Type: H and E
Biopsy Method: Personna blade
Anesthesia Type: 1% lidocaine with 1:100,000 epinephrine and a 1:10 solution of 8.4% sodium bicarbonate
Anesthesia Volume In Cc: 1
Hemostasis: Aluminum Chloride
Wound Care: Petrolatum
Dressing: bandage
Destruction After The Procedure: No
Type Of Destruction Used: Cryotherapy
Curettage Text: The wound bed was treated with curettage after the biopsy was performed.
Cryotherapy Text: The wound bed was treated with cryotherapy after the biopsy was performed.
Electrodesiccation Text: The wound bed was treated with electrodesiccation after the biopsy was performed.
Electrodesiccation And Curettage Text: The wound bed was treated with electrodesiccation and curettage after the biopsy was performed.
Silver Nitrate Text: The wound bed was treated with silver nitrate after the biopsy was performed.
Lab: [id number]
Lab Facility: 
Consent: Written consent was obtained and risks were reviewed including but not limited to scarring, infection, bleeding, scabbing, incomplete removal, nerve damage and allergy to anesthesia.
Post-Care Instructions: I reviewed with the patient in detail post-care instructions. Patient is to keep the biopsy site dry overnight, and then apply bacitracin/petroleum twice daily until healed.
Notification Instructions: Patient will be notified of biopsy results. However, patient instructed to call the office if not contacted within 2 weeks.
Billing Type: Third-Party Bill
Information: Selecting Yes will display possible errors in your note based on the variables you have selected. This validation is only offered as a suggestion for you. PLEASE NOTE THAT THE VALIDATION TEXT WILL BE REMOVED WHEN YOU FINALIZE YOUR NOTE. IF YOU WANT TO FAX A PRELIMINARY NOTE YOU WILL NEED TO TOGGLE THIS TO 'NO' IF YOU DO NOT WANT IT IN YOUR FAXED NOTE.

## 2021-04-21 NOTE — HPI: SKIN LESIONS
Is This A New Presentation, Or A Follow-Up?: Skin Lesions
How Severe Is Your Skin Lesion?: mild
Have Your Skin Lesions Been Treated?: been treated
When Was It Treated?: 3/31/21 the lesion one the right lower leg was treated

## 2021-04-21 NOTE — PROCEDURE: MEDICATION COUNSELING
Colchicine Counseling:  Patient counseled regarding adverse effects including but not limited to stomach upset (nausea, vomiting, stomach pain, or diarrhea).  Patient instructed to limit alcohol consumption while taking this medication.  Colchicine may reduce blood counts especially with prolonged use.  The patient understands that monitoring of kidney function and blood counts may be required, especially at baseline. The patient verbalized understanding of the proper use and possible adverse effects of colchicine.  All of the patient's questions and concerns were addressed.
Thalidomide Pregnancy And Lactation Text: This medication is Pregnancy Category X and is absolutely contraindicated during pregnancy. It is unknown if it is excreted in breast milk.
Ilumya Pregnancy And Lactation Text: The risk during pregnancy and breastfeeding is uncertain with this medication.
Albendazole Counseling:  I discussed with the patient the risks of albendazole including but not limited to cytopenia, kidney damage, nausea/vomiting and severe allergy.  The patient understands that this medication is being used in an off-label manner.
Doxycycline Counseling:  Patient counseled regarding possible photosensitivity and increased risk for sunburn.  Patient instructed to avoid sunlight, if possible.  When exposed to sunlight, patients should wear protective clothing, sunglasses, and sunscreen.  The patient was instructed to call the office immediately if the following severe adverse effects occur:  hearing changes, easy bruising/bleeding, severe headache, or vision changes.  The patient verbalized understanding of the proper use and possible adverse effects of doxycycline.  All of the patient's questions and concerns were addressed.
Fluconazole Counseling:  Patient counseled regarding adverse effects of fluconazole including but not limited to headache, diarrhea, nausea, upset stomach, liver function test abnormalities, taste disturbance, and stomach pain.  There is a rare possibility of liver failure that can occur when taking fluconazole.  The patient understands that monitoring of LFTs and kidney function test may be required, especially at baseline. The patient verbalized understanding of the proper use and possible adverse effects of fluconazole.  All of the patient's questions and concerns were addressed.
Azathioprine Counseling:  I discussed with the patient the risks of azathioprine including but not limited to myelosuppression, immunosuppression, hepatotoxicity, lymphoma, and infections.  The patient understands that monitoring is required including baseline LFTs, Creatinine, possible TPMP genotyping and weekly CBCs for the first month and then every 2 weeks thereafter.  The patient verbalized understanding of the proper use and possible adverse effects of azathioprine.  All of the patient's questions and concerns were addressed.
Azathioprine Pregnancy And Lactation Text: This medication is Pregnancy Category D and isn't considered safe during pregnancy. It is unknown if this medication is excreted in breast milk.
Clindamycin Pregnancy And Lactation Text: This medication can be used in pregnancy if certain situations. Clindamycin is also present in breast milk.
Tazorac Counseling:  Patient advised that medication is irritating and drying.  Patient may need to apply sparingly and wash off after an hour before eventually leaving it on overnight.  The patient verbalized understanding of the proper use and possible adverse effects of tazorac.  All of the patient's questions and concerns were addressed.
Hydroquinone Counseling:  Patient advised that medication may result in skin irritation, lightening (hypopigmentation), dryness, and burning.  In the event of skin irritation, the patient was advised to reduce the amount of the drug applied or use it less frequently.  Rarely, spots that are treated with hydroquinone can become darker (pseudoochronosis).  Should this occur, patient instructed to stop medication and call the office. The patient verbalized understanding of the proper use and possible adverse effects of hydroquinone.  All of the patient's questions and concerns were addressed.
High Dose Vitamin A Counseling: Side effects reviewed, pt to contact office should one occur.
Niacinamide Pregnancy And Lactation Text: These medications are considered safe during pregnancy.
High Dose Vitamin A Pregnancy And Lactation Text: High dose vitamin A therapy is contraindicated during pregnancy and breast feeding.
Infliximab Counseling:  I discussed with the patient the risks of infliximab including but not limited to myelosuppression, immunosuppression, autoimmune hepatitis, demyelinating diseases, lymphoma, and serious infections.  The patient understands that monitoring is required including a PPD at baseline and must alert us or the primary physician if symptoms of infection or other concerning signs are noted.
Doxycycline Pregnancy And Lactation Text: This medication is Pregnancy Category D and not consider safe during pregnancy. It is also excreted in breast milk but is considered safe for shorter treatment courses.
Fluconazole Pregnancy And Lactation Text: This medication is Pregnancy Category C and it isn't know if it is safe during pregnancy. It is also excreted in breast milk.
Cellcept Counseling:  I discussed with the patient the risks of mycophenolate mofetil including but not limited to infection/immunosuppression, GI upset, hypokalemia, hypercholesterolemia, bone marrow suppression, lymphoproliferative disorders, malignancy, GI ulceration/bleed/perforation, colitis, interstitial lung disease, kidney failure, progressive multifocal leukoencephalopathy, and birth defects.  The patient understands that monitoring is required including a baseline creatinine and regular CBC testing. In addition, patient must alert us immediately if symptoms of infection or other concerning signs are noted.
Colchicine Pregnancy And Lactation Text: This medication is Pregnancy Category C and isn't considered safe during pregnancy. It is excreted in breast milk.
Nsaids Counseling: NSAID Counseling: I discussed with the patient that NSAIDs should be taken with food. Prolonged use of NSAIDs can result in the development of stomach ulcers.  Patient advised to stop taking NSAIDs if abdominal pain occurs.  The patient verbalized understanding of the proper use and possible adverse effects of NSAIDs.  All of the patient's questions and concerns were addressed.
Albendazole Pregnancy And Lactation Text: This medication is Pregnancy Category C and it isn't known if it is safe during pregnancy. It is also excreted in breast milk.
Tazorac Pregnancy And Lactation Text: This medication is not safe during pregnancy. It is unknown if this medication is excreted in breast milk.
Hydroquinone Pregnancy And Lactation Text: This medication has not been assigned a Pregnancy Risk Category but animal studies failed to show danger with the topical medication. It is unknown if the medication is excreted in breast milk.
Griseofulvin Counseling:  I discussed with the patient the risks of griseofulvin including but not limited to photosensitivity, cytopenia, liver damage, nausea/vomiting and severe allergy.  The patient understands that this medication is best absorbed when taken with a fatty meal (e.g., ice cream or french fries).
Topical Clindamycin Counseling: Patient counseled that this medication may cause skin irritation or allergic reactions.  In the event of skin irritation, the patient was advised to reduce the amount of the drug applied or use it less frequently.   The patient verbalized understanding of the proper use and possible adverse effects of clindamycin.  All of the patient's questions and concerns were addressed.
Imiquimod Counseling:  I discussed with the patient the risks of imiquimod including but not limited to erythema, scaling, itching, weeping, crusting, and pain.  Patient understands that the inflammatory response to imiquimod is variable from person to person and was educated regarded proper titration schedule.  If flu-like symptoms develop, patient knows to discontinue the medication and contact us.
Erythromycin Counseling:  I discussed with the patient the risks of erythromycin including but not limited to GI upset, allergic reaction, drug rash, diarrhea, increase in liver enzymes, and yeast infections.
Nsaids Pregnancy And Lactation Text: These medications are considered safe up to 30 weeks gestation. It is excreted in breast milk.
Infliximab Pregnancy And Lactation Text: This medication is Pregnancy Category B and is considered safe during pregnancy. It is unknown if this medication is excreted in breast milk.
Tranexamic Acid Counseling:  Patient advised of the small risk of bleeding problems with tranexamic acid. They were also instructed to call if they developed any nausea, vomiting or diarrhea. All of the patient's questions and concerns were addressed.
Ivermectin Counseling:  Patient instructed to take medication on an empty stomach with a full glass of water.  Patient informed of potential adverse effects including but not limited to nausea, diarrhea, dizziness, itching, and swelling of the extremities or lymph nodes.  The patient verbalized understanding of the proper use and possible adverse effects of ivermectin.  All of the patient's questions and concerns were addressed.
Dapsone Counseling: I discussed with the patient the risks of dapsone including but not limited to hemolytic anemia, agranulocytosis, rashes, methemoglobinemia, kidney failure, peripheral neuropathy, headaches, GI upset, and liver toxicity.  Patients who start dapsone require monitoring including baseline LFTs and weekly CBCs for the first month, then every month thereafter.  The patient verbalized understanding of the proper use and possible adverse effects of dapsone.  All of the patient's questions and concerns were addressed.
Cyclophosphamide Counseling:  I discussed with the patient the risks of cyclophosphamide including but not limited to hair loss, hormonal abnormalities, decreased fertility, abdominal pain, diarrhea, nausea and vomiting, bone marrow suppression and infection. The patient understands that monitoring is required while taking this medication.
Dapsone Pregnancy And Lactation Text: This medication is Pregnancy Category C and is not considered safe during pregnancy or breast feeding.
Erythromycin Pregnancy And Lactation Text: This medication is Pregnancy Category B and is considered safe during pregnancy. It is also excreted in breast milk.
Topical Clindamycin Pregnancy And Lactation Text: This medication is Pregnancy Category B and is considered safe during pregnancy. It is unknown if it is excreted in breast milk.
Imiquimod Pregnancy And Lactation Text: This medication is Pregnancy Category C. It is unknown if this medication is excreted in breast milk.
Odomzo Counseling- I discussed with the patient the risks of Odomzo including but not limited to nausea, vomiting, diarrhea, constipation, weight loss, changes in the sense of taste, decreased appetite, muscle spasms, and hair loss.  The patient verbalized understanding of the proper use and possible adverse effects of Odomzo.  All of the patient's questions and concerns were addressed.
Tranexamic Acid Pregnancy And Lactation Text: It is unknown if this medication is safe during pregnancy or breast feeding.
Rituxan Counseling:  I discussed with the patient the risks of Rituxan infusions. Side effects can include infusion reactions, severe drug rashes including mucocutaneous reactions, reactivation of latent hepatitis and other infections and rarely progressive multifocal leukoencephalopathy.  All of the patient's questions and concerns were addressed.
Griseofulvin Pregnancy And Lactation Text: This medication is Pregnancy Category X and is known to cause serious birth defects. It is unknown if this medication is excreted in breast milk but breast feeding should be avoided.
Metronidazole Counseling:  I discussed with the patient the risks of metronidazole including but not limited to seizures, nausea/vomiting, a metallic taste in the mouth, nausea/vomiting and severe allergy.
Benzoyl Peroxide Counseling: Patient counseled that medicine may cause skin irritation and bleach clothing.  In the event of skin irritation, the patient was advised to reduce the amount of the drug applied or use it less frequently.   The patient verbalized understanding of the proper use and possible adverse effects of benzoyl peroxide.  All of the patient's questions and concerns were addressed.
Siliq Counseling:  I discussed with the patient the risks of Siliq including but not limited to new or worsening depression, suicidal thoughts and behavior, immunosuppression, malignancy, posterior leukoencephalopathy syndrome, and serious infections.  The patient understands that monitoring is required including a PPD at baseline and must alert us or the primary physician if symptoms of infection or other concerning signs are noted. There is also a special program designed to monitor depression which is required with Siliq.
Valtrex Counseling: I discussed with the patient the risks of valacyclovir including but not limited to kidney damage, nausea, vomiting and severe allergy.  The patient understands that if the infection seems to be worsening or is not improving, they are to call.
Cyclophosphamide Pregnancy And Lactation Text: This medication is Pregnancy Category D and it isn't considered safe during pregnancy. This medication is excreted in breast milk.
Erivedge Counseling- I discussed with the patient the risks of Erivedge including but not limited to nausea, vomiting, diarrhea, constipation, weight loss, changes in the sense of taste, decreased appetite, muscle spasms, and hair loss.  The patient verbalized understanding of the proper use and possible adverse effects of Erivedge.  All of the patient's questions and concerns were addressed.
Topical Sulfur Applications Counseling: Topical Sulfur Counseling: Patient counseled that this medication may cause skin irritation or allergic reactions.  In the event of skin irritation, the patient was advised to reduce the amount of the drug applied or use it less frequently.   The patient verbalized understanding of the proper use and possible adverse effects of topical sulfur application.  All of the patient's questions and concerns were addressed.
Minoxidil Counseling: Minoxidil is a topical medication which can increase blood flow where it is applied. It is uncertain how this medication increases hair growth. Side effects are uncommon and include stinging and allergic reactions.
Topical Sulfur Applications Pregnancy And Lactation Text: This medication is Pregnancy Category C and has an unknown safety profile during pregnancy. It is unknown if this topical medication is excreted in breast milk.
Rituxan Pregnancy And Lactation Text: This medication is Pregnancy Category C and it isn't know if it is safe during pregnancy. It is unknown if this medication is excreted in breast milk but similar antibodies are known to be excreted.
Itraconazole Counseling:  I discussed with the patient the risks of itraconazole including but not limited to liver damage, nausea/vomiting, neuropathy, and severe allergy.  The patient understands that this medication is best absorbed when taken with acidic beverages such as non-diet cola or ginger ale.  The patient understands that monitoring is required including baseline LFTs and repeat LFTs at intervals.  The patient understands that they are to contact us or the primary physician if concerning signs are noted.
Detail Level: Zone
Valtrex Pregnancy And Lactation Text: this medication is Pregnancy Category B and is considered safe during pregnancy. This medication is not directly found in breast milk but it's metabolite acyclovir is present.
Cyclosporine Counseling:  I discussed with the patient the risks of cyclosporine including but not limited to hypertension, gingival hyperplasia,myelosuppression, immunosuppression, liver damage, kidney damage, neurotoxicity, lymphoma, and serious infections. The patient understands that monitoring is required including baseline blood pressure, CBC, CMP, lipid panel and uric acid, and then 1-2 times monthly CMP and blood pressure.
Benzoyl Peroxide Pregnancy And Lactation Text: This medication is Pregnancy Category C. It is unknown if benzoyl peroxide is excreted in breast milk.
Otezla Pregnancy And Lactation Text: This medication is Pregnancy Category C and it isn't known if it is safe during pregnancy. It is unknown if it is excreted in breast milk.
Ketoconazole Counseling:   Patient counseled regarding improving absorption with orange juice.  Adverse effects include but are not limited to breast enlargement, headache, diarrhea, nausea, upset stomach, liver function test abnormalities, taste disturbance, and stomach pain.  There is a rare possibility of liver failure that can occur when taking ketoconazole. The patient understands that monitoring of LFTs may be required, especially at baseline. The patient verbalized understanding of the proper use and possible adverse effects of ketoconazole.  All of the patient's questions and concerns were addressed.
Carac Counseling:  I discussed with the patient the risks of Carac including but not limited to erythema, scaling, itching, weeping, crusting, and pain.
Otezla Counseling: The side effects of Otezla were discussed with the patient, including but not limited to worsening or new depression, weight loss, diarrhea, nausea, upper respiratory tract infection, and headache. Patient instructed to call the office should any adverse effect occur.  The patient verbalized understanding of the proper use and possible adverse effects of Otezla.  All the patient's questions and concerns were addressed.
Finasteride Male Counseling: Finasteride Counseling:  I discussed with the patient the risks of use of finasteride including but not limited to decreased libido, decreased ejaculate volume, gynecomastia, and depression. Women should not handle medication.  All of the patient's questions and concerns were addressed.
Metronidazole Pregnancy And Lactation Text: This medication is Pregnancy Category B and considered safe during pregnancy.  It is also excreted in breast milk.
Include Pregnancy/Lactation Warning?: No
Cyclosporine Pregnancy And Lactation Text: This medication is Pregnancy Category C and it isn't know if it is safe during pregnancy. This medication is excreted in breast milk.
Oxybutynin Counseling:  I discussed with the patient the risks of oxybutynin including but not limited to skin rash, drowsiness, dry mouth, difficulty urinating, and blurred vision.
Cimzia Counseling:  I discussed with the patient the risks of Cimzia including but not limited to immunosuppression, allergic reactions and infections.  The patient understands that monitoring is required including a PPD at baseline and must alert us or the primary physician if symptoms of infection or other concerning signs are noted.
Stelara Counseling:  I discussed with the patient the risks of ustekinumab including but not limited to immunosuppression, malignancy, posterior leukoencephalopathy syndrome, and serious infections.  The patient understands that monitoring is required including a PPD at baseline and must alert us or the primary physician if symptoms of infection or other concerning signs are noted.
Wartpeel Counseling:  I discussed with the patient the risks of Wartpeel including but not limited to erythema, scaling, itching, weeping, crusting, and pain.
Simponi Counseling:  I discussed with the patient the risks of golimumab including but not limited to myelosuppression, immunosuppression, autoimmune hepatitis, demyelinating diseases, lymphoma, and serious infections.  The patient understands that monitoring is required including a PPD at baseline and must alert us or the primary physician if symptoms of infection or other concerning signs are noted.
Mirvaso Counseling: Mirvaso is a topical medication which can decrease superficial blood flow where applied. Side effects are uncommon and include stinging, redness and allergic reactions.
Wartpeel Pregnancy And Lactation Text: This medication is Pregnancy Category X and contraindicated in pregnancy and in women who may become pregnant. It is unknown if this medication is excreted in breast milk.
Mirvaso Pregnancy And Lactation Text: This medication has not been assigned a Pregnancy Risk Category. It is unknown if the medication is excreted in breast milk.
Minocycline Counseling: Patient advised regarding possible photosensitivity and discoloration of the teeth, skin, lips, tongue and gums.  Patient instructed to avoid sunlight, if possible.  When exposed to sunlight, patients should wear protective clothing, sunglasses, and sunscreen.  The patient was instructed to call the office immediately if the following severe adverse effects occur:  hearing changes, easy bruising/bleeding, severe headache, or vision changes.  The patient verbalized understanding of the proper use and possible adverse effects of minocycline.  All of the patient's questions and concerns were addressed.
Finasteride Pregnancy And Lactation Text: This medication is absolutely contraindicated during pregnancy. It is unknown if it is excreted in breast milk.
Gabapentin Counseling: I discussed with the patient the risks of gabapentin including but not limited to dizziness, somnolence, fatigue and ataxia.
Cimzia Pregnancy And Lactation Text: This medication crosses the placenta but can be considered safe in certain situations. Cimzia may be excreted in breast milk.
Terbinafine Counseling: Patient counseling regarding adverse effects of terbinafine including but not limited to headache, diarrhea, rash, upset stomach, liver function test abnormalities, itching, taste/smell disturbance, nausea, abdominal pain, and flatulence.  There is a rare possibility of liver failure that can occur when taking terbinafine.  The patient understands that a baseline LFT and kidney function test may be required. The patient verbalized understanding of the proper use and possible adverse effects of terbinafine.  All of the patient's questions and concerns were addressed.
Methotrexate Counseling:  Patient counseled regarding adverse effects of methotrexate including but not limited to nausea, vomiting, abnormalities in liver function tests. Patients may develop mouth sores, rash, diarrhea, and abnormalities in blood counts. The patient understands that monitoring is required including LFT's and blood counts.  There is a rare possibility of scarring of the liver and lung problems that can occur when taking methotrexate. Persistent nausea, loss of appetite, pale stools, dark urine, cough, and shortness of breath should be reported immediately. Patient advised to discontinue methotrexate treatment at least three months before attempting to become pregnant.  I discussed the need for folate supplements while taking methotrexate.  These supplements can decrease side effects during methotrexate treatment. The patient verbalized understanding of the proper use and possible adverse effects of methotrexate.  All of the patient's questions and concerns were addressed.
Quinolones Counseling:  I discussed with the patient the risks of fluoroquinolones including but not limited to GI upset, allergic reaction, drug rash, diarrhea, dizziness, photosensitivity, yeast infections, liver function test abnormalities, tendonitis/tendon rupture.
Methotrexate Pregnancy And Lactation Text: This medication is Pregnancy Category X and is known to cause fetal harm. This medication is excreted in breast milk.
Minocycline Pregnancy And Lactation Text: This medication is Pregnancy Category D and not consider safe during pregnancy. It is also excreted in breast milk.
Taltz Counseling: I discussed with the patient the risks of ixekizumab including but not limited to immunosuppression, serious infections, worsening of inflammatory bowel disease and drug reactions.  The patient understands that monitoring is required including a PPD at baseline and must alert us or the primary physician if symptoms of infection or other concerning signs are noted.
Zyclara Counseling:  I discussed with the patient the risks of imiquimod including but not limited to erythema, scaling, itching, weeping, crusting, and pain.  Patient understands that the inflammatory response to imiquimod is variable from person to person and was educated regarded proper titration schedule.  If flu-like symptoms develop, patient knows to discontinue the medication and contact us.
Ketoconazole Pregnancy And Lactation Text: This medication is Pregnancy Category C and it isn't know if it is safe during pregnancy. It is also excreted in breast milk and breast feeding isn't recommended.
Picato Counseling:  I discussed with the patient the risks of Picato including but not limited to erythema, scaling, itching, weeping, crusting, and pain.
Calcipotriene Counseling:  I discussed with the patient the risks of calcipotriene including but not limited to erythema, scaling, itching, and irritation.
Skyrizi Counseling: I discussed with the patient the risks of risankizumab-rzaa including but not limited to immunosuppression, and serious infections.  The patient understands that monitoring is required including a PPD at baseline and must alert us or the primary physician if symptoms of infection or other concerning signs are noted.
Terbinafine Pregnancy And Lactation Text: This medication is Pregnancy Category B and is considered safe during pregnancy. It is also excreted in breast milk and breast feeding isn't recommended.
Calcipotriene Pregnancy And Lactation Text: This medication has not been proven safe during pregnancy. It is unknown if this medication is excreted in breast milk.
Prednisone Counseling:  I discussed with the patient the risks of prolonged use of prednisone including but not limited to weight gain, insomnia, osteoporosis, mood changes, diabetes, susceptibility to infection, glaucoma and high blood pressure.  In cases where prednisone use is prolonged, patients should be monitored with blood pressure checks, serum glucose levels and an eye exam.  Additionally, the patient may need to be placed on GI prophylaxis, PCP prophylaxis, and calcium and vitamin D supplementation and/or a bisphosphonate.  The patient verbalized understanding of the proper use and the possible adverse effects of prednisone.  All of the patient's questions and concerns were addressed.
Cosentyx Counseling:  I discussed with the patient the risks of Cosentyx including but not limited to worsening of Crohn's disease, immunosuppression, allergic reactions and infections.  The patient understands that monitoring is required including a PPD at baseline and must alert us or the primary physician if symptoms of infection or other concerning signs are noted.
Propranolol Counseling:  I discussed with the patient the risks of propranolol including but not limited to low heart rate, low blood pressure, low blood sugar, restlessness and increased cold sensitivity. They should call the office if they experience any of these side effects.
Protopic Counseling: Patient may experience a mild burning sensation during topical application. Protopic is not approved in children less than 2 years of age. There have been case reports of hematologic and skin malignancies in patients using topical calcineurin inhibitors although causality is questionable.
Azithromycin Counseling:  I discussed with the patient the risks of azithromycin including but not limited to GI upset, allergic reaction, drug rash, diarrhea, and yeast infections.
Rifampin Counseling: I discussed with the patient the risks of rifampin including but not limited to liver damage, kidney damage, red-orange body fluids, nausea/vomiting and severe allergy.
Tremfya Counseling: I discussed with the patient the risks of guselkumab including but not limited to immunosuppression, serious infections, worsening of inflammatory bowel disease and drug reactions.  The patient understands that monitoring is required including a PPD at baseline and must alert us or the primary physician if symptoms of infection or other concerning signs are noted.
Propranolol Pregnancy And Lactation Text: This medication is Pregnancy Category C and it isn't known if it is safe during pregnancy. It is excreted in breast milk.
5-Fu Counseling: 5-Fluorouracil Counseling:  I discussed with the patient the risks of 5-fluorouracil including but not limited to erythema, scaling, itching, weeping, crusting, and pain.
Glycopyrrolate Counseling:  I discussed with the patient the risks of glycopyrrolate including but not limited to skin rash, drowsiness, dry mouth, difficulty urinating, and blurred vision.
Dupixent Counseling: I discussed with the patient the risks of dupilumab including but not limited to eye infection and irritation, cold sores, injection site reactions, worsening of asthma, allergic reactions and increased risk of parasitic infection.  Live vaccines should be avoided while taking dupilumab. Dupilumab will also interact with certain medications such as warfarin and cyclosporine. The patient understands that monitoring is required and they must alert us or the primary physician if symptoms of infection or other concerning signs are noted.
Azithromycin Pregnancy And Lactation Text: This medication is considered safe during pregnancy and is also secreted in breast milk.
Glycopyrrolate Pregnancy And Lactation Text: This medication is Pregnancy Category B and is considered safe during pregnancy. It is unknown if it is excreted breast milk.
Opioid Counseling: I discussed with the patient the potential side effects of opioids including but not limited to addiction, altered mental status, and depression. I stressed avoiding alcohol, benzodiazepines, muscle relaxants and sleep aids unless specifically okayed by a physician. The patient verbalized understanding of the proper use and possible adverse effects of opioids. All of the patient's questions and concerns were addressed. They were instructed to flush the remaining pills down the toilet if they did not need them for pain.
Protopic Pregnancy And Lactation Text: This medication is Pregnancy Category C. It is unknown if this medication is excreted in breast milk when applied topically.
Rifampin Pregnancy And Lactation Text: This medication is Pregnancy Category C and it isn't know if it is safe during pregnancy. It is also excreted in breast milk and should not be used if you are breast feeding.
Birth Control Pills Counseling: Birth Control Pill Counseling: I discussed with the patient the potential side effects of OCPs including but not limited to increased risk of stroke, heart attack, thrombophlebitis, deep venous thrombosis, hepatic adenomas, breast changes, GI upset, headaches, and depression.  The patient verbalized understanding of the proper use and possible adverse effects of OCPs. All of the patient's questions and concerns were addressed.
Cimetidine Counseling:  I discussed with the patient the risks of Cimetidine including but not limited to gynecomastia, headache, diarrhea, nausea, drowsiness, arrhythmias, pancreatitis, skin rashes, psychosis, bone marrow suppression and kidney toxicity.
Opioid Pregnancy And Lactation Text: These medications can lead to premature delivery and should be avoided during pregnancy. These medications are also present in breast milk in small amounts.
Bactrim Counseling:  I discussed with the patient the risks of sulfa antibiotics including but not limited to GI upset, allergic reaction, drug rash, diarrhea, dizziness, photosensitivity, and yeast infections.  Rarely, more serious reactions can occur including but not limited to aplastic anemia, agranulocytosis, methemoglobinemia, blood dyscrasias, liver or kidney failure, lung infiltrates or desquamative/blistering drug rashes.
Rhofade Counseling: Rhofade is a topical medication which can decrease superficial blood flow where applied. Side effects are uncommon and include stinging, redness and allergic reactions.
Drysol Counseling:  I discussed with the patient the risks of drysol/aluminum chloride including but not limited to skin rash, itching, irritation, burning.
Hydroxychloroquine Counseling:  I discussed with the patient that a baseline ophthalmologic exam is needed at the start of therapy and every year thereafter while on therapy. A CBC may also be warranted for monitoring.  The side effects of this medication were discussed with the patient, including but not limited to agranulocytosis, aplastic anemia, seizures, rashes, retinopathy, and liver toxicity. Patient instructed to call the office should any adverse effect occur.  The patient verbalized understanding of the proper use and possible adverse effects of Plaquenil.  All the patient's questions and concerns were addressed.
Dupixent Pregnancy And Lactation Text: This medication likely crosses the placenta but the risk for the fetus is uncertain. This medication is excreted in breast milk.
Birth Control Pills Pregnancy And Lactation Text: This medication should be avoided if pregnant and for the first 30 days post-partum.
Acitretin Counseling:  I discussed with the patient the risks of acitretin including but not limited to hair loss, dry lips/skin/eyes, liver damage, hyperlipidemia, depression/suicidal ideation, photosensitivity.  Serious rare side effects can include but are not limited to pancreatitis, pseudotumor cerebri, bony changes, clot formation/stroke/heart attack.  Patient understands that alcohol is contraindicated since it can result in liver toxicity and significantly prolong the elimination of the drug by many years.
Xeljanz Counseling: I discussed with the patient the risks of Xeljanz therapy including increased risk of infection, liver issues, headache, diarrhea, or cold symptoms. Live vaccines should be avoided. They were instructed to call if they have any problems.
Xeltheaz Pregnancy And Lactation Text: This medication is Pregnancy Category D and is not considered safe during pregnancy.  The risk during breast feeding is also uncertain.
Acitretin Pregnancy And Lactation Text: This medication is Pregnancy Category X and should not be given to women who are pregnant or may become pregnant in the future. This medication is excreted in breast milk.
Bactrim Pregnancy And Lactation Text: This medication is Pregnancy Category D and is known to cause fetal risk.  It is also excreted in breast milk.
Drysol Pregnancy And Lactation Text: This medication is considered safe during pregnancy and breast feeding.
Spironolactone Pregnancy And Lactation Text: This medication can cause feminization of the male fetus and should be avoided during pregnancy. The active metabolite is also found in breast milk.
Hydroxychloroquine Pregnancy And Lactation Text: This medication has been shown to cause fetal harm but it isn't assigned a Pregnancy Risk Category. There are small amounts excreted in breast milk.
Elidel Counseling: Patient may experience a mild burning sensation during topical application. Elidel is not approved in children less than 2 years of age. There have been case reports of hematologic and skin malignancies in patients using topical calcineurin inhibitors although causality is questionable.
Doxepin Counseling:  Patient advised that the medication is sedating and not to drive a car after taking this medication. Patient informed of potential adverse effects including but not limited to dry mouth, urinary retention, and blurry vision.  The patient verbalized understanding of the proper use and possible adverse effects of doxepin.  All of the patient's questions and concerns were addressed.
Spironolactone Counseling: Patient advised regarding risks of diarrhea, abdominal pain, hyperkalemia, birth defects (for female patients), liver toxicity and renal toxicity. The patient may need blood work to monitor liver and kidney function and potassium levels while on therapy. The patient verbalized understanding of the proper use and possible adverse effects of spironolactone.  All of the patient's questions and concerns were addressed.
Libtayo Counseling- I discussed with the patient the risks of Libtayo including but not limited to nausea, vomiting, diarrhea, and bone or muscle pain.  The patient verbalized understanding of the proper use and possible adverse effects of Libtayo.  All of the patient's questions and concerns were addressed.
Enbrel Counseling:  I discussed with the patient the risks of etanercept including but not limited to myelosuppression, immunosuppression, autoimmune hepatitis, demyelinating diseases, lymphoma, and infections.  The patient understands that monitoring is required including a PPD at baseline and must alert us or the primary physician if symptoms of infection or other concerning signs are noted.
Sarecycline Counseling: Patient advised regarding possible photosensitivity and discoloration of the teeth, skin, lips, tongue and gums.  Patient instructed to avoid sunlight, if possible.  When exposed to sunlight, patients should wear protective clothing, sunglasses, and sunscreen.  The patient was instructed to call the office immediately if the following severe adverse effects occur:  hearing changes, easy bruising/bleeding, severe headache, or vision changes.  The patient verbalized understanding of the proper use and possible adverse effects of sarecycline.  All of the patient's questions and concerns were addressed.
Cephalexin Counseling: I counseled the patient regarding use of cephalexin as an antibiotic for prophylactic and/or therapeutic purposes. Cephalexin (commonly prescribed under brand name Keflex) is a cephalosporin antibiotic which is active against numerous classes of bacteria, including most skin bacteria. Side effects may include nausea, diarrhea, gastrointestinal upset, rash, hives, yeast infections, and in rare cases, hepatitis, kidney disease, seizures, fever, confusion, neurologic symptoms, and others. Patients with severe allergies to penicillin medications are cautioned that there is about a 10% incidence of cross-reactivity with cephalosporins. When possible, patients with penicillin allergies should use alternatives to cephalosporins for antibiotic therapy.
Bexarotene Counseling:  I discussed with the patient the risks of bexarotene including but not limited to hair loss, dry lips/skin/eyes, liver abnormalities, hyperlipidemia, pancreatitis, depression/suicidal ideation, photosensitivity, drug rash/allergic reactions, hypothyroidism, anemia, leukopenia, infection, cataracts, and teratogenicity.  Patient understands that they will need regular blood tests to check lipid profile, liver function tests, white blood cell count, thyroid function tests and pregnancy test if applicable.
SSKI Counseling:  I discussed with the patient the risks of SSKI including but not limited to thyroid abnormalities, metallic taste, GI upset, fever, headache, acne, arthralgias, paraesthesias, lymphadenopathy, easy bleeding, arrhythmias, and allergic reaction.
Humira Counseling:  I discussed with the patient the risks of adalimumab including but not limited to myelosuppression, immunosuppression, autoimmune hepatitis, demyelinating diseases, lymphoma, and serious infections.  The patient understands that monitoring is required including a PPD at baseline and must alert us or the primary physician if symptoms of infection or other concerning signs are noted.
Xolair Counseling:  Patient informed of potential adverse effects including but not limited to fever, muscle aches, rash and allergic reactions.  The patient verbalized understanding of the proper use and possible adverse effects of Xolair.  All of the patient's questions and concerns were addressed.
Arava Counseling:  Patient counseled regarding adverse effects of Arava including but not limited to nausea, vomiting, abnormalities in liver function tests. Patients may develop mouth sores, rash, diarrhea, and abnormalities in blood counts. The patient understands that monitoring is required including LFTs and blood counts.  There is a rare possibility of scarring of the liver and lung problems that can occur when taking methotrexate. Persistent nausea, loss of appetite, pale stools, dark urine, cough, and shortness of breath should be reported immediately. Patient advised to discontinue Arava treatment and consult with a physician prior to attempting conception. The patient will have to undergo a treatment to eliminate Arava from the body prior to conception.
Solaraze Counseling:  I discussed with the patient the risks of Solaraze including but not limited to erythema, scaling, itching, weeping, crusting, and pain.
Solaraze Pregnancy And Lactation Text: This medication is Pregnancy Category B and is considered safe. There is some data to suggest avoiding during the third trimester. It is unknown if this medication is excreted in breast milk.
Doxepin Pregnancy And Lactation Text: This medication is Pregnancy Category C and it isn't known if it is safe during pregnancy. It is also excreted in breast milk and breast feeding isn't recommended.
Sski Pregnancy And Lactation Text: This medication is Pregnancy Category D and isn't considered safe during pregnancy. It is excreted in breast milk.
Bexarotene Pregnancy And Lactation Text: This medication is Pregnancy Category X and should not be given to women who are pregnant or may become pregnant. This medication should not be used if you are breast feeding.
Xolair Pregnancy And Lactation Text: This medication is Pregnancy Category B and is considered safe during pregnancy. This medication is excreted in breast milk.
Hydroxyzine Pregnancy And Lactation Text: This medication is not safe during pregnancy and should not be taken. It is also excreted in breast milk and breast feeding isn't recommended.
Libtayo Pregnancy And Lactation Text: This medication is contraindicated in pregnancy and when breast feeding.
Tetracycline Counseling: Patient counseled regarding possible photosensitivity and increased risk for sunburn.  Patient instructed to avoid sunlight, if possible.  When exposed to sunlight, patients should wear protective clothing, sunglasses, and sunscreen.  The patient was instructed to call the office immediately if the following severe adverse effects occur:  hearing changes, easy bruising/bleeding, severe headache, or vision changes.  The patient verbalized understanding of the proper use and possible adverse effects of tetracycline.  All of the patient's questions and concerns were addressed. Patient understands to avoid pregnancy while on therapy due to potential birth defects.
Isotretinoin Counseling: Patient should get monthly blood tests, not donate blood, not drive at night if vision affected, not share medication, and not undergo elective surgery for 6 months after tx completed. Side effects reviewed, pt to contact office should one occur.
Niacinamide Counseling: I recommended taking niacin or niacinamide, also know as vitamin B3, twice daily. Recent evidence suggests that taking vitamin B3 (500 mg twice daily) can reduce the risk of actinic keratoses and non-melanoma skin cancers. Side effects of vitamin B3 include flushing and headache.
Hydroxyzine Counseling: Patient advised that the medication is sedating and not to drive a car after taking this medication.  Patient informed of potential adverse effects including but not limited to dry mouth, urinary retention, and blurry vision.  The patient verbalized understanding of the proper use and possible adverse effects of hydroxyzine.  All of the patient's questions and concerns were addressed.
Clofazimine Counseling:  I discussed with the patient the risks of clofazimine including but not limited to skin and eye pigmentation, liver damage, nausea/vomiting, gastrointestinal bleeding and allergy.
Topical Retinoid counseling:  Patient advised to apply a pea-sized amount only at bedtime and wait 30 minutes after washing their face before applying.  If too drying, patient may add a non-comedogenic moisturizer. The patient verbalized understanding of the proper use and possible adverse effects of retinoids.  All of the patient's questions and concerns were addressed.
Cephalexin Pregnancy And Lactation Text: This medication is Pregnancy Category B and considered safe during pregnancy.  It is also excreted in breast milk but can be used safely for shorter doses.
Ilumya Counseling: I discussed with the patient the risks of tildrakizumab including but not limited to immunosuppression, malignancy, posterior leukoencephalopathy syndrome, and serious infections.  The patient understands that monitoring is required including a PPD at baseline and must alert us or the primary physician if symptoms of infection or other concerning signs are noted.
Eucrisa Counseling: Patient may experience a mild burning sensation during topical application. Eucrisa is not approved in children less than 2 years of age.
Clindamycin Counseling: I counseled the patient regarding use of clindamycin as an antibiotic for prophylactic and/or therapeutic purposes. Clindamycin is active against numerous classes of bacteria, including skin bacteria. Side effects may include nausea, diarrhea, gastrointestinal upset, rash, hives, yeast infections, and in rare cases, colitis.
Isotretinoin Pregnancy And Lactation Text: This medication is Pregnancy Category X and is considered extremely dangerous during pregnancy. It is unknown if it is excreted in breast milk.
Thalidomide Counseling: I discussed with the patient the risks of thalidomide including but not limited to birth defects, anxiety, weakness, chest pain, dizziness, cough and severe allergy.

## 2021-05-14 ENCOUNTER — APPOINTMENT (RX ONLY)
Dept: URBAN - METROPOLITAN AREA CLINIC 20 | Facility: CLINIC | Age: 81
Setting detail: DERMATOLOGY
End: 2021-05-14

## 2021-05-14 DIAGNOSIS — Z41.9 ENCOUNTER FOR PROCEDURE FOR PURPOSES OTHER THAN REMEDYING HEALTH STATE, UNSPECIFIED: ICD-10-CM

## 2021-05-14 PROCEDURE — ? SCITON BBL

## 2021-05-14 ASSESSMENT — LOCATION DETAILED DESCRIPTION DERM
LOCATION DETAILED: SUPERIOR MID FOREHEAD
LOCATION DETAILED: RIGHT INFERIOR CENTRAL MALAR CHEEK

## 2021-05-14 ASSESSMENT — LOCATION SIMPLE DESCRIPTION DERM
LOCATION SIMPLE: SUPERIOR FOREHEAD
LOCATION SIMPLE: RIGHT CHEEK

## 2021-05-14 ASSESSMENT — LOCATION ZONE DERM: LOCATION ZONE: FACE

## 2021-05-14 NOTE — PROCEDURE: SCITON BBL
Price (Use Numbers Only, No Special Characters Or $): 450
Repetition Rate (Hz): 10
Pulse Duration Units: milliseconds
Spot Size: Finesse Adapter Size: 15 x 45 mm (No Finesse Adapter)
Cooling (In C): 20
Passes: 1
Fluence (J/Cm2): 14
Cooling (In C): 15
Fluence (J/Cm2): 25
Cooling ?: Yes
Indication Override (Will Not Show Above If Text Entered): Vascular
Treatment Number: 2
Consent: Written consent obtained, risks reviewed including but not limited to crusting, scabbing, blistering, scarring, darker or lighter pigmentary change, bruising, and/or incomplete response.
Anesthesia Volume In Cc: 0
Fluence (J/Cm2): 12
Post-Care Instructions: I reviewed with the patient in detail post-care instructions. Patient should stay away from the sun and wear sun protection until treated areas are fully healed.
Preprocedure Text: The treatment areas were thoroughly cleaned. Any exposed at risk hair that was not to be treated was covered in white paper tape. Clear ultrasound gel was applied to the treatment area. The area was treated with no immediate stacking of pulses.
Hide Repetition Rate?: No
Post Procedure Text: The patient tolerated the procedure well. Ice-chilled washclothes were applied to the treatment area for comfort. Post care was reviewed with the patient.
Fluence (J/Cm2): 8
Spot Size: Finesse Adapter Size: 15 x 15 mm square
Detail Level: Zone

## 2021-06-02 ENCOUNTER — OFFICE VISIT (OUTPATIENT)
Dept: CARDIOLOGY | Facility: MEDICAL CENTER | Age: 81
End: 2021-06-02
Payer: MEDICARE

## 2021-06-02 VITALS
HEART RATE: 78 BPM | WEIGHT: 151 LBS | OXYGEN SATURATION: 96 % | HEIGHT: 62 IN | SYSTOLIC BLOOD PRESSURE: 120 MMHG | DIASTOLIC BLOOD PRESSURE: 58 MMHG | BODY MASS INDEX: 27.79 KG/M2

## 2021-06-02 DIAGNOSIS — I10 ESSENTIAL HYPERTENSION, BENIGN: ICD-10-CM

## 2021-06-02 PROCEDURE — 99213 OFFICE O/P EST LOW 20 MIN: CPT | Performed by: INTERNAL MEDICINE

## 2021-06-02 RX ORDER — AMLODIPINE BESYLATE 2.5 MG/1
2.5 TABLET ORAL DAILY
Qty: 30 TABLET | Refills: 9 | Status: SHIPPED | OUTPATIENT
Start: 2021-06-02 | End: 2022-02-11 | Stop reason: SDUPTHER

## 2021-06-02 ASSESSMENT — ENCOUNTER SYMPTOMS
CONSTITUTIONAL NEGATIVE: 1
CARDIOVASCULAR NEGATIVE: 1
MUSCULOSKELETAL NEGATIVE: 1
DEPRESSION: 1
GASTROINTESTINAL NEGATIVE: 1
RESPIRATORY NEGATIVE: 1
DIZZINESS: 0

## 2021-06-02 ASSESSMENT — FIBROSIS 4 INDEX: FIB4 SCORE: 0.86

## 2021-06-02 NOTE — PROGRESS NOTES
Chief Complaint   Patient presents with   • HTN (Controlled)       Subjective:   Danica Rapp is a 80 y.o. adult who presents today returns primarily for follow-up of blood pressure.  Her pressure was not under optimal control and her primary care provider increased her amlodipine from 2.5 mg a day to 5 mg a day.  Her blood pressure is under good control at this point, but she is having problems with afternoon edema.  The patient's depression is much better.  Her   several years ago, and she has had a very difficult time since being .  It turns out she has met a former high school classmate and they have been visiting each other.  She is exercising more and has managed to lose a few pounds.  Past Medical History:   Diagnosis Date   • Chickenpox    • Essential hypertension, benign 2012   • GERD (gastroesophageal reflux disease)    • [place] measles    • Mumps    • Nasal drainage    • Nonspecific abnormal electrocardiogram (ECG) (EKG) 2012   • Other chest pain 2012     Past Surgical History:   Procedure Laterality Date   • CHOLECYSTECTOMY     • HYSTERECTOMY LAPAROSCOPY       Family History   Problem Relation Age of Onset   • Lung Disease Mother    • Lung Disease Father    • Cancer Father      Social History     Socioeconomic History   • Marital status:      Spouse name: Not on file   • Number of children: Not on file   • Years of education: Not on file   • Highest education level: Not on file   Occupational History   • Not on file   Tobacco Use   • Smoking status: Never Smoker   • Smokeless tobacco: Never Used   Vaping Use   • Vaping Use: Never used   Substance and Sexual Activity   • Alcohol use: Yes     Alcohol/week: 1.2 - 1.8 oz     Types: 2 Glasses of wine per week     Comment: occ   • Drug use: No   • Sexual activity: Not on file   Other Topics Concern   • Not on file   Social History Narrative   • Not on file     Social Determinants of Health     Financial Resource Strain:     • Difficulty of Paying Living Expenses:    Food Insecurity:    • Worried About Running Out of Food in the Last Year:    • Ran Out of Food in the Last Year:    Transportation Needs:    • Lack of Transportation (Medical):    • Lack of Transportation (Non-Medical):    Physical Activity:    • Days of Exercise per Week:    • Minutes of Exercise per Session:    Stress:    • Feeling of Stress :    Social Connections:    • Frequency of Communication with Friends and Family:    • Frequency of Social Gatherings with Friends and Family:    • Attends [employer] Services:    • Active Member of Clubs or Organizations:    • Attends Club or Organization Meetings:    • Marital Status:    Intimate Partner Violence:    • Fear of Current or Ex-Partner:    • Emotionally Abused:    • Physically Abused:    • Sexually Abused:      Allergies   Allergen Reactions   • Macrodantin [Nitrofurantoin Macrocrystal] Itching and Swelling   • Amlodipine Swelling   • Augmentin Itching     Headaches   • Z-Pako [Azithromycin] Itching and Nausea     Outpatient Encounter Medications as of 6/2/2021   Medication Sig Dispense Refill   • amLODIPine (NORVASC) 2.5 MG Tab Take 1 tablet by mouth every day. 30 tablet 9   • amLODIPine (NORVASC) 5 MG Tab Take 1 tablet by mouth every day. 90 tablet 3   • ciprofloxacin (CIPRO) 500 MG Tab Take  by mouth. Take 1 tablet by mouth two times a day     • estradiol (CLIMARA) 0.1 MG/24HR PATCH WEEKLY      • phenazopyridine (PYRIDIUM) 100 MG Tab TK 1 TO 2 TS PO TID PRN     • sulfamethoxazole-trimethoprim (BACTRIM DS) 800-160 MG tablet TK 1 T PO  BID X 7 DAYS PER MD     • carvedilol (COREG) 25 MG Tab Take 1 Tab by mouth 2 times a day, with meals. 180 Tab 3   • valsartan-hydrochlorothiazide (DIOVAN-HCT) 320-12.5 MG per tablet TAKE 1 TABLET BY MOUTH EVERY DAY 90 Tab 3   • potassium chloride (KLOR-CON) 8 MEQ tablet TAKE 1 TABLET BY MOUTH EVERY DAY (Patient taking differently: Take 24 mEq by mouth every day.) 90 Tab 0   • Cranberry  "(THERACRAN PO) Take  by mouth.     • vitamin D (CHOLECALCIFEROL) 1000 UNIT Tab Take 1,000 Units by mouth every day. 2000mg     • Probiotic Product (PROBIOTIC DAILY PO) Take 1 Each by mouth every day.     • Calcium Carbonate-Vitamin D (CALCIUM + D PO) Take 500 mg by mouth every day.     • Multiple Vitamin (MULTI-VITAMIN DAILY PO) Take  by mouth every day.     • omeprazole (PRILOSEC) 20 MG CPDR Take 20 mg by mouth every day.       • estradiol (ESTRACE) 0.5 MG tablet 0.25mg Takes 3 days a week (Patient not taking: Reported on 2021)     • estradiol (VIVELLE DOT) 0.1 MG/24HR PTTW Apply 1 Patch to skin as directed every 7 days. (Patient not taking: Reported on 2021)     • estradiol (ESTRACE) 1 MG TABS Take 1 mg by mouth every day. Indications: taking half a tab (Patient not taking: Reported on 2021)       No facility-administered encounter medications on file as of 2021.     Review of Systems   Constitutional: Negative.    HENT: Negative.    Respiratory: Negative.    Cardiovascular: Negative.    Gastrointestinal: Negative.    Musculoskeletal: Negative.    Skin: Negative.    Neurological: Negative for dizziness.   Endo/Heme/Allergies: Negative.    Psychiatric/Behavioral: Positive for depression.        Objective:   /58 (BP Location: Left arm, Patient Position: Sitting, BP Cuff Size: Adult)   Pulse 78   Ht 1.575 m (5' 2\")   Wt 68.5 kg (151 lb)   SpO2 96%   BMI 27.62 kg/m²     Physical Exam   Constitutional: Danica Rapp is oriented to person, place, and time. No distress.   HENT:   Head: Normocephalic and atraumatic.   Eyes: Pupils are equal, round, and reactive to light. No scleral icterus.   Neck: No JVD present.   Cardiovascular: Normal rate and regular rhythm.   No murmur heard.  Pulmonary/Chest: No respiratory distress. Danica Rapp has no wheezes.   Abdominal: Soft. Danica Rapp exhibits no distension. There is no abdominal tenderness.   Musculoskeletal:      Right lower le+ " Pitting Edema present.      Left lower le+ Pitting Edema present.   Neurological: Danica Rapp is alert and oriented to person, place, and time.   Skin: Skin is warm.       Assessment:     1. Essential hypertension, benign         Medical Decision Making:  Today's Assessment / Status / Plan:   Hypertension: Under excellent control, however she is having difficulties with edema.  She has lost some weight and then has been walking more.  We will try to reduce her amlodipine back to 2 and half milligrams daily and this should help her swelling.  Her blood pressures been well managed by her primary care provider but she would like to continue following up here so we will make arrangements for her to be seen in 1 year.

## 2021-06-03 ENCOUNTER — APPOINTMENT (RX ONLY)
Dept: URBAN - METROPOLITAN AREA CLINIC 36 | Facility: CLINIC | Age: 81
Setting detail: DERMATOLOGY
End: 2021-06-03

## 2021-06-03 PROBLEM — C44.219 BASAL CELL CARCINOMA OF SKIN OF LEFT EAR AND EXTERNAL AURICULAR CANAL: Status: ACTIVE | Noted: 2021-06-03

## 2021-06-03 PROCEDURE — ? MOHS SURGERY

## 2021-06-03 PROCEDURE — 17311 MOHS 1 STAGE H/N/HF/G: CPT

## 2021-06-03 PROCEDURE — 13152 CMPLX RPR E/N/E/L 2.6-7.5 CM: CPT

## 2021-06-03 PROCEDURE — 17312 MOHS ADDL STAGE: CPT

## 2021-06-03 NOTE — PROCEDURE: MOHS SURGERY
Show Special Stain Variables In The Stage Tabs: Yes
Split-Thickness Skin Graft Text: The defect edges were debeveled with a #15 scalpel blade.  Given the location of the defect, shape of the defect and the proximity to free margins a split thickness skin graft was deemed most appropriate.  Using a sterile surgical marker, the primary defect shape was transferred to the donor site. The split thickness graft was then harvested.  The skin graft was then placed in the primary defect and oriented appropriately.
Asc Procedure Text (D): After obtaining clear surgical margins the patient was sent to an ASC for surgical repair.  The patient understands they will receive post-surgical care and follow-up from the ASC physician.
Suturegard Retention Suture: 0-0 Nylon
Did The Patient Refuse Pain Medications?: No
Consent (Nose)/Introductory Paragraph: The rationale for Mohs was explained to the patient and consent was obtained. The risks, benefits and alternatives to therapy were discussed in detail. Specifically, the risks of nasal deformity, changes in the flow of air through the nose, infection, scarring, bleeding, prolonged wound healing, incomplete removal, allergy to anesthesia, nerve injury and recurrence were addressed. Prior to the procedure, the treatment site was clearly identified and confirmed by the patient. All components of Universal Protocol/PAUSE Rule completed.
Cheek Interpolation Flap Text: A decision was made to reconstruct the defect utilizing an interpolation axial flap and a staged reconstruction.  A telfa template was made of the defect.  This telfa template was then used to outline the Cheek Interpolation flap.  The donor area for the pedicle flap was then injected with anesthesia.  The flap was excised through the skin and subcutaneous tissue down to the layer of the underlying musculature.  The interpolation flap was carefully excised within this deep plane to maintain its blood supply.  The edges of the donor site were undermined.   The donor site was closed in a primary fashion.  The pedicle was then rotated into position and sutured.  Once the tube was sutured into place, adequate blood supply was confirmed with blanching and refill.  The pedicle was then wrapped with xeroform gauze and dressed appropriately with a telfa and gauze bandage to ensure continued blood supply and protect the attached pedicle.
Medical Necessity Statement: Based on my medical judgement, Mohs surgery is the most appropriate treatment for this cancer compared to other treatments.
Cheiloplasty (Less Than 50%) Text: A decision was made to reconstruct the defect with a  cheiloplasty.  The defect was undermined extensively.  Additional obicularis oris muscle was excised with a 15 blade scalpel.  The defect was converted into a full thickness wedge, of less than 50% of the vertical height of the lip, to facilite a better cosmetic result.  Small vessels were then tied off with 5-0 monocyrl. The obicularis oris, superficial fascia, adipose and dermis were then reapproximated.  After the deeper layers were approximated the epidermis was reapproximated with particular care given to realign the vermilion border.
Mid-Level Procedure Text (C): After obtaining clear surgical margins the patient was sent to a mid-level provider for surgical repair.  The patient understands they will receive post-surgical care and follow-up from the mid-level provider.
Manual Repair Warning Statement: We plan on removing the manually selected variable below in favor of our much easier automatic structured text blocks found in the previous tab. We decided to do this to help make the flow better and give you the full power of structured data. Manual selection is never going to be ideal in our platform and I would encourage you to avoid using manual selection from this point on, especially since I will be sunsetting this feature. It is important that you do one of two things with the customized text below. First, you can save all of the text in a word file so you can have it for future reference. Second, transfer the text to the appropriate area in the Library tab. Lastly, if there is a flap or graft type which we do not have you need to let us know right away so I can add it in before the variable is hidden. No need to panic, we plan to give you roughly 6 months to make the change.
Stage 4: Additional Anesthesia Type: 1% lidocaine with epinephrine
Plastic Surgeon Procedure Text (A): After obtaining clear surgical margins the patient was sent to plastics for surgical repair.  The patient understands they will receive post-surgical care and follow-up from the referring physician's office.
Surgeon/Pathologist Verbiage (Will Incorporate Name Of Surgeon From Intro If Not Blank): operated in two distinct and integrated capacities as the surgeon and pathologist.
Estimated Blood Loss (Cc): minimal
Special Stains Stage 9 - Results: Base On Clearance Noted Above
Subsequent Stages Histo Method Verbiage: Using a similar technique to that described above, a thin layer of tissue was removed from all areas where tumor was visible on the previous stage.  The tissue was again oriented, mapped, dyed, and processed as above.
Consent (Near Eyelid Margin)/Introductory Paragraph: The rationale for Mohs was explained to the patient and consent was obtained. The risks, benefits and alternatives to therapy were discussed in detail. Specifically, the risks of ectropion or eyelid deformity, infection, scarring, bleeding, prolonged wound healing, incomplete removal, allergy to anesthesia, nerve injury and recurrence were addressed. Prior to the procedure, the treatment site was clearly identified and confirmed by the patient. All components of Universal Protocol/PAUSE Rule completed.
Oculoplastic Surgeon Procedure Text (E): After obtaining clear surgical margins the patient was sent to oculoplastics for surgical repair.  The patient understands they will receive post-surgical care and follow-up from the referring physician's office.
Mart-1 - Positive Histology Text: MART-1 staining demonstrates areas of higher density and clustering of melanocytes with Pagetoid spread upwards within the epidermis. The surgical margins are positive for tumor cells.
Quadrants Reporting?: 0
Purse String (Simple) Text: Given the location of the defect and the characteristics of the surrounding skin a purse string closure was deemed most appropriate.  Undermining was performed circumfirentially around the surgical defect.  A purse string suture was then placed and tightened.
Crescentic Advancement Flap Text: The defect edges were debeveled with a #15 scalpel blade.  Given the location of the defect and the proximity to free margins a crescentic advancement flap was deemed most appropriate.  Using a sterile surgical marker, the appropriate advancement flap was drawn incorporating the defect and placing the expected incisions within the relaxed skin tension lines where possible.    The area thus outlined was incised deep to adipose tissue with a #15 scalpel blade.  The skin margins were undermined to an appropriate distance in all directions utilizing iris scissors.
Consent (Marginal Mandibular)/Introductory Paragraph: The rationale for Mohs was explained to the patient and consent was obtained. The risks, benefits and alternatives to therapy were discussed in detail. Specifically, the risks of damage to the marginal mandibular branch of the facial nerve, infection, scarring, bleeding, prolonged wound healing, incomplete removal, allergy to anesthesia, and recurrence were addressed. Prior to the procedure, the treatment site was clearly identified and confirmed by the patient. All components of Universal Protocol/PAUSE Rule completed.
V-Y Flap Text: The defect edges were debeveled with a #15 scalpel blade.  Given the location of the defect, shape of the defect and the proximity to free margins a V-Y flap was deemed most appropriate.  Using a sterile surgical marker, an appropriate advancement flap was drawn incorporating the defect and placing the expected incisions within the relaxed skin tension lines where possible.    The area thus outlined was incised deep to adipose tissue with a #15 scalpel blade.  The skin margins were undermined to an appropriate distance in all directions utilizing iris scissors.
Trilobed Flap Text: The defect edges were debeveled with a #15 scalpel blade.  Given the location of the defect and the proximity to free margins a trilobed flap was deemed most appropriate.  Using a sterile surgical marker, an appropriate trilobed flap drawn around the defect.    The area thus outlined was incised deep to adipose tissue with a #15 scalpel blade.  The skin margins were undermined to an appropriate distance in all directions utilizing iris scissors.
Complex Repair Preamble Text (Leave Blank If You Do Not Want): Extensive wide undermining was performed.
Referring Physician (Optional): Dr. Duran
Postop Diagnosis: Basal Cell Carcinoma (infiltrative)
Unna Boot Text: An Unna boot was placed to help immobilize the limb and facilitate more rapid healing.
Alar Island Pedicle Flap Text: The defect edges were debeveled with a #15 scalpel blade.  Given the location of the defect, shape of the defect and the proximity to the alar rim an island pedicle advancement flap was deemed most appropriate.  Using a sterile surgical marker, an appropriate advancement flap was drawn incorporating the defect, outlining the appropriate donor tissue and placing the expected incisions within the nasal ala running parallel to the alar rim. The area thus outlined was incised with a #15 scalpel blade.  The skin margins were undermined minimally to an appropriate distance in all directions around the primary defect and laterally outward around the island pedicle utilizing iris scissors.  There was minimal undermining beneath the pedicle flap.
Wound Care: Vaseline
Consent Type: Consent 1 (Standard)
Suturegard Body: The suture ends were repeatedly re-tightened and re-clamped to achieve the desired tissue expansion.
Consent 2/Introductory Paragraph: Mohs surgery was explained to the patient and consent was obtained. The risks, benefits and alternatives to therapy were discussed in detail. Specifically, the risks of infection, scarring, bleeding, prolonged wound healing, incomplete removal, allergy to anesthesia, nerve injury and recurrence were addressed. Prior to the procedure, the treatment site was clearly identified and confirmed by the patient. All components of Universal Protocol/PAUSE Rule completed.
Repair Performed By Another Provider Text (Leave Blank If You Do Not Want): After obtaining clear surgical margins the defect was repaired by another provider.
Otolaryngologist Procedure Text (B): After obtaining clear surgical margins the patient was sent to otolaryngology for surgical repair.  The patient understands they will receive post-surgical care and follow-up from the referring physician's office.
Double Island Pedicle Flap Text: The defect edges were debeveled with a #15 scalpel blade.  Given the location of the defect, shape of the defect and the proximity to free margins a double island pedicle advancement flap was deemed most appropriate.  Using a sterile surgical marker, an appropriate advancement flap was drawn incorporating the defect, outlining the appropriate donor tissue and placing the expected incisions within the relaxed skin tension lines where possible.    The area thus outlined was incised deep to adipose tissue with a #15 scalpel blade.  The skin margins were undermined to an appropriate distance in all directions around the primary defect and laterally outward around the island pedicle utilizing iris scissors.  There was minimal undermining beneath the pedicle flap.
Pain Refusal Text: I offered to prescribe pain medication but the patient refused to take this medication.
Mohs Rapid Report Verbiage: The area of clinically evident tumor was marked with skin marking ink and appropriately hatched.  The initial incision was made following the Mohs approach through the skin.  The specimen was taken to the lab, divided into the necessary number of pieces, chromacoded and processed according to the Mohs protocol.  This was repeated in successive stages until a tumor free defect was achieved.
W Plasty Text: The lesion was extirpated to the level of the fat with a #15 scalpel blade.  Given the location of the defect, shape of the defect and the proximity to free margins a W-plasty was deemed most appropriate for repair.  Using a sterile surgical marker, the appropriate transposition arms of the W-plasty were drawn incorporating the defect and placing the expected incisions within the relaxed skin tension lines where possible.    The area thus outlined was incised deep to adipose tissue with a #15 scalpel blade.  The skin margins were undermined to an appropriate distance in all directions utilizing iris scissors.  The opposing transposition arms were then transposed into place in opposite direction and anchored with interrupted buried subcutaneous sutures.
Mauc Instructions: By selecting yes to the question below the MAUC number will be added into the note.  This will be calculated automatically based on the diagnosis chosen, the size entered, the body zone selected (H,M,L) and the specific indications you chose. You will also have the option to override the Mohs AUC if you disagree with the automatically calculated number and this option is found in the Case Summary tab.
O-Z Flap Text: The defect edges were debeveled with a #15 scalpel blade.  Given the location of the defect, shape of the defect and the proximity to free margins an O-Z flap was deemed most appropriate.  Using a sterile surgical marker, an appropriate transposition flap was drawn incorporating the defect and placing the expected incisions within the relaxed skin tension lines where possible. The area thus outlined was incised deep to adipose tissue with a #15 scalpel blade.  The skin margins were undermined to an appropriate distance in all directions utilizing iris scissors.
Mart-1 - Negative Histology Text: MART-1 staining demonstrates a normal density and pattern of melanocytes along the dermal-epidermal junction. The surgical margins are negative for tumor cells.
Closure 3 Information: This tab is for additional flaps and grafts above and beyond our usual structured repairs.  Please note if you enter information here it will not currently bill and you will need to add the billing information manually.
Retention Suture Bite Size: 1 mm
Initial Size Of Lesion: 0.9
Interpolation Flap Text: A decision was made to reconstruct the defect utilizing an interpolation axial flap and a staged reconstruction.  A telfa template was made of the defect.  This telfa template was then used to outline the interpolation flap.  The donor area for the pedicle flap was then injected with anesthesia.  The flap was excised through the skin and subcutaneous tissue down to the layer of the underlying musculature.  The interpolation flap was carefully excised within this deep plane to maintain its blood supply.  The edges of the donor site were undermined.   The donor site was closed in a primary fashion.  The pedicle was then rotated into position and sutured.  Once the tube was sutured into place, adequate blood supply was confirmed with blanching and refill.  The pedicle was then wrapped with xeroform gauze and dressed appropriately with a telfa and gauze bandage to ensure continued blood supply and protect the attached pedicle.
No Residual Tumor Seen Histology Text: There were no malignant cells seen in the sections examined.
Stage 1: Number Of Blocks?: 1
Tumor Debulked?: curette
Mohs Method Verbiage: An incision at a 45 degree angle following the standard Mohs approach was done and the specimen was harvested as a microscopic controlled layer.
Bcc Histology Text: There were numerous aggregates of basaloid cells.
Repair Anesthesia Method: local infiltration
Secondary Intention Text (Leave Blank If You Do Not Want): The defect will heal with secondary intention.
Skin Substitute Text: The defect edges were debeveled with a #15 scalpel blade.  Given the location of the defect, shape of the defect and the proximity to free margins a skin substitute graft was deemed most appropriate.  The graft material was trimmed to fit the size of the defect. The graft was then placed in the primary defect and oriented appropriately.
Consent (Temporal Branch)/Introductory Paragraph: The rationale for Mohs was explained to the patient and consent was obtained. The risks, benefits and alternatives to therapy were discussed in detail. Specifically, the risks of damage to the temporal branch of the facial nerve, infection, scarring, bleeding, prolonged wound healing, incomplete removal, allergy to anesthesia, and recurrence were addressed. Prior to the procedure, the treatment site was clearly identified and confirmed by the patient. All components of Universal Protocol/PAUSE Rule completed.
Rotation Flap Text: The defect edges were debeveled with a #15 scalpel blade.  Given the location of the defect, shape of the defect and the proximity to free margins a rotation flap was deemed most appropriate.  Using a sterile surgical marker, an appropriate rotation flap was drawn incorporating the defect and placing the expected incisions within the relaxed skin tension lines where possible.    The area thus outlined was incised deep to adipose tissue with a #15 scalpel blade.  The skin margins were undermined to an appropriate distance in all directions utilizing iris scissors.
Closure 2 Information: This tab is for additional flaps and grafts, including complex repair and grafts and complex repair and flaps. You can also specify a different location for the additional defect, if the location is the same you do not need to select a new one. We will insert the automated text for the repair you select below just as we do for solitary flaps and grafts. Please note that at this time if you select a location with a different insurance zone you will need to override the ICD10 and CPT if appropriate.
Same Histology In Subsequent Stages Text: The pattern and morphology of the tumor is as described in the first stage.
Helical Rim Text: The closure involved the helical rim.
Graft Cartilage Fenestration Text: The cartilage was fenestrated with a 2mm punch biopsy to help facilitate graft survival and healing.
Simple / Intermediate / Complex Repair - Final Wound Length In Cm: 2.8
Area M Indication Text: Tumors in this location are included in Area M (cheek, forehead, scalp, neck, jawline and pretibial skin).  Mohs surgery is indicated for tumors in these anatomic locations.
O-L Flap Text: The defect edges were debeveled with a #15 scalpel blade.  Given the location of the defect, shape of the defect and the proximity to free margins an O-L flap was deemed most appropriate.  Using a sterile surgical marker, an appropriate advancement flap was drawn incorporating the defect and placing the expected incisions within the relaxed skin tension lines where possible.    The area thus outlined was incised deep to adipose tissue with a #15 scalpel blade.  The skin margins were undermined to an appropriate distance in all directions utilizing iris scissors.
Anesthesia Volume In Cc: 6
S Plasty Text: Given the location and shape of the defect, and the orientation of relaxed skin tension lines, an S-plasty was deemed most appropriate for repair.  Using a sterile surgical marker, the appropriate outline of the S-plasty was drawn, incorporating the defect and placing the expected incisions within the relaxed skin tension lines where possible.  The area thus outlined was incised deep to adipose tissue with a #15 scalpel blade.  The skin margins were undermined to an appropriate distance in all directions utilizing iris scissors. The skin flaps were advanced over the defect.  The opposing margins were then approximated with interrupted buried subcutaneous sutures.
Dressing: pressure dressing with telfa
Dorsal Nasal Flap Text: The defect edges were debeveled with a #15 scalpel blade.  Given the location of the defect and the proximity to free margins a dorsal nasal flap was deemed most appropriate.  Using a sterile surgical marker, an appropriate dorsal nasal flap was drawn around the defect.    The area thus outlined was incised deep to adipose tissue with a #15 scalpel blade.  The skin margins were undermined to an appropriate distance in all directions utilizing iris scissors.
Date Of Previous Biopsy (Optional): 4/21/2021
Ear Wedge Repair Text: A wedge excision was completed by carrying down an excision through the full thickness of the ear and cartilage with an inward facing Burow's triangle. The wound was then closed in a layered fashion.
Mohs Histo Method Verbiage: Each section was then chromacoded and processed in the Mohs lab using the Mohs protocol and submitted for frozen section.
Consent 1/Introductory Paragraph: The rationale for Mohs was explained to the patient and consent was obtained. The risks, benefits and alternatives to therapy were discussed in detail. Specifically, the risks of infection, scarring, bleeding, prolonged wound healing, incomplete removal, allergy to anesthesia, nerve injury and recurrence were addressed. Prior to the procedure, the treatment site was clearly identified and confirmed by the patient. All components of Universal Protocol/PAUSE Rule completed.
Banner Transposition Flap Text: The defect edges were debeveled with a #15 scalpel blade.  Given the location of the defect and the proximity to free margins a Banner transposition flap was deemed most appropriate.  Using a sterile surgical marker, an appropriate flap drawn around the defect. The area thus outlined was incised deep to adipose tissue with a #15 scalpel blade.  The skin margins were undermined to an appropriate distance in all directions utilizing iris scissors.
O-T Advancement Flap Text: The defect edges were debeveled with a #15 scalpel blade.  Given the location of the defect, shape of the defect and the proximity to free margins an O-T advancement flap was deemed most appropriate.  Using a sterile surgical marker, an appropriate advancement flap was drawn incorporating the defect and placing the expected incisions within the relaxed skin tension lines where possible.    The area thus outlined was incised deep to adipose tissue with a #15 scalpel blade.  The skin margins were undermined to an appropriate distance in all directions utilizing iris scissors.
Repair Type: Complex Repair
Number Of Stages: 2
Complex Requirements: Involvement Of Free Margin?: Helical Rim
Where Do You Want The Question To Include Opioid Counseling Located?: Case Summary Tab
Spiral Flap Text: The defect edges were debeveled with a #15 scalpel blade.  Given the location of the defect, shape of the defect and the proximity to free margins a spiral flap was deemed most appropriate.  Using a sterile surgical marker, an appropriate rotation flap was drawn incorporating the defect and placing the expected incisions within the relaxed skin tension lines where possible. The area thus outlined was incised deep to adipose tissue with a #15 scalpel blade.  The skin margins were undermined to an appropriate distance in all directions utilizing iris scissors.
Full Thickness Lip Wedge Repair (Flap) Text: Given the location of the defect and the proximity to free margins a full thickness wedge repair was deemed most appropriate.  Using a sterile surgical marker, the appropriate repair was drawn incorporating the defect and placing the expected incisions perpendicular to the vermilion border.  The vermilion border was also meticulously outlined to ensure appropriate reapproximation during the repair.  The area thus outlined was incised through and through with a #15 scalpel blade.  The muscularis and dermis were reaproximated with deep sutures following hemostasis. Care was taken to realign the vermilion border before proceeding with the superficial closure.  Once the vermilion was realigned the superfical and mucosal closure was finished.
Eye Protection Verbiage: Before proceeding with the stage, a plastic scleral shield was inserted. The globe was anesthetized with a few drops of 1% lidocaine with 1:100,000 epinephrine. Then, an appropriate sized scleral shield was chosen and coated with lacrilube ointment. The shield was gently inserted and left in place for the duration of each stage. After the stage was completed, the shield was gently removed.
Area L Indication Text: Tumors in this location are included in Area L (trunk and extremities).  Mohs surgery is indicated for larger tumors, or tumors with aggressive histologic features, in these anatomic locations.
Primary Defect Length In Cm (Final Defect Size - Required For Flaps/Grafts): 1.3
Suturegard Intro: Intraoperative tissue expansion was performed, utilizing the SUTUREGARD device, in order to reduce wound tension.
Rhomboid Transposition Flap Text: The defect edges were debeveled with a #15 scalpel blade.  Given the location of the defect and the proximity to free margins a rhomboid transposition flap was deemed most appropriate.  Using a sterile surgical marker, an appropriate rhomboid flap was drawn incorporating the defect.    The area thus outlined was incised deep to adipose tissue with a #15 scalpel blade.  The skin margins were undermined to an appropriate distance in all directions utilizing iris scissors.
Location Indication Override (Is Already Calculated Based On Selected Body Location): Area H
Consent (Spinal Accessory)/Introductory Paragraph: The rationale for Mohs was explained to the patient and consent was obtained. The risks, benefits and alternatives to therapy were discussed in detail. Specifically, the risks of damage to the spinal accessory nerve, infection, scarring, bleeding, prolonged wound healing, incomplete removal, allergy to anesthesia, and recurrence were addressed. Prior to the procedure, the treatment site was clearly identified and confirmed by the patient. All components of Universal Protocol/PAUSE Rule completed.
Detail Level: Detailed
Advancement Flap (Double) Text: The defect edges were debeveled with a #15 scalpel blade.  Given the location of the defect and the proximity to free margins a double advancement flap was deemed most appropriate.  Using a sterile surgical marker, the appropriate advancement flaps were drawn incorporating the defect and placing the expected incisions within the relaxed skin tension lines where possible.    The area thus outlined was incised deep to adipose tissue with a #15 scalpel blade.  The skin margins were undermined to an appropriate distance in all directions utilizing iris scissors.
Star Wedge Flap Text: The defect edges were debeveled with a #15 scalpel blade.  Given the location of the defect, shape of the defect and the proximity to free margins a star wedge flap was deemed most appropriate.  Using a sterile surgical marker, an appropriate rotation flap was drawn incorporating the defect and placing the expected incisions within the relaxed skin tension lines where possible. The area thus outlined was incised deep to adipose tissue with a #15 scalpel blade.  The skin margins were undermined to an appropriate distance in all directions utilizing iris scissors.
Muscle Hinge Flap Text: The defect edges were debeveled with a #15 scalpel blade.  Given the size, depth and location of the defect and the proximity to free margins a muscle hinge flap was deemed most appropriate.  Using a sterile surgical marker, an appropriate hinge flap was drawn incorporating the defect. The area thus outlined was incised with a #15 scalpel blade.  The skin margins were undermined to an appropriate distance in all directions utilizing iris scissors.
Bilobed Transposition Flap Text: The defect edges were debeveled with a #15 scalpel blade.  Given the location of the defect and the proximity to free margins a bilobed transposition flap was deemed most appropriate.  Using a sterile surgical marker, an appropriate bilobe flap drawn around the defect.    The area thus outlined was incised deep to adipose tissue with a #15 scalpel blade.  The skin margins were undermined to an appropriate distance in all directions utilizing iris scissors.
Intermediate Repair Preamble Text (Leave Blank If You Do Not Want): Undermining was performed with blunt dissection.
Alternatives Discussed Intro (Do Not Add Period): I discussed alternative treatments to Mohs surgery and specifically discussed the risks and benefits of
Mercedes Flap Text: The defect edges were debeveled with a #15 scalpel blade.  Given the location of the defect, shape of the defect and the proximity to free margins a Mercedes flap was deemed most appropriate.  Using a sterile surgical marker, an appropriate advancement flap was drawn incorporating the defect and placing the expected incisions within the relaxed skin tension lines where possible. The area thus outlined was incised deep to adipose tissue with a #15 scalpel blade.  The skin margins were undermined to an appropriate distance in all directions utilizing iris scissors.
Repair Hemostasis (Optional): Pinpoint electrocautery
Ear Star Wedge Flap Text: The defect edges were debeveled with a #15 blade scalpel.  Given the location of the defect and the proximity to free margins (helical rim) an ear star wedge flap was deemed most appropriate.  Using a sterile surgical marker, the appropriate flap was drawn incorporating the defect and placing the expected incisions between the helical rim and antihelix where possible.  The area thus outlined was incised through and through with a #15 scalpel blade.
Surgeon: Denise Hampton MD
Undermining Type: Entire Wound
Double O-Z Flap Text: The defect edges were debeveled with a #15 scalpel blade.  Given the location of the defect, shape of the defect and the proximity to free margins a Double O-Z flap was deemed most appropriate.  Using a sterile surgical marker, an appropriate transposition flap was drawn incorporating the defect and placing the expected incisions within the relaxed skin tension lines where possible. The area thus outlined was incised deep to adipose tissue with a #15 scalpel blade.  The skin margins were undermined to an appropriate distance in all directions utilizing iris scissors.
Home Suture Removal Text: Patient was provided instructions on removing sutures and will remove their sutures at home.  If they have any questions or difficulties they will call the office.
Surgical Defect Width In Cm (Optional): 0.8
Posterior Auricular Interpolation Flap Text: A decision was made to reconstruct the defect utilizing an interpolation axial flap and a staged reconstruction.  A telfa template was made of the defect.  This telfa template was then used to outline the posterior auricular interpolation flap.  The donor area for the pedicle flap was then injected with anesthesia.  The flap was excised through the skin and subcutaneous tissue down to the layer of the underlying musculature.  The pedicle flap was carefully excised within this deep plane to maintain its blood supply.  The edges of the donor site were undermined.   The donor site was closed in a primary fashion.  The pedicle was then rotated into position and sutured.  Once the tube was sutured into place, adequate blood supply was confirmed with blanching and refill.  The pedicle was then wrapped with xeroform gauze and dressed appropriately with a telfa and gauze bandage to ensure continued blood supply and protect the attached pedicle.
Island Pedicle Flap-Requiring Vessel Identification Text: The defect edges were debeveled with a #15 scalpel blade.  Given the location of the defect, shape of the defect and the proximity to free margins an island pedicle advancement flap was deemed most appropriate.  Using a sterile surgical marker, an appropriate advancement flap was drawn, based on the axial vessel mentioned above, incorporating the defect, outlining the appropriate donor tissue and placing the expected incisions within the relaxed skin tension lines where possible.    The area thus outlined was incised deep to adipose tissue with a #15 scalpel blade.  The skin margins were undermined to an appropriate distance in all directions around the primary defect and laterally outward around the island pedicle utilizing iris scissors.  There was minimal undermining beneath the pedicle flap.
Bilobed Flap Text: The defect edges were debeveled with a #15 scalpel blade.  Given the location of the defect and the proximity to free margins a bilobe flap was deemed most appropriate.  Using a sterile surgical marker, an appropriate bilobe flap drawn around the defect.    The area thus outlined was incised deep to adipose tissue with a #15 scalpel blade.  The skin margins were undermined to an appropriate distance in all directions utilizing iris scissors.
Paramedian Forehead Flap Text: A decision was made to reconstruct the defect utilizing an interpolation axial flap and a staged reconstruction.  A telfa template was made of the defect.  This telfa template was then used to outline the paramedian forehead pedicle flap.  The donor area for the pedicle flap was then injected with anesthesia.  The flap was excised through the skin and subcutaneous tissue down to the layer of the underlying musculature.  The pedicle flap was carefully excised within this deep plane to maintain its blood supply.  The edges of the donor site were undermined.   The donor site was closed in a primary fashion.  The pedicle was then rotated into position and sutured.  Once the tube was sutured into place, adequate blood supply was confirmed with blanching and refill.  The pedicle was then wrapped with xeroform gauze and dressed appropriately with a telfa and gauze bandage to ensure continued blood supply and protect the attached pedicle.
Area H Indication Text: Tumors in this location are included in Area H (eyelids, eyebrows, nose, lips, chin, ear, pre-auricular, post-auricular, temple, genitalia, hands, feet, ankles and areola).  Tissue conservation is critical in these anatomic locations.
Island Pedicle Flap Text: The defect edges were debeveled with a #15 scalpel blade.  Given the location of the defect, shape of the defect and the proximity to free margins an island pedicle advancement flap was deemed most appropriate.  Using a sterile surgical marker, an appropriate advancement flap was drawn incorporating the defect, outlining the appropriate donor tissue and placing the expected incisions within the relaxed skin tension lines where possible.    The area thus outlined was incised deep to adipose tissue with a #15 scalpel blade.  The skin margins were undermined to an appropriate distance in all directions around the primary defect and laterally outward around the island pedicle utilizing iris scissors.  There was minimal undermining beneath the pedicle flap.
Mucosal Advancement Flap Text: Given the location of the defect, shape of the defect and the proximity to free margins a mucosal advancement flap was deemed most appropriate. Incisions were made with a 15 blade scalpel in the appropriate fashion along the cutaneous vermilion border and the mucosal lip. The remaining actinically damaged mucosal tissue was excised.  The mucosal advancement flap was then elevated to the gingival sulcus with care taken to preserve the neurovascular structures and advanced into the primary defect. Care was taken to ensure that precise realignment of the vermilion border was achieved.
Island Pedicle Flap With Canthal Suspension Text: The defect edges were debeveled with a #15 scalpel blade.  Given the location of the defect, shape of the defect and the proximity to free margins an island pedicle advancement flap was deemed most appropriate.  Using a sterile surgical marker, an appropriate advancement flap was drawn incorporating the defect, outlining the appropriate donor tissue and placing the expected incisions within the relaxed skin tension lines where possible. The area thus outlined was incised deep to adipose tissue with a #15 scalpel blade.  The skin margins were undermined to an appropriate distance in all directions around the primary defect and laterally outward around the island pedicle utilizing iris scissors.  There was minimal undermining beneath the pedicle flap. A suspension suture was placed in the canthal tendon to prevent tension and prevent ectropion.
Rhombic Flap Text: The defect edges were debeveled with a #15 scalpel blade.  Given the location of the defect and the proximity to free margins a rhombic flap was deemed most appropriate.  Using a sterile surgical marker, an appropriate rhombic flap was drawn incorporating the defect.    The area thus outlined was incised deep to adipose tissue with a #15 scalpel blade.  The skin margins were undermined to an appropriate distance in all directions utilizing iris scissors.
Composite Graft Text: The defect edges were debeveled with a #15 scalpel blade.  Given the location of the defect, shape of the defect, the proximity to free margins and the fact the defect was full thickness a composite graft was deemed most appropriate.  The defect was outline and then transferred to the donor site.  A full thickness graft was then excised from the donor site. The graft was then placed in the primary defect, oriented appropriately and then sutured into place.  The secondary defect was then repaired using a primary closure.
Anesthesia Volume In Cc: 4
Epidermal Sutures: 5-0 Surgipro
Advancement-Rotation Flap Text: The defect edges were debeveled with a #15 scalpel blade.  Given the location of the defect, shape of the defect and the proximity to free margins an advancement-rotation flap was deemed most appropriate.  Using a sterile surgical marker, an appropriate flap was drawn incorporating the defect and placing the expected incisions within the relaxed skin tension lines where possible. The area thus outlined was incised deep to adipose tissue with a #15 scalpel blade.  The skin margins were undermined to an appropriate distance in all directions utilizing iris scissors.
Retention Suture Text: Retention sutures were placed to support the closure and prevent dehiscence.
Complex Repair And Graft Additional Text (Will Appearing After The Standard Complex Repair Text): The complex repair was not sufficient to completely close the primary defect. The remaining additional defect was repaired with the graft mentioned below.
Cheiloplasty (Complex) Text: A decision was made to reconstruct the defect with a  cheiloplasty.  The defect was undermined extensively.  Additional obicularis oris muscle was excised with a 15 blade scalpel.  The defect was converted into a full thickness wedge to facilite a better cosmetic result.  Small vessels were then tied off with 5-0 monocyrl. The obicularis oris, superficial fascia, adipose and dermis were then reapproximated.  After the deeper layers were approximated the epidermis was reapproximated with particular care given to realign the vermilion border.
Dermal Autograft Text: The defect edges were debeveled with a #15 scalpel blade.  Given the location of the defect, shape of the defect and the proximity to free margins a dermal autograft was deemed most appropriate.  Using a sterile surgical marker, the primary defect shape was transferred to the donor site. The area thus outlined was incised deep to adipose tissue with a #15 scalpel blade.  The harvested graft was then trimmed of adipose and epidermal tissue until only dermis was left.  The skin graft was then placed in the primary defect and oriented appropriately.
Previous Accession (Optional): [id number].
Purse String (Intermediate) Text: Given the location of the defect and the characteristics of the surrounding skin a purse string intermediate closure was deemed most appropriate.  Undermining was performed circumfirentially around the surgical defect.  A purse string suture was then placed and tightened.
Z Plasty Text: The lesion was extirpated to the level of the fat with a #15 scalpel blade.  Given the location of the defect, shape of the defect and the proximity to free margins a Z-plasty was deemed most appropriate for repair.  Using a sterile surgical marker, the appropriate transposition arms of the Z-plasty were drawn incorporating the defect and placing the expected incisions within the relaxed skin tension lines where possible.    The area thus outlined was incised deep to adipose tissue with a #15 scalpel blade.  The skin margins were undermined to an appropriate distance in all directions utilizing iris scissors.  The opposing transposition arms were then transposed into place in opposite direction and anchored with interrupted buried subcutaneous sutures.
Complex Repair And Flap Additional Text (Will Appearing After The Standard Complex Repair Text): The complex repair was not sufficient to completely close the primary defect. The remaining additional defect was repaired with the flap mentioned below.
Surgical Defect Length In Cm (Optional): 1.1
V-Y Plasty Text: The defect edges were debeveled with a #15 scalpel blade.  Given the location of the defect, shape of the defect and the proximity to free margins an V-Y advancement flap was deemed most appropriate.  Using a sterile surgical marker, an appropriate advancement flap was drawn incorporating the defect and placing the expected incisions within the relaxed skin tension lines where possible.    The area thus outlined was incised deep to adipose tissue with a #15 scalpel blade.  The skin margins were undermined to an appropriate distance in all directions utilizing iris scissors.
Burow's Advancement Flap Text: The defect edges were debeveled with a #15 scalpel blade.  Given the location of the defect and the proximity to free margins a Burow's advancement flap was deemed most appropriate.  Using a sterile surgical marker, the appropriate advancement flap was drawn incorporating the defect and placing the expected incisions within the relaxed skin tension lines where possible.    The area thus outlined was incised deep to adipose tissue with a #15 scalpel blade.  The skin margins were undermined to an appropriate distance in all directions utilizing iris scissors.
Consent (Lip)/Introductory Paragraph: The rationale for Mohs was explained to the patient and consent was obtained. The risks, benefits and alternatives to therapy were discussed in detail. Specifically, the risks of lip deformity, changes in the oral aperture, infection, scarring, bleeding, prolonged wound healing, incomplete removal, allergy to anesthesia, nerve injury and recurrence were addressed. Prior to the procedure, the treatment site was clearly identified and confirmed by the patient. All components of Universal Protocol/PAUSE Rule completed.
Consent (Scalp)/Introductory Paragraph: The rationale for Mohs was explained to the patient and consent was obtained. The risks, benefits and alternatives to therapy were discussed in detail. Specifically, the risks of changes in hair growth pattern secondary to repair, infection, scarring, bleeding, prolonged wound healing, incomplete removal, allergy to anesthesia, nerve injury and recurrence were addressed. Prior to the procedure, the treatment site was clearly identified and confirmed by the patient. All components of Universal Protocol/PAUSE Rule completed.
Wound Check: 6 weeks
Nostril Rim Text: The closure involved the nostril rim.
Tarsorrhaphy Text: A tarsorrhaphy was performed using Frost sutures.
Wound Care (No Sutures): Petrolatum
Graft Donor Site Bandage (Optional-Leave Blank If You Don't Want In Note): Aquaplast was fitted to the graft site and sewn into place. A pressure bandage were applied to the donor site and over the aquaplast bolster.
A-T Advancement Flap Text: The defect edges were debeveled with a #15 scalpel blade.  Given the location of the defect, shape of the defect and the proximity to free margins an A-T advancement flap was deemed most appropriate.  Using a sterile surgical marker, an appropriate advancement flap was drawn incorporating the defect and placing the expected incisions within the relaxed skin tension lines where possible.    The area thus outlined was incised deep to adipose tissue with a #15 scalpel blade.  The skin margins were undermined to an appropriate distance in all directions utilizing iris scissors.
Tissue Cultured Epidermal Autograft Text: The defect edges were debeveled with a #15 scalpel blade.  Given the location of the defect, shape of the defect and the proximity to free margins a tissue cultured epidermal autograft was deemed most appropriate.  The graft was then trimmed to fit the size of the defect.  The graft was then placed in the primary defect and oriented appropriately.
Advancement Flap (Single) Text: The defect edges were debeveled with a #15 scalpel blade.  Given the location of the defect and the proximity to free margins a single advancement flap was deemed most appropriate.  Using a sterile surgical marker, an appropriate advancement flap was drawn incorporating the defect and placing the expected incisions within the relaxed skin tension lines where possible.    The area thus outlined was incised deep to adipose tissue with a #15 scalpel blade.  The skin margins were undermined to an appropriate distance in all directions utilizing iris scissors.
Melolabial Transposition Flap Text: The defect edges were debeveled with a #15 scalpel blade.  Given the location of the defect and the proximity to free margins a melolabial flap was deemed most appropriate.  Using a sterile surgical marker, an appropriate melolabial transposition flap was drawn incorporating the defect.    The area thus outlined was incised deep to adipose tissue with a #15 scalpel blade.  The skin margins were undermined to an appropriate distance in all directions utilizing iris scissors.
X Size Of Lesion In Cm (Optional): 0.6
Suture Removal: 11 days
O-Z Plasty Text: The defect edges were debeveled with a #15 scalpel blade.  Given the location of the defect, shape of the defect and the proximity to free margins an O-Z plasty (double transposition flap) was deemed most appropriate.  Using a sterile surgical marker, the appropriate transposition flaps were drawn incorporating the defect and placing the expected incisions within the relaxed skin tension lines where possible.    The area thus outlined was incised deep to adipose tissue with a #15 scalpel blade.  The skin margins were undermined to an appropriate distance in all directions utilizing iris scissors.  Hemostasis was achieved with electrocautery.  The flaps were then transposed into place, one clockwise and the other counterclockwise, and anchored with interrupted buried subcutaneous sutures.
Consent (Ear)/Introductory Paragraph: The rationale for Mohs was explained to the patient and consent was obtained. The risks, benefits and alternatives to therapy were discussed in detail. Specifically, the risks of ear deformity, infection, scarring, bleeding, prolonged wound healing, incomplete removal, allergy to anesthesia, nerve injury and recurrence were addressed. Prior to the procedure, the treatment site was clearly identified and confirmed by the patient. All components of Universal Protocol/PAUSE Rule completed.
Surgical Defect Width In Cm (Optional): 1.0
Xenograft Text: The defect edges were debeveled with a #15 scalpel blade.  Given the location of the defect, shape of the defect and the proximity to free margins a xenograft was deemed most appropriate.  The graft was then trimmed to fit the size of the defect.  The graft was then placed in the primary defect and oriented appropriately.
Ftsg Text: The defect edges were debeveled with a #15 scalpel blade.  Given the location of the defect, shape of the defect and the proximity to free margins a full thickness skin graft was deemed most appropriate.  Using a sterile surgical marker, the primary defect shape was transferred to the donor site. The area thus outlined was incised deep to adipose tissue with a #15 scalpel blade.  The harvested graft was then trimmed of adipose tissue until only dermis and epidermis was left.  The skin margins of the secondary defect were undermined to an appropriate distance in all directions utilizing iris scissors.  The secondary defect was closed with interrupted buried subcutaneous sutures.  The skin edges were then re-apposed with running  sutures.  The skin graft was then placed in the primary defect and oriented appropriately.
Cheek-To-Nose Interpolation Flap Text: A decision was made to reconstruct the defect utilizing an interpolation axial flap and a staged reconstruction.  A telfa template was made of the defect.  This telfa template was then used to outline the Cheek-To-Nose Interpolation flap.  The donor area for the pedicle flap was then injected with anesthesia.  The flap was excised through the skin and subcutaneous tissue down to the layer of the underlying musculature.  The interpolation flap was carefully excised within this deep plane to maintain its blood supply.  The edges of the donor site were undermined.   The donor site was closed in a primary fashion.  The pedicle was then rotated into position and sutured.  Once the tube was sutured into place, adequate blood supply was confirmed with blanching and refill.  The pedicle was then wrapped with xeroform gauze and dressed appropriately with a telfa and gauze bandage to ensure continued blood supply and protect the attached pedicle.
Bi-Rhombic Flap Text: The defect edges were debeveled with a #15 scalpel blade.  Given the location of the defect and the proximity to free margins a bi-rhombic flap was deemed most appropriate.  Using a sterile surgical marker, an appropriate rhombic flap was drawn incorporating the defect. The area thus outlined was incised deep to adipose tissue with a #15 scalpel blade.  The skin margins were undermined to an appropriate distance in all directions utilizing iris scissors.
Melolabial Interpolation Flap Text: A decision was made to reconstruct the defect utilizing an interpolation axial flap and a staged reconstruction.  A telfa template was made of the defect.  This telfa template was then used to outline the melolabial interpolation flap.  The donor area for the pedicle flap was then injected with anesthesia.  The flap was excised through the skin and subcutaneous tissue down to the layer of the underlying musculature.  The pedicle flap was carefully excised within this deep plane to maintain its blood supply.  The edges of the donor site were undermined.   The donor site was closed in a primary fashion.  The pedicle was then rotated into position and sutured.  Once the tube was sutured into place, adequate blood supply was confirmed with blanching and refill.  The pedicle was then wrapped with xeroform gauze and dressed appropriately with a telfa and gauze bandage to ensure continued blood supply and protect the attached pedicle.
Undermining Location (Optional): in the superficial subcutaneous fat
Partial Purse String (Simple) Text: Given the location of the defect and the characteristics of the surrounding skin a simple purse string closure was deemed most appropriate.  Undermining was performed circumfirentially around the surgical defect.  A purse string suture was then placed and tightened. Wound tension only allowed a partial closure of the circular defect.
Localized Dermabrasion With Wire Brush Text: The patient was draped in routine manner.  Localized dermabrasion using 3 x 17 mm wire brush was performed in routine manner to papillary dermis. This spot dermabrasion is being performed to complete skin cancer reconstruction. It also will eliminate the other sun damaged precancerous cells that are known to be part of the regional effect of a lifetime's worth of sun exposure. This localized dermabrasion is therapeutic and should not be considered cosmetic in any regard.
Double O-Z Plasty Text: The defect edges were debeveled with a #15 scalpel blade.  Given the location of the defect, shape of the defect and the proximity to free margins a Double O-Z plasty (double transposition flap) was deemed most appropriate.  Using a sterile surgical marker, the appropriate transposition flaps were drawn incorporating the defect and placing the expected incisions within the relaxed skin tension lines where possible. The area thus outlined was incised deep to adipose tissue with a #15 scalpel blade.  The skin margins were undermined to an appropriate distance in all directions utilizing iris scissors.  Hemostasis was achieved with electrocautery.  The flaps were then transposed into place, one clockwise and the other counterclockwise, and anchored with interrupted buried subcutaneous sutures.
Non-Graft Cartilage Fenestration Text: The cartilage was fenestrated with a 2mm punch biopsy to help facilitate healing.
Transposition Flap Text: The defect edges were debeveled with a #15 scalpel blade.  Given the location of the defect and the proximity to free margins a transposition flap was deemed most appropriate.  Using a sterile surgical marker, an appropriate transposition flap was drawn incorporating the defect.    The area thus outlined was incised deep to adipose tissue with a #15 scalpel blade.  The skin margins were undermined to an appropriate distance in all directions utilizing iris scissors.
Hemostasis: Electrocautery
O-T Plasty Text: The defect edges were debeveled with a #15 scalpel blade.  Given the location of the defect, shape of the defect and the proximity to free margins an O-T plasty was deemed most appropriate.  Using a sterile surgical marker, an appropriate O-T plasty was drawn incorporating the defect and placing the expected incisions within the relaxed skin tension lines where possible.    The area thus outlined was incised deep to adipose tissue with a #15 scalpel blade.  The skin margins were undermined to an appropriate distance in all directions utilizing iris scissors.
Modified Advancement Flap Text: The defect edges were debeveled with a #15 scalpel blade.  Given the location of the defect, shape of the defect and the proximity to free margins a modified advancement flap was deemed most appropriate.  Using a sterile surgical marker, an appropriate advancement flap was drawn incorporating the defect and placing the expected incisions within the relaxed skin tension lines where possible.    The area thus outlined was incised deep to adipose tissue with a #15 scalpel blade.  The skin margins were undermined to an appropriate distance in all directions utilizing iris scissors.
Donor Site Anesthesia Type: same as repair anesthesia
Consent 3/Introductory Paragraph: I gave the patient a chance to ask questions they had about the procedure.  Following this I explained the Mohs procedure and consent was obtained. The risks, benefits and alternatives to therapy were discussed in detail. Specifically, the risks of infection, scarring, bleeding, prolonged wound healing, incomplete removal, allergy to anesthesia, nerve injury and recurrence were addressed. Prior to the procedure, the treatment site was clearly identified and confirmed by the patient. All components of Universal Protocol/PAUSE Rule completed.
Mohs Case Number: [id number]
Hatchet Flap Text: The defect edges were debeveled with a #15 scalpel blade.  Given the location of the defect, shape of the defect and the proximity to free margins a hatchet flap was deemed most appropriate.  Using a sterile surgical marker, an appropriate hatchet flap was drawn incorporating the defect and placing the expected incisions within the relaxed skin tension lines where possible.    The area thus outlined was incised deep to adipose tissue with a #15 scalpel blade.  The skin margins were undermined to an appropriate distance in all directions utilizing iris scissors.
H Plasty Text: Given the location of the defect, shape of the defect and the proximity to free margins a H-plasty was deemed most appropriate for repair.  Using a sterile surgical marker, the appropriate advancement arms of the H-plasty were drawn incorporating the defect and placing the expected incisions within the relaxed skin tension lines where possible. The area thus outlined was incised deep to adipose tissue with a #15 scalpel blade. The skin margins were undermined to an appropriate distance in all directions utilizing iris scissors.  The opposing advancement arms were then advanced into place in opposite direction and anchored with interrupted buried subcutaneous sutures.
Cartilage Graft Text: The defect edges were debeveled with a #15 scalpel blade.  Given the location of the defect, shape of the defect, the fact the defect involved a full thickness cartilage defect a cartilage graft was deemed most appropriate.  An appropriate donor site was identified, cleansed, and anesthetized. The cartilage graft was then harvested and transferred to the recipient site, oriented appropriately and then sutured into place.  The secondary defect was then repaired using a primary closure.
Epidermal Closure: running cuticular
Debridement Text: The wound edges were debrided prior to proceeding with the closure to facilitate wound healing.
Partial Purse String (Intermediate) Text: Given the location of the defect and the characteristics of the surrounding skin an intermediate purse string closure was deemed most appropriate.  Undermining was performed circumfirentially around the surgical defect.  A purse string suture was then placed and tightened. Wound tension only allowed a partial closure of the circular defect.
Bilateral Helical Rim Advancement Flap Text: The defect edges were debeveled with a #15 blade scalpel.  Given the location of the defect and the proximity to free margins (helical rim) a bilateral helical rim advancement flap was deemed most appropriate.  Using a sterile surgical marker, the appropriate advancement flaps were drawn incorporating the defect and placing the expected incisions between the helical rim and antihelix where possible.  The area thus outlined was incised through and through with a #15 scalpel blade.  With a skin hook and iris scissors, the flaps were gently and sharply undermined and freed up.
Epidermal Autograft Text: The defect edges were debeveled with a #15 scalpel blade.  Given the location of the defect, shape of the defect and the proximity to free margins an epidermal autograft was deemed most appropriate.  Using a sterile surgical marker, the primary defect shape was transferred to the donor site. The epidermal graft was then harvested.  The skin graft was then placed in the primary defect and oriented appropriately.
Post-Care Instructions: I reviewed with the patient in detail post-care instructions. Patient is not to engage in any heavy lifting, exercise, or swimming for the next 14 days. Should the patient develop any fevers, chills, bleeding, severe pain patient will contact the office immediately.
Deep Sutures: 5-0 Maxon
Bcc Infiltrative Histology Text: There were numerous aggregates of basaloid cells demonstrating an infiltrative pattern.
Epidermal Closure Graft Donor Site (Optional): running
Keystone Flap Text: The defect edges were debeveled with a #15 scalpel blade.  Given the location of the defect, shape of the defect a keystone flap was deemed most appropriate.  Using a sterile surgical marker, an appropriate keystone flap was drawn incorporating the defect, outlining the appropriate donor tissue and placing the expected incisions within the relaxed skin tension lines where possible. The area thus outlined was incised deep to adipose tissue with a #15 scalpel blade.  The skin margins were undermined to an appropriate distance in all directions around the primary defect and laterally outward around the flap utilizing iris scissors.
Chonodrocutaneous Helical Advancement Flap Text: The defect edges were debeveled with a #15 scalpel blade.  Given the location of the defect and the proximity to free margins a chondrocutaneous helical advancement flap was deemed most appropriate.  Using a sterile surgical marker, the appropriate advancement flap was drawn incorporating the defect and placing the expected incisions within the relaxed skin tension lines where possible.    The area thus outlined was incised deep to adipose tissue with a #15 scalpel blade.  The skin margins were undermined to an appropriate distance in all directions utilizing iris scissors.
Vermilion Border Text: The closure involved the vermilion border.
Mastoid Interpolation Flap Text: A decision was made to reconstruct the defect utilizing an interpolation axial flap and a staged reconstruction.  A telfa template was made of the defect.  This telfa template was then used to outline the mastoid interpolation flap.  The donor area for the pedicle flap was then injected with anesthesia.  The flap was excised through the skin and subcutaneous tissue down to the layer of the underlying musculature.  The pedicle flap was carefully excised within this deep plane to maintain its blood supply.  The edges of the donor site were undermined.   The donor site was closed in a primary fashion.  The pedicle was then rotated into position and sutured.  Once the tube was sutured into place, adequate blood supply was confirmed with blanching and refill.  The pedicle was then wrapped with xeroform gauze and dressed appropriately with a telfa and gauze bandage to ensure continued blood supply and protect the attached pedicle.
Inflammation Suggestive Of Cancer Camouflage Histology Text: There was a dense lymphocytic infiltrate which prevented adequate histologic evaluation of adjacent structures.
Information: Selecting Yes will display possible errors in your note based on the variables you have selected. This validation is only offered as a suggestion for you. PLEASE NOTE THAT THE VALIDATION TEXT WILL BE REMOVED WHEN YOU FINALIZE YOUR NOTE. IF YOU WANT TO FAX A PRELIMINARY NOTE YOU WILL NEED TO TOGGLE THIS TO 'NO' IF YOU DO NOT WANT IT IN YOUR FAXED NOTE.
Helical Rim Advancement Flap Text: The defect edges were debeveled with a #15 blade scalpel.  Given the location of the defect and the proximity to free margins (helical rim) a double helical rim advancement flap was deemed most appropriate.  Using a sterile surgical marker, the appropriate advancement flaps were drawn incorporating the defect and placing the expected incisions between the helical rim and antihelix where possible.  The area thus outlined was incised through and through with a #15 scalpel blade.  With a skin hook and iris scissors, the flaps were gently and sharply undermined and freed up.
No Repair - Repaired With Adjacent Surgical Defect Text (Leave Blank If You Do Not Want): After obtaining clear surgical margins the defect was repaired concurrently with another surgical defect which was in close approximation.
Graft Donor Site Dermal Sutures (Optional): 5-0 Polysorb
Peng Advancement Flap Text: The defect edges were debeveled with a #15 scalpel blade.  Given the location of the defect, shape of the defect and the proximity to free margins a Peng advancement flap was deemed most appropriate.  Using a sterile surgical marker, an appropriate advancement flap was drawn incorporating the defect and placing the expected incisions within the relaxed skin tension lines where possible. The area thus outlined was incised deep to adipose tissue with a #15 scalpel blade.  The skin margins were undermined to an appropriate distance in all directions utilizing iris scissors.
Zygomaticofacial Flap Text: Given the location of the defect, shape of the defect and the proximity to free margins a zygomaticofacial flap was deemed most appropriate for repair.  Using a sterile surgical marker, the appropriate flap was drawn incorporating the defect and placing the expected incisions within the relaxed skin tension lines where possible. The area thus outlined was incised deep to adipose tissue with a #15 scalpel blade with preservation of a vascular pedicle.  The skin margins were undermined to an appropriate distance in all directions utilizing iris scissors.  The flap was then placed into the defect and anchored with interrupted buried subcutaneous sutures.
Tumor Depth: Less than 6mm from granular layer and no invasion beyond the subcutaneous fat
Staging Info: By selecting yes to the question above you will include information on AJCC 8 tumor staging in your Mohs note. Information on tumor staging will be automatically added for SCCs on the head and neck. AJCC 8 includes tumor size, tumor depth, perineural involvement and bone invasion.
Why Was The Change Made?: Please Select the Appropriate Response
Hemigard Postcare Instructions: The HEMIGARD strips are to remain completely dry for at least 5-7 days.
Hemigard Intro: Due to skin fragility and wound tension, it was decided to use HEMIGARD adhesive retention suture devices to permit a linear closure. The skin was cleaned and dried for a 6cm distance away from the wound. Excessive hair, if present, was removed to allow for adhesion.
Orbicularis Oris Muscle Flap Text: The defect edges were debeveled with a #15 scalpel blade.  Given that the defect affected the competency of the oral sphincter an orbicularis oris muscle flap was deemed most appropriate to restore this competency and normal muscle function.  Using a sterile surgical marker, an appropriate flap was drawn incorporating the defect. The area thus outlined was incised with a #15 scalpel blade.
Brow Lift Text: A midfrontal incision was made medially to the defect to allow access to the tissues just superior to the left eyebrow. Following careful dissection inferiorly in a supraperiosteal plane to the level of the left eyebrow, several 3-0 monocryl sutures were used to resuspend the eyebrow orbicularis oculi muscular unit to the superior frontal bone periosteum. This resulted in an appropriate reapproximation of static eyebrow symmetry and correction of the left brow ptosis.
Nasal Turnover Hinge Flap Text: The defect edges were debeveled with a #15 scalpel blade.  Given the size, depth, location of the defect and the defect being full thickness a nasal turnover hinge flap was deemed most appropriate.  Using a sterile surgical marker, an appropriate hinge flap was drawn incorporating the defect. The area thus outlined was incised with a #15 scalpel blade. The flap was designed to recreate the nasal mucosal lining and the alar rim. The skin margins were undermined to an appropriate distance in all directions utilizing iris scissors.
Nasalis-Muscle-Based Myocutaneous Island Pedicle Flap Text: Using a #15 blade, an incision was made around the donor flap to the level of the nasalis muscle. Wide lateral undermining was then performed in both the subcutaneous plane above the nasalis muscle, and in a submuscular plane just above periosteum. This allowed the formation of a free nasalis muscle axial pedicle (based on the angular artery) which was still attached to the actual cutaneous flap, increasing its mobility and vascular viability. Hemostasis was obtained with pinpoint electrocoagulation. The flap was mobilized into position and the pivotal anchor points positioned and stabilized with buried interrupted sutures. Subcutaneous and dermal tissues were closed in a multilayered fashion with sutures. Tissue redundancies were excised, and the epidermal edges were apposed without significant tension and sutured with sutures.
Burow's Graft Text: The defect edges were debeveled with a #15 scalpel blade.  Given the location of the defect, shape of the defect, the proximity to free margins and the presence of a standing cone deformity a Burow's skin graft was deemed most appropriate. The standing cone was removed and this tissue was then trimmed to the shape of the primary defect. The adipose tissue was also removed until only dermis and epidermis were left.  The skin margins of the secondary defect were undermined to an appropriate distance in all directions utilizing iris scissors.  The secondary defect was closed with interrupted buried subcutaneous sutures.  The skin edges were then re-apposed with running  sutures.  The skin graft was then placed in the primary defect and oriented appropriately.

## 2021-06-04 ENCOUNTER — RX ONLY (OUTPATIENT)
Age: 81
Setting detail: RX ONLY
End: 2021-06-04

## 2021-06-04 RX ORDER — HYDROCODONE BITARTRATE AND ACETAMINOPHEN 5; 325 MG/1; MG/1
TABLET ORAL
Qty: 20 | Refills: 0 | Status: ERX | COMMUNITY
Start: 2021-06-04

## 2021-06-08 ENCOUNTER — APPOINTMENT (RX ONLY)
Dept: URBAN - METROPOLITAN AREA CLINIC 36 | Facility: CLINIC | Age: 81
Setting detail: DERMATOLOGY
End: 2021-06-08

## 2021-06-08 DIAGNOSIS — Z48.817 ENCOUNTER FOR SURGICAL AFTERCARE FOLLOWING SURGERY ON THE SKIN AND SUBCUTANEOUS TISSUE: ICD-10-CM

## 2021-06-08 PROCEDURE — ? POST-OP WOUND CHECK

## 2021-06-08 ASSESSMENT — LOCATION SIMPLE DESCRIPTION DERM: LOCATION SIMPLE: LEFT EAR

## 2021-06-08 ASSESSMENT — LOCATION DETAILED DESCRIPTION DERM: LOCATION DETAILED: LEFT SUPERIOR HELIX

## 2021-06-08 ASSESSMENT — LOCATION ZONE DERM: LOCATION ZONE: EAR

## 2021-06-08 NOTE — PROCEDURE: POST-OP WOUND CHECK
Detail Level: Detailed
Add 04050 Cpt? (Important Note: In 2017 The Use Of 72399 Is Being Tracked By Cms To Determine Future Global Period Reimbursement For Global Periods): no
Wound Assessment Override (Optional): sutures intact, no signs of infection

## 2021-06-14 ENCOUNTER — APPOINTMENT (RX ONLY)
Dept: URBAN - METROPOLITAN AREA CLINIC 36 | Facility: CLINIC | Age: 81
Setting detail: DERMATOLOGY
End: 2021-06-14

## 2021-06-14 DIAGNOSIS — Z48.02 ENCOUNTER FOR REMOVAL OF SUTURES: ICD-10-CM

## 2021-06-14 PROCEDURE — 99024 POSTOP FOLLOW-UP VISIT: CPT

## 2021-06-14 PROCEDURE — ? SUTURE REMOVAL (GLOBAL PERIOD)

## 2021-06-14 ASSESSMENT — LOCATION ZONE DERM: LOCATION ZONE: EAR

## 2021-06-14 ASSESSMENT — LOCATION DETAILED DESCRIPTION DERM: LOCATION DETAILED: LEFT SUPERIOR HELIX

## 2021-06-14 ASSESSMENT — LOCATION SIMPLE DESCRIPTION DERM: LOCATION SIMPLE: LEFT EAR

## 2021-06-14 NOTE — PROCEDURE: SUTURE REMOVAL (GLOBAL PERIOD)
Detail Level: Detailed
Add 25244 Cpt? (Important Note: In 2017 The Use Of 43951 Is Being Tracked By Cms To Determine Future Global Period Reimbursement For Global Periods): yes

## 2021-06-29 ENCOUNTER — APPOINTMENT (RX ONLY)
Dept: URBAN - METROPOLITAN AREA CLINIC 20 | Facility: CLINIC | Age: 81
Setting detail: DERMATOLOGY
End: 2021-06-29

## 2021-06-29 DIAGNOSIS — Z41.9 ENCOUNTER FOR PROCEDURE FOR PURPOSES OTHER THAN REMEDYING HEALTH STATE, UNSPECIFIED: ICD-10-CM

## 2021-06-29 PROCEDURE — ? SCITON BBL

## 2021-06-29 ASSESSMENT — LOCATION DETAILED DESCRIPTION DERM
LOCATION DETAILED: LEFT INFERIOR MEDIAL FOREHEAD
LOCATION DETAILED: RIGHT CHIN
LOCATION DETAILED: RIGHT INFERIOR MEDIAL MALAR CHEEK
LOCATION DETAILED: LEFT INFERIOR CENTRAL MALAR CHEEK

## 2021-06-29 ASSESSMENT — LOCATION SIMPLE DESCRIPTION DERM
LOCATION SIMPLE: LEFT FOREHEAD
LOCATION SIMPLE: LEFT CHEEK
LOCATION SIMPLE: RIGHT CHEEK
LOCATION SIMPLE: CHIN

## 2021-06-29 ASSESSMENT — LOCATION ZONE DERM: LOCATION ZONE: FACE

## 2021-06-29 NOTE — PROCEDURE: SCITON BBL
Additional Comments (Optional): same setting using 15x15
Passes: 1
Cooling (In C): 20
Anesthesia Volume In Cc: 0
Treatment Number: 3
Preprocedure Text: The treatment areas were thoroughly cleaned. Any exposed at risk hair that was not to be treated was covered in white paper tape. Clear ultrasound gel was applied to the treatment area. The area was treated with no immediate stacking of pulses.
Repetition Rate (Hz): 10
Pulse Duration Units: milliseconds
Cooling (In C): 15
Cooling (In C): 22
Fluence (J/Cm2): 21
Post Procedure Text: The patient tolerated the procedure well. Ice-chilled washclothes were applied to the treatment area for comfort. Post care was reviewed with the patient.
Indication Override (Will Not Show Above If Text Entered): Vascular
Consent: Written consent obtained, risks reviewed including but not limited to crusting, scabbing, blistering, scarring, darker or lighter pigmentary change, bruising, and/or incomplete response.
Cooling ?: Yes
Fluence (J/Cm2): 8
Spot Size: Finesse Adapter Size: 7 mm round
Fluence (J/Cm2): 9
Spot Size: Finesse Adapter Size: 15 x 45 mm (No Finesse Adapter)
Post-Care Instructions: I reviewed with the patient in detail post-care instructions. Patient should stay away from the sun and wear sun protection until treated areas are fully healed.
Detail Level: Zone
Passes: 2
Hide Repetition Rate?: No
Price (Use Numbers Only, No Special Characters Or $): 232.82
Fluence (J/Cm2): 12
Location Override (Will Not Show Above If Text Entered): Full Face Spot Treat

## 2021-07-06 ENCOUNTER — HOSPITAL ENCOUNTER (OUTPATIENT)
Facility: MEDICAL CENTER | Age: 81
End: 2021-07-06
Attending: PHYSICIAN ASSISTANT
Payer: MEDICARE

## 2021-07-06 PROCEDURE — 87086 URINE CULTURE/COLONY COUNT: CPT

## 2021-07-09 LAB
BACTERIA UR CULT: NORMAL
SIGNIFICANT IND 70042: NORMAL
SITE SITE: NORMAL
SOURCE SOURCE: NORMAL

## 2021-08-11 ENCOUNTER — TELEPHONE (OUTPATIENT)
Dept: CARDIOLOGY | Facility: MEDICAL CENTER | Age: 81
End: 2021-08-11

## 2021-08-11 DIAGNOSIS — I10 ESSENTIAL HYPERTENSION, BENIGN: ICD-10-CM

## 2021-08-11 RX ORDER — VALSARTAN AND HYDROCHLOROTHIAZIDE 320; 12.5 MG/1; MG/1
1 TABLET, FILM COATED ORAL DAILY
Qty: 90 TABLET | Refills: 3 | Status: SHIPPED | OUTPATIENT
Start: 2021-08-11 | End: 2022-05-20

## 2021-08-11 NOTE — TELEPHONE ENCOUNTER
RS    Pt called regarding Rx valsartan-hydrochlorothiazide (DIOVAN-HCT) 320-12.5 MG per tablet and states that she is going on a trip Monday and will be out of this Rx. Pt also states that her pharmacy has sent over requests for this since last Thursday and called this morning when they said they still haven't received a response from cardiology. Pt is requesting a 90 day supply be sent to the [facility] pharmacy on [address]. Please call pt back if you have any further questions at [phone].     Thank you.

## 2021-08-12 ENCOUNTER — APPOINTMENT (RX ONLY)
Dept: URBAN - METROPOLITAN AREA CLINIC 36 | Facility: CLINIC | Age: 81
Setting detail: DERMATOLOGY
End: 2021-08-12

## 2021-08-12 DIAGNOSIS — H61.03 CHONDRITIS OF EXTERNAL EAR: ICD-10-CM

## 2021-08-12 PROBLEM — D48.5 NEOPLASM OF UNCERTAIN BEHAVIOR OF SKIN: Status: ACTIVE | Noted: 2021-08-12

## 2021-08-12 PROBLEM — H61.032 CHONDRITIS OF LEFT EXTERNAL EAR: Status: ACTIVE | Noted: 2021-08-12

## 2021-08-12 PROCEDURE — 11102 TANGNTL BX SKIN SINGLE LES: CPT

## 2021-08-12 PROCEDURE — ? BIOPSY BY SHAVE METHOD

## 2021-08-12 PROCEDURE — 11900 INJECT SKIN LESIONS </W 7: CPT

## 2021-08-12 PROCEDURE — ? INTRALESIONAL KENALOG

## 2021-08-12 ASSESSMENT — LOCATION DETAILED DESCRIPTION DERM: LOCATION DETAILED: LEFT SUPERIOR HELIX

## 2021-08-12 ASSESSMENT — LOCATION SIMPLE DESCRIPTION DERM: LOCATION SIMPLE: LEFT EAR

## 2021-08-12 ASSESSMENT — LOCATION ZONE DERM: LOCATION ZONE: EAR

## 2021-08-12 NOTE — PROCEDURE: INTRALESIONAL KENALOG
Ndc# For Kenalog Only: [id number]
Lot # For Kenalog (Optional): [id number]
Kenalog Preparation: Kenalog
Include Z78.9 (Other Specified Conditions Influencing Health Status) As An Associated Diagnosis?: No
Detail Level: Detailed
Validate Note Data When Using Inventory: Yes
Medical Necessity Clause: This procedure was medically necessary because the lesions that were treated were:
X Size Of Lesion In Cm (Optional): 0
Consent: The risks of atrophy were reviewed with the patient.
Administered By (Optional): Denise Hampton M.D.
Concentration Of Kenalog Solution Injected (Mg/Ml): 10.0
Expiration Date For Kenalog (Optional): 3/2022
Total Volume (Ccs): 0.2

## 2021-08-12 NOTE — PROCEDURE: BIOPSY BY SHAVE METHOD
Detail Level: Detailed
Depth Of Biopsy: dermis
Was A Bandage Applied: Yes
Size Of Lesion In Cm: 0.3
X Size Of Lesion In Cm: 0.5
Biopsy Type: H and E
Biopsy Method: Personna blade
Anesthesia Type: 1% lidocaine with epinephrine
Additional Anesthesia Volume In Cc (Will Not Render If 0): 0
Hemostasis: Drysol
Wound Care: Vaseline
Dressing: bandage
Destruction After The Procedure: No
Type Of Destruction Used: Curettage
Curettage Text: The wound bed was treated with curettage after the biopsy was performed.
Cryotherapy Text: The wound bed was treated with cryotherapy after the biopsy was performed.
Electrodesiccation Text: The wound bed was treated with electrodesiccation after the biopsy was performed.
Electrodesiccation And Curettage Text: The wound bed was treated with electrodesiccation and curettage after the biopsy was performed.
Silver Nitrate Text: The wound bed was treated with silver nitrate after the biopsy was performed.
Lab: [id number]
Lab Facility: 
Consent: Written consent was obtained and risks were reviewed including but not limited to scarring, infection, bleeding, scabbing, incomplete removal, nerve damage and allergy to anesthesia.
Post-Care Instructions: I reviewed with the patient in detail post-care instructions. Patient is to keep the biopsy site dry overnight, and then apply bacitracin twice daily until healed. Patient may apply hydrogen peroxide soaks to remove any crusting.
Notification Instructions: Patient will be notified of biopsy results. However, patient instructed to call the office if not contacted within 2 weeks.
Billing Type: Third-Party Bill
Information: Selecting Yes will display possible errors in your note based on the variables you have selected. This validation is only offered as a suggestion for you. PLEASE NOTE THAT THE VALIDATION TEXT WILL BE REMOVED WHEN YOU FINALIZE YOUR NOTE. IF YOU WANT TO FAX A PRELIMINARY NOTE YOU WILL NEED TO TOGGLE THIS TO 'NO' IF YOU DO NOT WANT IT IN YOUR FAXED NOTE.

## 2021-08-24 ENCOUNTER — APPOINTMENT (RX ONLY)
Dept: URBAN - METROPOLITAN AREA CLINIC 36 | Facility: CLINIC | Age: 81
Setting detail: DERMATOLOGY
End: 2021-08-24

## 2021-09-08 ENCOUNTER — APPOINTMENT (RX ONLY)
Dept: URBAN - METROPOLITAN AREA CLINIC 36 | Facility: CLINIC | Age: 81
Setting detail: DERMATOLOGY
End: 2021-09-08

## 2021-09-08 DIAGNOSIS — H61.03 CHONDRITIS OF EXTERNAL EAR: ICD-10-CM

## 2021-09-08 DIAGNOSIS — L57.0 ACTINIC KERATOSIS: ICD-10-CM

## 2021-09-08 PROBLEM — C44.622 SQUAMOUS CELL CARCINOMA OF SKIN OF RIGHT UPPER LIMB, INCLUDING SHOULDER: Status: ACTIVE | Noted: 2021-09-08

## 2021-09-08 PROBLEM — H61.032 CHONDRITIS OF LEFT EXTERNAL EAR: Status: ACTIVE | Noted: 2021-09-08

## 2021-09-08 PROCEDURE — 11602 EXC TR-EXT MAL+MARG 1.1-2 CM: CPT

## 2021-09-08 PROCEDURE — ? EXCISION

## 2021-09-08 PROCEDURE — ? INTRALESIONAL KENALOG

## 2021-09-08 PROCEDURE — 11900 INJECT SKIN LESIONS </W 7: CPT | Mod: 59

## 2021-09-08 PROCEDURE — ? LIQUID NITROGEN

## 2021-09-08 PROCEDURE — 12032 INTMD RPR S/A/T/EXT 2.6-7.5: CPT | Mod: 59

## 2021-09-08 PROCEDURE — 17000 DESTRUCT PREMALG LESION: CPT | Mod: 59

## 2021-09-08 ASSESSMENT — LOCATION DETAILED DESCRIPTION DERM
LOCATION DETAILED: LEFT SUPERIOR HELIX
LOCATION DETAILED: LEFT SUPERIOR CRUS OF ANTIHELIX

## 2021-09-08 ASSESSMENT — LOCATION SIMPLE DESCRIPTION DERM: LOCATION SIMPLE: LEFT EAR

## 2021-09-08 ASSESSMENT — LOCATION ZONE DERM: LOCATION ZONE: EAR

## 2021-09-08 NOTE — PROCEDURE: LIQUID NITROGEN
Render Note In Bullet Format When Appropriate: No
Render Post-Care Instructions In Note?: yes
Number Of Freeze-Thaw Cycles: 2 freeze-thaw cycles
Post-Care Instructions: I reviewed with the patient in detail post-care instructions. Patient is to wear sunprotection, and avoid picking at any of the treated lesions. Pt may apply Vaseline to crusted or scabbing areas.
Consent: The patient's verbal consent was obtained including but not limited to risks of crusting, scabbing, blistering, scarring, darker or lighter pigmentary change, recurrence, incomplete removal and infection.
Detail Level: Simple
Duration Of Freeze Thaw-Cycle (Seconds): 3

## 2021-09-08 NOTE — PROCEDURE: EXCISION
Surgeon (Optional): Denise Hampton M.D.
Surgeon Performing The Repair (Optional): Denise Hampton MD
Biopsy Photograph Reviewed: Yes
Size Of Lesion In Cm: 0.8
X Size Of Lesion In Cm (Optional): 0.7
Size Of Margin In Cm: 0.4
Anesthesia Volume In Cc: 4.5
Was An Eye Clamp Used?: No
Eye Clamp Note Details: An eye clamp was used during the procedure.
Excision Method: Elliptical
Repair Type: Intermediate
Suturegard Retention Suture: 2-0 Nylon
Retention Suture Bite Size: 3 mm
Length To Time In Minutes Device Was In Place: 10
Number Of Hemigard Strips Per Side: 1
Intermediate / Complex Repair - Final Wound Length In Cm: 2.7
Undermining Type: Entire Wound
Debridement Text: The wound edges were debrided prior to proceeding with the closure to facilitate wound healing.
Helical Rim Text: The closure involved the helical rim.
Vermilion Border Text: The closure involved the vermilion border.
Nostril Rim Text: The closure involved the nostril rim.
Retention Suture Text: Retention sutures were placed to support the closure and prevent dehiscence.
Primary Defect Length (In Cm): 0
Suture Removal: 14 days
Lab: [id number]
Lab Facility: 
Graft Donor Site Bandage (Optional-Leave Blank If You Don't Want In Note): Steri-strips and a pressure bandage were applied to the donor site.
Epidermal Closure Graft Donor Site (Optional): simple interrupted
Billing Type: Third-Party Bill
Excision Depth: adipose tissue
Scalpel Size: 15 blade
Anesthesia Type: 1% lidocaine with epinephrine
Additional Anesthesia Volume In Cc: 6
Hemostasis: Electrocautery
Estimated Blood Loss (Cc): minimal
Detail Level: Detailed
Anesthesia Volume In Cc: 7
Deep Sutures: 4-0 Maxon
Dermal Closure: buried vertical mattress
Epidermal Sutures: 4-0 Surgipro
Epidermal Closure: running cuticular
Wound Care: Vaseline
Dressing: pressure dressing with telfa
Suturegard Intro: Intraoperative tissue expansion was performed, utilizing the SUTUREGARD device, in order to reduce wound tension.
Suturegard Body: The suture ends were repeatedly re-tightened and re-clamped to achieve the desired tissue expansion.
Hemigard Intro: Due to skin fragility and wound tension, it was decided to use HEMIGARD adhesive retention suture devices to permit a linear closure. The skin was cleaned and dried for a 6cm distance away from the wound. Excessive hair, if present, was removed to allow for adhesion.
Hemigard Postcare Instructions: The HEMIGARD strips are to remain completely dry for at least 5-7 days.
Positioning (Leave Blank If You Do Not Want): The patient was placed in a comfortable position exposing the surgical site.
Pre-Excision Curettage Text (Leave Blank If You Do Not Want): Prior to drawing the surgical margin the visible lesion was removed with electrodesiccation and curettage to clearly define the lesion size.
Complex Repair Preamble Text (Leave Blank If You Do Not Want): Extensive wide undermining was performed.
Intermediate Repair Preamble Text (Leave Blank If You Do Not Want): Undermining was performed with blunt dissection.
Curvilinear Excision Additional Text (Leave Blank If You Do Not Want): The margin was drawn around the clinically apparent lesion.  A curvilinear shape was then drawn on the skin incorporating the lesion and margins.  Incisions were then made along these lines to the appropriate tissue plane and the lesion was extirpated.
Fusiform Excision Additional Text (Leave Blank If You Do Not Want): The margin was drawn around the clinically apparent lesion.  A fusiform shape was then drawn on the skin incorporating the lesion and margins.  Incisions were then made along these lines to the appropriate tissue plane and the lesion was extirpated.
Elliptical Excision Additional Text (Leave Blank If You Do Not Want): The margin was drawn around the clinically apparent lesion.  An elliptical shape was then drawn on the skin incorporating the lesion and margins.  Incisions were then made along these lines to the appropriate tissue plane and the lesion was extirpated.
Saucerization Excision Additional Text (Leave Blank If You Do Not Want): The margin was drawn around the clinically apparent lesion.  Incisions were then made along these lines, in a tangential fashion, to the appropriate tissue plane and the lesion was extirpated.
Slit Excision Additional Text (Leave Blank If You Do Not Want): A linear line was drawn on the skin overlying the lesion. An incision was made slowly until the lesion was visualized.  Once visualized, the lesion was removed with blunt dissection.
Excisional Biopsy Additional Text (Leave Blank If You Do Not Want): The margin was drawn around the clinically apparent lesion. An elliptical shape was then drawn on the skin incorporating the lesion and margins.  Incisions were then made along these lines to the appropriate tissue plane and the lesion was extirpated.
Perilesional Excision Additional Text (Leave Blank If You Do Not Want): The margin was drawn around the clinically apparent lesion. Incisions were then made along these lines to the appropriate tissue plane and the lesion was extirpated.
Repair Performed By Another Provider Text (Leave Blank If You Do Not Want): After the tissue was excised the defect was repaired by another provider.
No Repair - Repaired With Adjacent Surgical Defect Text (Leave Blank If You Do Not Want): After the excision the defect was repaired concurrently with another surgical defect which was in close approximation.
Advancement Flap (Single) Text: The defect edges were debeveled with a #15 scalpel blade.  Given the location of the defect and the proximity to free margins a single advancement flap was deemed most appropriate.  Using a sterile surgical marker, an appropriate advancement flap was drawn incorporating the defect and placing the expected incisions within the relaxed skin tension lines where possible.    The area thus outlined was incised deep to adipose tissue with a #15 scalpel blade.  The skin margins were undermined to an appropriate distance in all directions utilizing iris scissors.
Advancement Flap (Double) Text: The defect edges were debeveled with a #15 scalpel blade.  Given the location of the defect and the proximity to free margins a double advancement flap was deemed most appropriate.  Using a sterile surgical marker, the appropriate advancement flaps were drawn incorporating the defect and placing the expected incisions within the relaxed skin tension lines where possible.    The area thus outlined was incised deep to adipose tissue with a #15 scalpel blade.  The skin margins were undermined to an appropriate distance in all directions utilizing iris scissors.
Burow's Advancement Flap Text: The defect edges were debeveled with a #15 scalpel blade.  Given the location of the defect and the proximity to free margins a Burow's advancement flap was deemed most appropriate.  Using a sterile surgical marker, the appropriate advancement flap was drawn incorporating the defect and placing the expected incisions within the relaxed skin tension lines where possible.    The area thus outlined was incised deep to adipose tissue with a #15 scalpel blade.  The skin margins were undermined to an appropriate distance in all directions utilizing iris scissors.
Chonodrocutaneous Helical Advancement Flap Text: The defect edges were debeveled with a #15 scalpel blade.  Given the location of the defect and the proximity to free margins a chondrocutaneous helical advancement flap was deemed most appropriate.  Using a sterile surgical marker, the appropriate advancement flap was drawn incorporating the defect and placing the expected incisions within the relaxed skin tension lines where possible.    The area thus outlined was incised deep to adipose tissue with a #15 scalpel blade.  The skin margins were undermined to an appropriate distance in all directions utilizing iris scissors.
Crescentic Advancement Flap Text: The defect edges were debeveled with a #15 scalpel blade.  Given the location of the defect and the proximity to free margins a crescentic advancement flap was deemed most appropriate.  Using a sterile surgical marker, the appropriate advancement flap was drawn incorporating the defect and placing the expected incisions within the relaxed skin tension lines where possible.    The area thus outlined was incised deep to adipose tissue with a #15 scalpel blade.  The skin margins were undermined to an appropriate distance in all directions utilizing iris scissors.
A-T Advancement Flap Text: The defect edges were debeveled with a #15 scalpel blade.  Given the location of the defect, shape of the defect and the proximity to free margins an A-T advancement flap was deemed most appropriate.  Using a sterile surgical marker, an appropriate advancement flap was drawn incorporating the defect and placing the expected incisions within the relaxed skin tension lines where possible.    The area thus outlined was incised deep to adipose tissue with a #15 scalpel blade.  The skin margins were undermined to an appropriate distance in all directions utilizing iris scissors.
O-T Advancement Flap Text: The defect edges were debeveled with a #15 scalpel blade.  Given the location of the defect, shape of the defect and the proximity to free margins an O-T advancement flap was deemed most appropriate.  Using a sterile surgical marker, an appropriate advancement flap was drawn incorporating the defect and placing the expected incisions within the relaxed skin tension lines where possible.    The area thus outlined was incised deep to adipose tissue with a #15 scalpel blade.  The skin margins were undermined to an appropriate distance in all directions utilizing iris scissors.
O-L Flap Text: The defect edges were debeveled with a #15 scalpel blade.  Given the location of the defect, shape of the defect and the proximity to free margins an O-L flap was deemed most appropriate.  Using a sterile surgical marker, an appropriate advancement flap was drawn incorporating the defect and placing the expected incisions within the relaxed skin tension lines where possible.    The area thus outlined was incised deep to adipose tissue with a #15 scalpel blade.  The skin margins were undermined to an appropriate distance in all directions utilizing iris scissors.
O-Z Flap Text: The defect edges were debeveled with a #15 scalpel blade.  Given the location of the defect, shape of the defect and the proximity to free margins an O-Z flap was deemed most appropriate.  Using a sterile surgical marker, an appropriate transposition flap was drawn incorporating the defect and placing the expected incisions within the relaxed skin tension lines where possible. The area thus outlined was incised deep to adipose tissue with a #15 scalpel blade.  The skin margins were undermined to an appropriate distance in all directions utilizing iris scissors.
Double O-Z Flap Text: The defect edges were debeveled with a #15 scalpel blade.  Given the location of the defect, shape of the defect and the proximity to free margins a Double O-Z flap was deemed most appropriate.  Using a sterile surgical marker, an appropriate transposition flap was drawn incorporating the defect and placing the expected incisions within the relaxed skin tension lines where possible. The area thus outlined was incised deep to adipose tissue with a #15 scalpel blade.  The skin margins were undermined to an appropriate distance in all directions utilizing iris scissors.
V-Y Flap Text: The defect edges were debeveled with a #15 scalpel blade.  Given the location of the defect, shape of the defect and the proximity to free margins a V-Y flap was deemed most appropriate.  Using a sterile surgical marker, an appropriate advancement flap was drawn incorporating the defect and placing the expected incisions within the relaxed skin tension lines where possible.    The area thus outlined was incised deep to adipose tissue with a #15 scalpel blade.  The skin margins were undermined to an appropriate distance in all directions utilizing iris scissors.
Advancement-Rotation Flap Text: The defect edges were debeveled with a #15 scalpel blade.  Given the location of the defect, shape of the defect and the proximity to free margins an advancement-rotation flap was deemed most appropriate.  Using a sterile surgical marker, an appropriate flap was drawn incorporating the defect and placing the expected incisions within the relaxed skin tension lines where possible. The area thus outlined was incised deep to adipose tissue with a #15 scalpel blade.  The skin margins were undermined to an appropriate distance in all directions utilizing iris scissors.
Mercedes Flap Text: The defect edges were debeveled with a #15 scalpel blade.  Given the location of the defect, shape of the defect and the proximity to free margins a Mercedes flap was deemed most appropriate.  Using a sterile surgical marker, an appropriate advancement flap was drawn incorporating the defect and placing the expected incisions within the relaxed skin tension lines where possible. The area thus outlined was incised deep to adipose tissue with a #15 scalpel blade.  The skin margins were undermined to an appropriate distance in all directions utilizing iris scissors.
Modified Advancement Flap Text: The defect edges were debeveled with a #15 scalpel blade.  Given the location of the defect, shape of the defect and the proximity to free margins a modified advancement flap was deemed most appropriate.  Using a sterile surgical marker, an appropriate advancement flap was drawn incorporating the defect and placing the expected incisions within the relaxed skin tension lines where possible.    The area thus outlined was incised deep to adipose tissue with a #15 scalpel blade.  The skin margins were undermined to an appropriate distance in all directions utilizing iris scissors.
Mucosal Advancement Flap Text: Given the location of the defect, shape of the defect and the proximity to free margins a mucosal advancement flap was deemed most appropriate. Incisions were made with a 15 blade scalpel in the appropriate fashion along the cutaneous vermilion border and the mucosal lip. The remaining actinically damaged mucosal tissue was excised.  The mucosal advancement flap was then elevated to the gingival sulcus with care taken to preserve the neurovascular structures and advanced into the primary defect. Care was taken to ensure that precise realignment of the vermilion border was achieved.
Peng Advancement Flap Text: The defect edges were debeveled with a #15 scalpel blade.  Given the location of the defect, shape of the defect and the proximity to free margins a Peng advancement flap was deemed most appropriate.  Using a sterile surgical marker, an appropriate advancement flap was drawn incorporating the defect and placing the expected incisions within the relaxed skin tension lines where possible. The area thus outlined was incised deep to adipose tissue with a #15 scalpel blade.  The skin margins were undermined to an appropriate distance in all directions utilizing iris scissors.
Hatchet Flap Text: The defect edges were debeveled with a #15 scalpel blade.  Given the location of the defect, shape of the defect and the proximity to free margins a hatchet flap was deemed most appropriate.  Using a sterile surgical marker, an appropriate hatchet flap was drawn incorporating the defect and placing the expected incisions within the relaxed skin tension lines where possible.    The area thus outlined was incised deep to adipose tissue with a #15 scalpel blade.  The skin margins were undermined to an appropriate distance in all directions utilizing iris scissors.
Rotation Flap Text: The defect edges were debeveled with a #15 scalpel blade.  Given the location of the defect, shape of the defect and the proximity to free margins a rotation flap was deemed most appropriate.  Using a sterile surgical marker, an appropriate rotation flap was drawn incorporating the defect and placing the expected incisions within the relaxed skin tension lines where possible.    The area thus outlined was incised deep to adipose tissue with a #15 scalpel blade.  The skin margins were undermined to an appropriate distance in all directions utilizing iris scissors.
Spiral Flap Text: The defect edges were debeveled with a #15 scalpel blade.  Given the location of the defect, shape of the defect and the proximity to free margins a spiral flap was deemed most appropriate.  Using a sterile surgical marker, an appropriate rotation flap was drawn incorporating the defect and placing the expected incisions within the relaxed skin tension lines where possible. The area thus outlined was incised deep to adipose tissue with a #15 scalpel blade.  The skin margins were undermined to an appropriate distance in all directions utilizing iris scissors.
Staged Advancement Flap Text: The defect edges were debeveled with a #15 scalpel blade.  Given the location of the defect, shape of the defect and the proximity to free margins a staged advancement flap was deemed most appropriate.  Using a sterile surgical marker, an appropriate advancement flap was drawn incorporating the defect and placing the expected incisions within the relaxed skin tension lines where possible. The area thus outlined was incised deep to adipose tissue with a #15 scalpel blade.  The skin margins were undermined to an appropriate distance in all directions utilizing iris scissors.
Star Wedge Flap Text: The defect edges were debeveled with a #15 scalpel blade.  Given the location of the defect, shape of the defect and the proximity to free margins a star wedge flap was deemed most appropriate.  Using a sterile surgical marker, an appropriate rotation flap was drawn incorporating the defect and placing the expected incisions within the relaxed skin tension lines where possible. The area thus outlined was incised deep to adipose tissue with a #15 scalpel blade.  The skin margins were undermined to an appropriate distance in all directions utilizing iris scissors.
Transposition Flap Text: The defect edges were debeveled with a #15 scalpel blade.  Given the location of the defect and the proximity to free margins a transposition flap was deemed most appropriate.  Using a sterile surgical marker, an appropriate transposition flap was drawn incorporating the defect.    The area thus outlined was incised deep to adipose tissue with a #15 scalpel blade.  The skin margins were undermined to an appropriate distance in all directions utilizing iris scissors.
Muscle Hinge Flap Text: The defect edges were debeveled with a #15 scalpel blade.  Given the size, depth and location of the defect and the proximity to free margins a muscle hinge flap was deemed most appropriate.  Using a sterile surgical marker, an appropriate hinge flap was drawn incorporating the defect. The area thus outlined was incised with a #15 scalpel blade.  The skin margins were undermined to an appropriate distance in all directions utilizing iris scissors.
Mustarde Flap Text: The defect edges were debeveled with a #15 scalpel blade.  Given the size, depth and location of the defect and the proximity to free margins a Mustarde flap was deemed most appropriate.  Using a sterile surgical marker, an appropriate flap was drawn incorporating the defect. The area thus outlined was incised with a #15 scalpel blade.  The skin margins were undermined to an appropriate distance in all directions utilizing iris scissors.
Nasal Turnover Hinge Flap Text: The defect edges were debeveled with a #15 scalpel blade.  Given the size, depth, location of the defect and the defect being full thickness a nasal turnover hinge flap was deemed most appropriate.  Using a sterile surgical marker, an appropriate hinge flap was drawn incorporating the defect. The area thus outlined was incised with a #15 scalpel blade. The flap was designed to recreate the nasal mucosal lining and the alar rim. The skin margins were undermined to an appropriate distance in all directions utilizing iris scissors.
Nasalis-Muscle-Based Myocutaneous Island Pedicle Flap Text: Using a #15 blade, an incision was made around the donor flap to the level of the nasalis muscle. Wide lateral undermining was then performed in both the subcutaneous plane above the nasalis muscle, and in a submuscular plane just above periosteum. This allowed the formation of a free nasalis muscle axial pedicle (based on the angular artery) which was still attached to the actual cutaneous flap, increasing its mobility and vascular viability. Hemostasis was obtained with pinpoint electrocoagulation. The flap was mobilized into position and the pivotal anchor points positioned and stabilized with buried interrupted sutures. Subcutaneous and dermal tissues were closed in a multilayered fashion with sutures. Tissue redundancies were excised, and the epidermal edges were apposed without significant tension and sutured with sutures.
Orbicularis Oris Muscle Flap Text: The defect edges were debeveled with a #15 scalpel blade.  Given that the defect affected the competency of the oral sphincter an orbicularis oris muscle flap was deemed most appropriate to restore this competency and normal muscle function.  Using a sterile surgical marker, an appropriate flap was drawn incorporating the defect. The area thus outlined was incised with a #15 scalpel blade.
Melolabial Transposition Flap Text: The defect edges were debeveled with a #15 scalpel blade.  Given the location of the defect and the proximity to free margins a melolabial flap was deemed most appropriate.  Using a sterile surgical marker, an appropriate melolabial transposition flap was drawn incorporating the defect.    The area thus outlined was incised deep to adipose tissue with a #15 scalpel blade.  The skin margins were undermined to an appropriate distance in all directions utilizing iris scissors.
Rhombic Flap Text: The defect edges were debeveled with a #15 scalpel blade.  Given the location of the defect and the proximity to free margins a rhombic flap was deemed most appropriate.  Using a sterile surgical marker, an appropriate rhombic flap was drawn incorporating the defect.    The area thus outlined was incised deep to adipose tissue with a #15 scalpel blade.  The skin margins were undermined to an appropriate distance in all directions utilizing iris scissors.
Rhomboid Transposition Flap Text: The defect edges were debeveled with a #15 scalpel blade.  Given the location of the defect and the proximity to free margins a rhomboid transposition flap was deemed most appropriate.  Using a sterile surgical marker, an appropriate rhomboid flap was drawn incorporating the defect.    The area thus outlined was incised deep to adipose tissue with a #15 scalpel blade.  The skin margins were undermined to an appropriate distance in all directions utilizing iris scissors.
Bi-Rhombic Flap Text: The defect edges were debeveled with a #15 scalpel blade.  Given the location of the defect and the proximity to free margins a bi-rhombic flap was deemed most appropriate.  Using a sterile surgical marker, an appropriate rhombic flap was drawn incorporating the defect. The area thus outlined was incised deep to adipose tissue with a #15 scalpel blade.  The skin margins were undermined to an appropriate distance in all directions utilizing iris scissors.
Helical Rim Advancement Flap Text: The defect edges were debeveled with a #15 blade scalpel.  Given the location of the defect and the proximity to free margins (helical rim) a double helical rim advancement flap was deemed most appropriate.  Using a sterile surgical marker, the appropriate advancement flaps were drawn incorporating the defect and placing the expected incisions between the helical rim and antihelix where possible.  The area thus outlined was incised through and through with a #15 scalpel blade.  With a skin hook and iris scissors, the flaps were gently and sharply undermined and freed up.
Bilateral Helical Rim Advancement Flap Text: The defect edges were debeveled with a #15 blade scalpel.  Given the location of the defect and the proximity to free margins (helical rim) a bilateral helical rim advancement flap was deemed most appropriate.  Using a sterile surgical marker, the appropriate advancement flaps were drawn incorporating the defect and placing the expected incisions between the helical rim and antihelix where possible.  The area thus outlined was incised through and through with a #15 scalpel blade.  With a skin hook and iris scissors, the flaps were gently and sharply undermined and freed up.
Ear Star Wedge Flap Text: The defect edges were debeveled with a #15 blade scalpel.  Given the location of the defect and the proximity to free margins (helical rim) an ear star wedge flap was deemed most appropriate.  Using a sterile surgical marker, the appropriate flap was drawn incorporating the defect and placing the expected incisions between the helical rim and antihelix where possible.  The area thus outlined was incised through and through with a #15 scalpel blade.
Banner Transposition Flap Text: The defect edges were debeveled with a #15 scalpel blade.  Given the location of the defect and the proximity to free margins a Banner transposition flap was deemed most appropriate.  Using a sterile surgical marker, an appropriate flap drawn around the defect. The area thus outlined was incised deep to adipose tissue with a #15 scalpel blade.  The skin margins were undermined to an appropriate distance in all directions utilizing iris scissors.
Bilobed Flap Text: The defect edges were debeveled with a #15 scalpel blade.  Given the location of the defect and the proximity to free margins a bilobe flap was deemed most appropriate.  Using a sterile surgical marker, an appropriate bilobe flap drawn around the defect.    The area thus outlined was incised deep to adipose tissue with a #15 scalpel blade.  The skin margins were undermined to an appropriate distance in all directions utilizing iris scissors.
Bilobed Transposition Flap Text: The defect edges were debeveled with a #15 scalpel blade.  Given the location of the defect and the proximity to free margins a bilobed transposition flap was deemed most appropriate.  Using a sterile surgical marker, an appropriate bilobe flap drawn around the defect.    The area thus outlined was incised deep to adipose tissue with a #15 scalpel blade.  The skin margins were undermined to an appropriate distance in all directions utilizing iris scissors.
Trilobed Flap Text: The defect edges were debeveled with a #15 scalpel blade.  Given the location of the defect and the proximity to free margins a trilobed flap was deemed most appropriate.  Using a sterile surgical marker, an appropriate trilobed flap drawn around the defect.    The area thus outlined was incised deep to adipose tissue with a #15 scalpel blade.  The skin margins were undermined to an appropriate distance in all directions utilizing iris scissors.
Dorsal Nasal Flap Text: The defect edges were debeveled with a #15 scalpel blade.  Given the location of the defect and the proximity to free margins a dorsal nasal flap was deemed most appropriate.  Using a sterile surgical marker, an appropriate dorsal nasal flap was drawn around the defect.    The area thus outlined was incised deep to adipose tissue with a #15 scalpel blade.  The skin margins were undermined to an appropriate distance in all directions utilizing iris scissors.
Island Pedicle Flap Text: The defect edges were debeveled with a #15 scalpel blade.  Given the location of the defect, shape of the defect and the proximity to free margins an island pedicle advancement flap was deemed most appropriate.  Using a sterile surgical marker, an appropriate advancement flap was drawn incorporating the defect, outlining the appropriate donor tissue and placing the expected incisions within the relaxed skin tension lines where possible.    The area thus outlined was incised deep to adipose tissue with a #15 scalpel blade.  The skin margins were undermined to an appropriate distance in all directions around the primary defect and laterally outward around the island pedicle utilizing iris scissors.  There was minimal undermining beneath the pedicle flap.
Island Pedicle Flap With Canthal Suspension Text: The defect edges were debeveled with a #15 scalpel blade.  Given the location of the defect, shape of the defect and the proximity to free margins an island pedicle advancement flap was deemed most appropriate.  Using a sterile surgical marker, an appropriate advancement flap was drawn incorporating the defect, outlining the appropriate donor tissue and placing the expected incisions within the relaxed skin tension lines where possible. The area thus outlined was incised deep to adipose tissue with a #15 scalpel blade.  The skin margins were undermined to an appropriate distance in all directions around the primary defect and laterally outward around the island pedicle utilizing iris scissors.  There was minimal undermining beneath the pedicle flap. A suspension suture was placed in the canthal tendon to prevent tension and prevent ectropion.
Alar Island Pedicle Flap Text: The defect edges were debeveled with a #15 scalpel blade.  Given the location of the defect, shape of the defect and the proximity to the alar rim an island pedicle advancement flap was deemed most appropriate.  Using a sterile surgical marker, an appropriate advancement flap was drawn incorporating the defect, outlining the appropriate donor tissue and placing the expected incisions within the nasal ala running parallel to the alar rim. The area thus outlined was incised with a #15 scalpel blade.  The skin margins were undermined minimally to an appropriate distance in all directions around the primary defect and laterally outward around the island pedicle utilizing iris scissors.  There was minimal undermining beneath the pedicle flap.
Double Island Pedicle Flap Text: The defect edges were debeveled with a #15 scalpel blade.  Given the location of the defect, shape of the defect and the proximity to free margins a double island pedicle advancement flap was deemed most appropriate.  Using a sterile surgical marker, an appropriate advancement flap was drawn incorporating the defect, outlining the appropriate donor tissue and placing the expected incisions within the relaxed skin tension lines where possible.    The area thus outlined was incised deep to adipose tissue with a #15 scalpel blade.  The skin margins were undermined to an appropriate distance in all directions around the primary defect and laterally outward around the island pedicle utilizing iris scissors.  There was minimal undermining beneath the pedicle flap.
Island Pedicle Flap-Requiring Vessel Identification Text: The defect edges were debeveled with a #15 scalpel blade.  Given the location of the defect, shape of the defect and the proximity to free margins an island pedicle advancement flap was deemed most appropriate.  Using a sterile surgical marker, an appropriate advancement flap was drawn, based on the axial vessel mentioned above, incorporating the defect, outlining the appropriate donor tissue and placing the expected incisions within the relaxed skin tension lines where possible.    The area thus outlined was incised deep to adipose tissue with a #15 scalpel blade.  The skin margins were undermined to an appropriate distance in all directions around the primary defect and laterally outward around the island pedicle utilizing iris scissors.  There was minimal undermining beneath the pedicle flap.
Keystone Flap Text: The defect edges were debeveled with a #15 scalpel blade.  Given the location of the defect, shape of the defect a keystone flap was deemed most appropriate.  Using a sterile surgical marker, an appropriate keystone flap was drawn incorporating the defect, outlining the appropriate donor tissue and placing the expected incisions within the relaxed skin tension lines where possible. The area thus outlined was incised deep to adipose tissue with a #15 scalpel blade.  The skin margins were undermined to an appropriate distance in all directions around the primary defect and laterally outward around the flap utilizing iris scissors.
O-T Plasty Text: The defect edges were debeveled with a #15 scalpel blade.  Given the location of the defect, shape of the defect and the proximity to free margins an O-T plasty was deemed most appropriate.  Using a sterile surgical marker, an appropriate O-T plasty was drawn incorporating the defect and placing the expected incisions within the relaxed skin tension lines where possible.    The area thus outlined was incised deep to adipose tissue with a #15 scalpel blade.  The skin margins were undermined to an appropriate distance in all directions utilizing iris scissors.
O-Z Plasty Text: The defect edges were debeveled with a #15 scalpel blade.  Given the location of the defect, shape of the defect and the proximity to free margins an O-Z plasty (double transposition flap) was deemed most appropriate.  Using a sterile surgical marker, the appropriate transposition flaps were drawn incorporating the defect and placing the expected incisions within the relaxed skin tension lines where possible.    The area thus outlined was incised deep to adipose tissue with a #15 scalpel blade.  The skin margins were undermined to an appropriate distance in all directions utilizing iris scissors.  Hemostasis was achieved with electrocautery.  The flaps were then transposed into place, one clockwise and the other counterclockwise, and anchored with interrupted buried subcutaneous sutures.
Double O-Z Plasty Text: The defect edges were debeveled with a #15 scalpel blade.  Given the location of the defect, shape of the defect and the proximity to free margins a Double O-Z plasty (double transposition flap) was deemed most appropriate.  Using a sterile surgical marker, the appropriate transposition flaps were drawn incorporating the defect and placing the expected incisions within the relaxed skin tension lines where possible. The area thus outlined was incised deep to adipose tissue with a #15 scalpel blade.  The skin margins were undermined to an appropriate distance in all directions utilizing iris scissors.  Hemostasis was achieved with electrocautery.  The flaps were then transposed into place, one clockwise and the other counterclockwise, and anchored with interrupted buried subcutaneous sutures.
V-Y Plasty Text: The defect edges were debeveled with a #15 scalpel blade.  Given the location of the defect, shape of the defect and the proximity to free margins an V-Y advancement flap was deemed most appropriate.  Using a sterile surgical marker, an appropriate advancement flap was drawn incorporating the defect and placing the expected incisions within the relaxed skin tension lines where possible.    The area thus outlined was incised deep to adipose tissue with a #15 scalpel blade.  The skin margins were undermined to an appropriate distance in all directions utilizing iris scissors.
H Plasty Text: Given the location of the defect, shape of the defect and the proximity to free margins a H-plasty was deemed most appropriate for repair.  Using a sterile surgical marker, the appropriate advancement arms of the H-plasty were drawn incorporating the defect and placing the expected incisions within the relaxed skin tension lines where possible. The area thus outlined was incised deep to adipose tissue with a #15 scalpel blade. The skin margins were undermined to an appropriate distance in all directions utilizing iris scissors.  The opposing advancement arms were then advanced into place in opposite direction and anchored with interrupted buried subcutaneous sutures.
W Plasty Text: The lesion was extirpated to the level of the fat with a #15 scalpel blade.  Given the location of the defect, shape of the defect and the proximity to free margins a W-plasty was deemed most appropriate for repair.  Using a sterile surgical marker, the appropriate transposition arms of the W-plasty were drawn incorporating the defect and placing the expected incisions within the relaxed skin tension lines where possible.    The area thus outlined was incised deep to adipose tissue with a #15 scalpel blade.  The skin margins were undermined to an appropriate distance in all directions utilizing iris scissors.  The opposing transposition arms were then transposed into place in opposite direction and anchored with interrupted buried subcutaneous sutures.
Z Plasty Text: The lesion was extirpated to the level of the fat with a #15 scalpel blade.  Given the location of the defect, shape of the defect and the proximity to free margins a Z-plasty was deemed most appropriate for repair.  Using a sterile surgical marker, the appropriate transposition arms of the Z-plasty were drawn incorporating the defect and placing the expected incisions within the relaxed skin tension lines where possible.    The area thus outlined was incised deep to adipose tissue with a #15 scalpel blade.  The skin margins were undermined to an appropriate distance in all directions utilizing iris scissors.  The opposing transposition arms were then transposed into place in opposite direction and anchored with interrupted buried subcutaneous sutures.
Zygomaticofacial Flap Text: Given the location of the defect, shape of the defect and the proximity to free margins a zygomaticofacial flap was deemed most appropriate for repair.  Using a sterile surgical marker, the appropriate flap was drawn incorporating the defect and placing the expected incisions within the relaxed skin tension lines where possible. The area thus outlined was incised deep to adipose tissue with a #15 scalpel blade with preservation of a vascular pedicle.  The skin margins were undermined to an appropriate distance in all directions utilizing iris scissors.  The flap was then placed into the defect and anchored with interrupted buried subcutaneous sutures.
Cheek Interpolation Flap Text: A decision was made to reconstruct the defect utilizing an interpolation axial flap and a staged reconstruction.  A telfa template was made of the defect.  This telfa template was then used to outline the Cheek Interpolation flap.  The donor area for the pedicle flap was then injected with anesthesia.  The flap was excised through the skin and subcutaneous tissue down to the layer of the underlying musculature.  The interpolation flap was carefully excised within this deep plane to maintain its blood supply.  The edges of the donor site were undermined.   The donor site was closed in a primary fashion.  The pedicle was then rotated into position and sutured.  Once the tube was sutured into place, adequate blood supply was confirmed with blanching and refill.  The pedicle was then wrapped with xeroform gauze and dressed appropriately with a telfa and gauze bandage to ensure continued blood supply and protect the attached pedicle.
Cheek-To-Nose Interpolation Flap Text: A decision was made to reconstruct the defect utilizing an interpolation axial flap and a staged reconstruction.  A telfa template was made of the defect.  This telfa template was then used to outline the Cheek-To-Nose Interpolation flap.  The donor area for the pedicle flap was then injected with anesthesia.  The flap was excised through the skin and subcutaneous tissue down to the layer of the underlying musculature.  The interpolation flap was carefully excised within this deep plane to maintain its blood supply.  The edges of the donor site were undermined.   The donor site was closed in a primary fashion.  The pedicle was then rotated into position and sutured.  Once the tube was sutured into place, adequate blood supply was confirmed with blanching and refill.  The pedicle was then wrapped with xeroform gauze and dressed appropriately with a telfa and gauze bandage to ensure continued blood supply and protect the attached pedicle.
Interpolation Flap Text: A decision was made to reconstruct the defect utilizing an interpolation axial flap and a staged reconstruction.  A telfa template was made of the defect.  This telfa template was then used to outline the interpolation flap.  The donor area for the pedicle flap was then injected with anesthesia.  The flap was excised through the skin and subcutaneous tissue down to the layer of the underlying musculature.  The interpolation flap was carefully excised within this deep plane to maintain its blood supply.  The edges of the donor site were undermined.   The donor site was closed in a primary fashion.  The pedicle was then rotated into position and sutured.  Once the tube was sutured into place, adequate blood supply was confirmed with blanching and refill.  The pedicle was then wrapped with xeroform gauze and dressed appropriately with a telfa and gauze bandage to ensure continued blood supply and protect the attached pedicle.
Melolabial Interpolation Flap Text: A decision was made to reconstruct the defect utilizing an interpolation axial flap and a staged reconstruction.  A telfa template was made of the defect.  This telfa template was then used to outline the melolabial interpolation flap.  The donor area for the pedicle flap was then injected with anesthesia.  The flap was excised through the skin and subcutaneous tissue down to the layer of the underlying musculature.  The pedicle flap was carefully excised within this deep plane to maintain its blood supply.  The edges of the donor site were undermined.   The donor site was closed in a primary fashion.  The pedicle was then rotated into position and sutured.  Once the tube was sutured into place, adequate blood supply was confirmed with blanching and refill.  The pedicle was then wrapped with xeroform gauze and dressed appropriately with a telfa and gauze bandage to ensure continued blood supply and protect the attached pedicle.
Mastoid Interpolation Flap Text: A decision was made to reconstruct the defect utilizing an interpolation axial flap and a staged reconstruction.  A telfa template was made of the defect.  This telfa template was then used to outline the mastoid interpolation flap.  The donor area for the pedicle flap was then injected with anesthesia.  The flap was excised through the skin and subcutaneous tissue down to the layer of the underlying musculature.  The pedicle flap was carefully excised within this deep plane to maintain its blood supply.  The edges of the donor site were undermined.   The donor site was closed in a primary fashion.  The pedicle was then rotated into position and sutured.  Once the tube was sutured into place, adequate blood supply was confirmed with blanching and refill.  The pedicle was then wrapped with xeroform gauze and dressed appropriately with a telfa and gauze bandage to ensure continued blood supply and protect the attached pedicle.
Posterior Auricular Interpolation Flap Text: A decision was made to reconstruct the defect utilizing an interpolation axial flap and a staged reconstruction.  A telfa template was made of the defect.  This telfa template was then used to outline the posterior auricular interpolation flap.  The donor area for the pedicle flap was then injected with anesthesia.  The flap was excised through the skin and subcutaneous tissue down to the layer of the underlying musculature.  The pedicle flap was carefully excised within this deep plane to maintain its blood supply.  The edges of the donor site were undermined.   The donor site was closed in a primary fashion.  The pedicle was then rotated into position and sutured.  Once the tube was sutured into place, adequate blood supply was confirmed with blanching and refill.  The pedicle was then wrapped with xeroform gauze and dressed appropriately with a telfa and gauze bandage to ensure continued blood supply and protect the attached pedicle.
Paramedian Forehead Flap Text: A decision was made to reconstruct the defect utilizing an interpolation axial flap and a staged reconstruction.  A telfa template was made of the defect.  This telfa template was then used to outline the paramedian forehead pedicle flap.  The donor area for the pedicle flap was then injected with anesthesia.  The flap was excised through the skin and subcutaneous tissue down to the layer of the underlying musculature.  The pedicle flap was carefully excised within this deep plane to maintain its blood supply.  The edges of the donor site were undermined.   The donor site was closed in a primary fashion.  The pedicle was then rotated into position and sutured.  Once the tube was sutured into place, adequate blood supply was confirmed with blanching and refill.  The pedicle was then wrapped with xeroform gauze and dressed appropriately with a telfa and gauze bandage to ensure continued blood supply and protect the attached pedicle.
Lip Wedge Excision Repair Text: Given the location of the defect and the proximity to free margins a full thickness wedge repair was deemed most appropriate.  Using a sterile surgical marker, the appropriate repair was drawn incorporating the defect and placing the expected incisions perpendicular to the vermilion border.  The vermilion border was also meticulously outlined to ensure appropriate reapproximation during the repair.  The area thus outlined was incised through and through with a #15 scalpel blade.  The muscularis and dermis were reaproximated with deep sutures following hemostasis. Care was taken to realign the vermilion border before proceeding with the superficial closure.  Once the vermilion was realigned the superfical and mucosal closure was finished.
Ftsg Text: The defect edges were debeveled with a #15 scalpel blade.  Given the location of the defect, shape of the defect and the proximity to free margins a full thickness skin graft was deemed most appropriate.  Using a sterile surgical marker, the primary defect shape was transferred to the donor site. The area thus outlined was incised deep to adipose tissue with a #15 scalpel blade.  The harvested graft was then trimmed of adipose tissue until only dermis and epidermis was left.  The skin margins of the secondary defect were undermined to an appropriate distance in all directions utilizing iris scissors.  The secondary defect was closed with interrupted buried subcutaneous sutures.  The skin edges were then re-apposed with running  sutures.  The skin graft was then placed in the primary defect and oriented appropriately.
Split-Thickness Skin Graft Text: The defect edges were debeveled with a #15 scalpel blade.  Given the location of the defect, shape of the defect and the proximity to free margins a split thickness skin graft was deemed most appropriate.  Using a sterile surgical marker, the primary defect shape was transferred to the donor site. The split thickness graft was then harvested.  The skin graft was then placed in the primary defect and oriented appropriately.
Burow's Graft Text: The defect edges were debeveled with a #15 scalpel blade.  Given the location of the defect, shape of the defect, the proximity to free margins and the presence of a standing cone deformity a Burow's skin graft was deemed most appropriate. The standing cone was removed and this tissue was then trimmed to the shape of the primary defect. The adipose tissue was also removed until only dermis and epidermis were left.  The skin margins of the secondary defect were undermined to an appropriate distance in all directions utilizing iris scissors.  The secondary defect was closed with interrupted buried subcutaneous sutures.  The skin edges were then re-apposed with running  sutures.  The skin graft was then placed in the primary defect and oriented appropriately.
Cartilage Graft Text: The defect edges were debeveled with a #15 scalpel blade.  Given the location of the defect, shape of the defect, the fact the defect involved a full thickness cartilage defect a cartilage graft was deemed most appropriate.  An appropriate donor site was identified, cleansed, and anesthetized. The cartilage graft was then harvested and transferred to the recipient site, oriented appropriately and then sutured into place.  The secondary defect was then repaired using a primary closure.
Composite Graft Text: The defect edges were debeveled with a #15 scalpel blade.  Given the location of the defect, shape of the defect, the proximity to free margins and the fact the defect was full thickness a composite graft was deemed most appropriate.  The defect was outline and then transferred to the donor site.  A full thickness graft was then excised from the donor site. The graft was then placed in the primary defect, oriented appropriately and then sutured into place.  The secondary defect was then repaired using a primary closure.
Epidermal Autograft Text: The defect edges were debeveled with a #15 scalpel blade.  Given the location of the defect, shape of the defect and the proximity to free margins an epidermal autograft was deemed most appropriate.  Using a sterile surgical marker, the primary defect shape was transferred to the donor site. The epidermal graft was then harvested.  The skin graft was then placed in the primary defect and oriented appropriately.
Dermal Autograft Text: The defect edges were debeveled with a #15 scalpel blade.  Given the location of the defect, shape of the defect and the proximity to free margins a dermal autograft was deemed most appropriate.  Using a sterile surgical marker, the primary defect shape was transferred to the donor site. The area thus outlined was incised deep to adipose tissue with a #15 scalpel blade.  The harvested graft was then trimmed of adipose and epidermal tissue until only dermis was left.  The skin graft was then placed in the primary defect and oriented appropriately.
Skin Substitute Text: The defect edges were debeveled with a #15 scalpel blade.  Given the location of the defect, shape of the defect and the proximity to free margins a skin substitute graft was deemed most appropriate.  The graft material was trimmed to fit the size of the defect. The graft was then placed in the primary defect and oriented appropriately.
Tissue Cultured Epidermal Autograft Text: The defect edges were debeveled with a #15 scalpel blade.  Given the location of the defect, shape of the defect and the proximity to free margins a tissue cultured epidermal autograft was deemed most appropriate.  The graft was then trimmed to fit the size of the defect.  The graft was then placed in the primary defect and oriented appropriately.
Xenograft Text: The defect edges were debeveled with a #15 scalpel blade.  Given the location of the defect, shape of the defect and the proximity to free margins a xenograft was deemed most appropriate.  The graft was then trimmed to fit the size of the defect.  The graft was then placed in the primary defect and oriented appropriately.
Purse String (Intermediate) Text: Given the location of the defect and the characteristics of the surrounding skin a purse string intermediate closure was deemed most appropriate.  Undermining was performed circumfirentially around the surgical defect.  A purse string suture was then placed and tightened.
Purse String (Simple) Text: Given the location of the defect and the characteristics of the surrounding skin a purse string simple closure was deemed most appropriate.  Undermining was performed circumferentially around the surgical defect.  A purse string suture was then placed and tightened.
Partial Purse String (Intermediate) Text: Given the location of the defect and the characteristics of the surrounding skin an intermediate purse string closure was deemed most appropriate.  Undermining was performed circumferentially around the surgical defect.  A purse string suture was then placed and tightened. Wound tension of the circular defect prevented complete closure of the wound.
Partial Purse String (Simple) Text: Given the location of the defect and the characteristics of the surrounding skin a simple purse string closure was deemed most appropriate.  Undermining was performed circumferentially around the surgical defect.  A purse string suture was then placed and tightened. Wound tension of the circular defect prevented complete closure of the wound.
Complex Repair And Single Advancement Flap Text: The defect edges were debeveled with a #15 scalpel blade.  The primary defect was closed partially with a complex linear closure.  Given the location of the remaining defect, shape of the defect and the proximity to free margins a single advancement flap was deemed most appropriate for complete closure of the defect.  Using a sterile surgical marker, an appropriate advancement flap was drawn incorporating the defect and placing the expected incisions within the relaxed skin tension lines where possible.    The area thus outlined was incised deep to adipose tissue with a #15 scalpel blade.  The skin margins were undermined to an appropriate distance in all directions utilizing iris scissors.
Complex Repair And Double Advancement Flap Text: The defect edges were debeveled with a #15 scalpel blade.  The primary defect was closed partially with a complex linear closure.  Given the location of the remaining defect, shape of the defect and the proximity to free margins a double advancement flap was deemed most appropriate for complete closure of the defect.  Using a sterile surgical marker, an appropriate advancement flap was drawn incorporating the defect and placing the expected incisions within the relaxed skin tension lines where possible.    The area thus outlined was incised deep to adipose tissue with a #15 scalpel blade.  The skin margins were undermined to an appropriate distance in all directions utilizing iris scissors.
Complex Repair And Modified Advancement Flap Text: The defect edges were debeveled with a #15 scalpel blade.  The primary defect was closed partially with a complex linear closure.  Given the location of the remaining defect, shape of the defect and the proximity to free margins a modified advancement flap was deemed most appropriate for complete closure of the defect.  Using a sterile surgical marker, an appropriate advancement flap was drawn incorporating the defect and placing the expected incisions within the relaxed skin tension lines where possible.    The area thus outlined was incised deep to adipose tissue with a #15 scalpel blade.  The skin margins were undermined to an appropriate distance in all directions utilizing iris scissors.
Complex Repair And A-T Advancement Flap Text: The defect edges were debeveled with a #15 scalpel blade.  The primary defect was closed partially with a complex linear closure.  Given the location of the remaining defect, shape of the defect and the proximity to free margins an A-T advancement flap was deemed most appropriate for complete closure of the defect.  Using a sterile surgical marker, an appropriate advancement flap was drawn incorporating the defect and placing the expected incisions within the relaxed skin tension lines where possible.    The area thus outlined was incised deep to adipose tissue with a #15 scalpel blade.  The skin margins were undermined to an appropriate distance in all directions utilizing iris scissors.
Complex Repair And O-T Advancement Flap Text: The defect edges were debeveled with a #15 scalpel blade.  The primary defect was closed partially with a complex linear closure.  Given the location of the remaining defect, shape of the defect and the proximity to free margins an O-T advancement flap was deemed most appropriate for complete closure of the defect.  Using a sterile surgical marker, an appropriate advancement flap was drawn incorporating the defect and placing the expected incisions within the relaxed skin tension lines where possible.    The area thus outlined was incised deep to adipose tissue with a #15 scalpel blade.  The skin margins were undermined to an appropriate distance in all directions utilizing iris scissors.
Complex Repair And O-L Flap Text: The defect edges were debeveled with a #15 scalpel blade.  The primary defect was closed partially with a complex linear closure.  Given the location of the remaining defect, shape of the defect and the proximity to free margins an O-L flap was deemed most appropriate for complete closure of the defect.  Using a sterile surgical marker, an appropriate flap was drawn incorporating the defect and placing the expected incisions within the relaxed skin tension lines where possible.    The area thus outlined was incised deep to adipose tissue with a #15 scalpel blade.  The skin margins were undermined to an appropriate distance in all directions utilizing iris scissors.
Complex Repair And Bilobe Flap Text: The defect edges were debeveled with a #15 scalpel blade.  The primary defect was closed partially with a complex linear closure.  Given the location of the remaining defect, shape of the defect and the proximity to free margins a bilobe flap was deemed most appropriate for complete closure of the defect.  Using a sterile surgical marker, an appropriate advancement flap was drawn incorporating the defect and placing the expected incisions within the relaxed skin tension lines where possible.    The area thus outlined was incised deep to adipose tissue with a #15 scalpel blade.  The skin margins were undermined to an appropriate distance in all directions utilizing iris scissors.
Complex Repair And Melolabial Flap Text: The defect edges were debeveled with a #15 scalpel blade.  The primary defect was closed partially with a complex linear closure.  Given the location of the remaining defect, shape of the defect and the proximity to free margins a melolabial flap was deemed most appropriate for complete closure of the defect.  Using a sterile surgical marker, an appropriate advancement flap was drawn incorporating the defect and placing the expected incisions within the relaxed skin tension lines where possible.    The area thus outlined was incised deep to adipose tissue with a #15 scalpel blade.  The skin margins were undermined to an appropriate distance in all directions utilizing iris scissors.
Complex Repair And Rotation Flap Text: The defect edges were debeveled with a #15 scalpel blade.  The primary defect was closed partially with a complex linear closure.  Given the location of the remaining defect, shape of the defect and the proximity to free margins a rotation flap was deemed most appropriate for complete closure of the defect.  Using a sterile surgical marker, an appropriate advancement flap was drawn incorporating the defect and placing the expected incisions within the relaxed skin tension lines where possible.    The area thus outlined was incised deep to adipose tissue with a #15 scalpel blade.  The skin margins were undermined to an appropriate distance in all directions utilizing iris scissors.
Complex Repair And Rhombic Flap Text: The defect edges were debeveled with a #15 scalpel blade.  The primary defect was closed partially with a complex linear closure.  Given the location of the remaining defect, shape of the defect and the proximity to free margins a rhombic flap was deemed most appropriate for complete closure of the defect.  Using a sterile surgical marker, an appropriate advancement flap was drawn incorporating the defect and placing the expected incisions within the relaxed skin tension lines where possible.    The area thus outlined was incised deep to adipose tissue with a #15 scalpel blade.  The skin margins were undermined to an appropriate distance in all directions utilizing iris scissors.
Complex Repair And Transposition Flap Text: The defect edges were debeveled with a #15 scalpel blade.  The primary defect was closed partially with a complex linear closure.  Given the location of the remaining defect, shape of the defect and the proximity to free margins a transposition flap was deemed most appropriate for complete closure of the defect.  Using a sterile surgical marker, an appropriate advancement flap was drawn incorporating the defect and placing the expected incisions within the relaxed skin tension lines where possible.    The area thus outlined was incised deep to adipose tissue with a #15 scalpel blade.  The skin margins were undermined to an appropriate distance in all directions utilizing iris scissors.
Complex Repair And V-Y Plasty Text: The defect edges were debeveled with a #15 scalpel blade.  The primary defect was closed partially with a complex linear closure.  Given the location of the remaining defect, shape of the defect and the proximity to free margins a V-Y plasty was deemed most appropriate for complete closure of the defect.  Using a sterile surgical marker, an appropriate advancement flap was drawn incorporating the defect and placing the expected incisions within the relaxed skin tension lines where possible.    The area thus outlined was incised deep to adipose tissue with a #15 scalpel blade.  The skin margins were undermined to an appropriate distance in all directions utilizing iris scissors.
Complex Repair And M Plasty Text: The defect edges were debeveled with a #15 scalpel blade.  The primary defect was closed partially with a complex linear closure.  Given the location of the remaining defect, shape of the defect and the proximity to free margins an M plasty was deemed most appropriate for complete closure of the defect.  Using a sterile surgical marker, an appropriate advancement flap was drawn incorporating the defect and placing the expected incisions within the relaxed skin tension lines where possible.    The area thus outlined was incised deep to adipose tissue with a #15 scalpel blade.  The skin margins were undermined to an appropriate distance in all directions utilizing iris scissors.
Complex Repair And Double M Plasty Text: The defect edges were debeveled with a #15 scalpel blade.  The primary defect was closed partially with a complex linear closure.  Given the location of the remaining defect, shape of the defect and the proximity to free margins a double M plasty was deemed most appropriate for complete closure of the defect.  Using a sterile surgical marker, an appropriate advancement flap was drawn incorporating the defect and placing the expected incisions within the relaxed skin tension lines where possible.    The area thus outlined was incised deep to adipose tissue with a #15 scalpel blade.  The skin margins were undermined to an appropriate distance in all directions utilizing iris scissors.
Complex Repair And W Plasty Text: The defect edges were debeveled with a #15 scalpel blade.  The primary defect was closed partially with a complex linear closure.  Given the location of the remaining defect, shape of the defect and the proximity to free margins a W plasty was deemed most appropriate for complete closure of the defect.  Using a sterile surgical marker, an appropriate advancement flap was drawn incorporating the defect and placing the expected incisions within the relaxed skin tension lines where possible.    The area thus outlined was incised deep to adipose tissue with a #15 scalpel blade.  The skin margins were undermined to an appropriate distance in all directions utilizing iris scissors.
Complex Repair And Z Plasty Text: The defect edges were debeveled with a #15 scalpel blade.  The primary defect was closed partially with a complex linear closure.  Given the location of the remaining defect, shape of the defect and the proximity to free margins a Z plasty was deemed most appropriate for complete closure of the defect.  Using a sterile surgical marker, an appropriate advancement flap was drawn incorporating the defect and placing the expected incisions within the relaxed skin tension lines where possible.    The area thus outlined was incised deep to adipose tissue with a #15 scalpel blade.  The skin margins were undermined to an appropriate distance in all directions utilizing iris scissors.
Complex Repair And Dorsal Nasal Flap Text: The defect edges were debeveled with a #15 scalpel blade.  The primary defect was closed partially with a complex linear closure.  Given the location of the remaining defect, shape of the defect and the proximity to free margins a dorsal nasal flap was deemed most appropriate for complete closure of the defect.  Using a sterile surgical marker, an appropriate flap was drawn incorporating the defect and placing the expected incisions within the relaxed skin tension lines where possible.    The area thus outlined was incised deep to adipose tissue with a #15 scalpel blade.  The skin margins were undermined to an appropriate distance in all directions utilizing iris scissors.
Complex Repair And Ftsg Text: The defect edges were debeveled with a #15 scalpel blade.  The primary defect was closed partially with a complex linear closure.  Given the location of the defect, shape of the defect and the proximity to free margins a full thickness skin graft was deemed most appropriate to repair the remaining defect.  The graft was trimmed to fit the size of the remaining defect.  The graft was then placed in the primary defect, oriented appropriately, and sutured into place.
Complex Repair And Burow's Graft Text: The defect edges were debeveled with a #15 scalpel blade.  The primary defect was closed partially with a complex linear closure.  Given the location of the defect, shape of the defect, the proximity to free margins and the presence of a standing cone deformity a Burow's graft was deemed most appropriate to repair the remaining defect.  The graft was trimmed to fit the size of the remaining defect.  The graft was then placed in the primary defect, oriented appropriately, and sutured into place.
Complex Repair And Split-Thickness Skin Graft Text: The defect edges were debeveled with a #15 scalpel blade.  The primary defect was closed partially with a complex linear closure.  Given the location of the defect, shape of the defect and the proximity to free margins a split thickness skin graft was deemed most appropriate to repair the remaining defect.  The graft was trimmed to fit the size of the remaining defect.  The graft was then placed in the primary defect, oriented appropriately, and sutured into place.
Complex Repair And Epidermal Autograft Text: The defect edges were debeveled with a #15 scalpel blade.  The primary defect was closed partially with a complex linear closure.  Given the location of the defect, shape of the defect and the proximity to free margins an epidermal autograft was deemed most appropriate to repair the remaining defect.  The graft was trimmed to fit the size of the remaining defect.  The graft was then placed in the primary defect, oriented appropriately, and sutured into place.
Complex Repair And Dermal Autograft Text: The defect edges were debeveled with a #15 scalpel blade.  The primary defect was closed partially with a complex linear closure.  Given the location of the defect, shape of the defect and the proximity to free margins an dermal autograft was deemed most appropriate to repair the remaining defect.  The graft was trimmed to fit the size of the remaining defect.  The graft was then placed in the primary defect, oriented appropriately, and sutured into place.
Complex Repair And Tissue Cultured Epidermal Autograft Text: The defect edges were debeveled with a #15 scalpel blade.  The primary defect was closed partially with a complex linear closure.  Given the location of the defect, shape of the defect and the proximity to free margins an tissue cultured epidermal autograft was deemed most appropriate to repair the remaining defect.  The graft was trimmed to fit the size of the remaining defect.  The graft was then placed in the primary defect, oriented appropriately, and sutured into place.
Complex Repair And Xenograft Text: The defect edges were debeveled with a #15 scalpel blade.  The primary defect was closed partially with a complex linear closure.  Given the location of the defect, shape of the defect and the proximity to free margins a xenograft was deemed most appropriate to repair the remaining defect.  The graft was trimmed to fit the size of the remaining defect.  The graft was then placed in the primary defect, oriented appropriately, and sutured into place.
Complex Repair And Skin Substitute Graft Text: The defect edges were debeveled with a #15 scalpel blade.  The primary defect was closed partially with a complex linear closure.  Given the location of the remaining defect, shape of the defect and the proximity to free margins a skin substitute graft was deemed most appropriate to repair the remaining defect.  The graft was trimmed to fit the size of the remaining defect.  The graft was then placed in the primary defect, oriented appropriately, and sutured into place.
Path Notes (To The Dermatopathologist): Please check margins.
Consent was obtained from the patient. The risks and benefits to therapy were discussed in detail. Specifically, the risks of infection, scarring, bleeding, prolonged wound healing, incomplete removal, allergy to anesthesia, nerve injury and recurrence were addressed. Prior to the procedure, the treatment site was clearly identified and confirmed by the patient. All components of Universal Protocol/PAUSE Rule completed.
Post-Care Instructions: I reviewed with the patient in detail post-care instructions. Patient is not to engage in any heavy lifting, exercise, or swimming for the next 14 days. Should the patient develop any fevers, chills, bleeding, severe pain patient will contact the office immediately.
Home Suture Removal Text: Patient was provided a home suture removal kit and will remove their sutures at home.  If they have any questions or difficulties they will call the office.
Where Do You Want The Question To Include Opioid Counseling Located?: Case Summary Tab
Information: Selecting Yes will display possible errors in your note based on the variables you have selected. This validation is only offered as a suggestion for you. PLEASE NOTE THAT THE VALIDATION TEXT WILL BE REMOVED WHEN YOU FINALIZE YOUR NOTE. IF YOU WANT TO FAX A PRELIMINARY NOTE YOU WILL NEED TO TOGGLE THIS TO 'NO' IF YOU DO NOT WANT IT IN YOUR FAXED NOTE.

## 2021-09-08 NOTE — PROCEDURE: INTRALESIONAL KENALOG
Treatment Number (Optional): 2
Validate Note Data When Using Inventory: Yes
Administered By (Optional): Denise Hampton M.D.
Expiration Date For Kenalog (Optional): Dec 2022
Medical Necessity Clause: This procedure was medically necessary because the lesions that were treated were:
X Size Of Lesion In Cm (Optional): 0
Consent: The risks of atrophy were reviewed with the patient.
Kenalog Preparation: Kenalog
Total Volume (Ccs): 0.2
Lot # For Kenalog (Optional): [id number]
Concentration Of Kenalog Solution Injected (Mg/Ml): 40.0
Include Z78.9 (Other Specified Conditions Influencing Health Status) As An Associated Diagnosis?: No
Detail Level: Detailed

## 2021-09-16 ENCOUNTER — APPOINTMENT (RX ONLY)
Dept: URBAN - METROPOLITAN AREA CLINIC 36 | Facility: CLINIC | Age: 81
Setting detail: DERMATOLOGY
End: 2021-09-16

## 2021-09-16 DIAGNOSIS — Z48.02 ENCOUNTER FOR REMOVAL OF SUTURES: ICD-10-CM

## 2021-09-16 PROCEDURE — 99024 POSTOP FOLLOW-UP VISIT: CPT

## 2021-09-16 PROCEDURE — ? SUTURE REMOVAL (GLOBAL PERIOD)

## 2021-09-16 ASSESSMENT — LOCATION DETAILED DESCRIPTION DERM: LOCATION DETAILED: RIGHT DISTAL POSTERIOR UPPER ARM

## 2021-09-16 ASSESSMENT — LOCATION ZONE DERM: LOCATION ZONE: ARM

## 2021-09-16 ASSESSMENT — LOCATION SIMPLE DESCRIPTION DERM: LOCATION SIMPLE: RIGHT POSTERIOR UPPER ARM

## 2021-09-16 NOTE — PROCEDURE: SUTURE REMOVAL (GLOBAL PERIOD)
Detail Level: Detailed
Add 27795 Cpt? (Important Note: In 2017 The Use Of 82202 Is Being Tracked By Cms To Determine Future Global Period Reimbursement For Global Periods): yes

## 2021-09-24 ENCOUNTER — TELEPHONE (OUTPATIENT)
Dept: CARDIOLOGY | Facility: MEDICAL CENTER | Age: 81
End: 2021-09-24

## 2021-10-22 ENCOUNTER — APPOINTMENT (RX ONLY)
Dept: URBAN - METROPOLITAN AREA CLINIC 20 | Facility: CLINIC | Age: 81
Setting detail: DERMATOLOGY
End: 2021-10-22

## 2021-10-22 DIAGNOSIS — Z41.9 ENCOUNTER FOR PROCEDURE FOR PURPOSES OTHER THAN REMEDYING HEALTH STATE, UNSPECIFIED: ICD-10-CM

## 2021-10-22 PROCEDURE — ? SCITON BBL

## 2021-10-22 ASSESSMENT — LOCATION DETAILED DESCRIPTION DERM
LOCATION DETAILED: RIGHT CENTRAL MALAR CHEEK
LOCATION DETAILED: INFERIOR MID FOREHEAD

## 2021-10-22 ASSESSMENT — LOCATION SIMPLE DESCRIPTION DERM
LOCATION SIMPLE: RIGHT CHEEK
LOCATION SIMPLE: INFERIOR FOREHEAD

## 2021-10-22 ASSESSMENT — LOCATION ZONE DERM: LOCATION ZONE: FACE

## 2021-10-22 NOTE — PROCEDURE: SCITON BBL
Hide Repetition Rate?: No
Passes: 1
Pulse Duration: 15
Cooling (In C): 20
Pulse Duration: 10
Cooling ?: Yes
Price (Use Numbers Only, No Special Characters Or $): 326
Pulse Duration Units: milliseconds
Location Override (Will Not Show Above If Text Entered): spot treat
Spot Size: Finesse Adapter Size: 15 x 45 mm (No Finesse Adapter)
Fluence (J/Cm2): 21
Spot Size: Finesse Adapter Size: 7 mm round
Treatment Number: 4
Indication Override (Will Not Show Above If Text Entered): Vascular
Preprocedure Text: The treatment areas were thoroughly cleaned. Any exposed at risk hair that was not to be treated was covered in white paper tape. Clear ultrasound gel was applied to the treatment area. The area was treated with no immediate stacking of pulses.
Cooling (In C): 25
Anesthesia Volume In Cc: 0
Post Procedure Text: The patient tolerated the procedure well. Ice-chilled washclothes were applied to the treatment area for comfort. Post care was reviewed with the patient.
Post-Care Instructions: I reviewed with the patient in detail post-care instructions. Patient should stay away from the sun and wear sun protection until treated areas are fully healed.
Detail Level: Zone
Fluence (J/Cm2): 14
Fluence (J/Cm2): 12
Fluence (J/Cm2): 8
Consent: Written consent obtained, risks reviewed including but not limited to crusting, scabbing, blistering, scarring, darker or lighter pigmentary change, bruising, and/or incomplete response.

## 2021-11-02 NOTE — PROGRESS NOTES
Chief Complaint   Patient presents with   • Hypertension       Subjective     Danica Rapp is a 80 y.o. adult previous patient of Dr. Christo Garrett who presents today for follow up.    Patient was last seen by Dr. Garrett on 6/2/2021. She was c/o increased edema in the afternoon. BP was well controlled. Her amlodipine was reduced to try and help.     Other PMH pertinent for hypokalemia (on PO replacement), mild septal hypertrophy, HTN and GERD.     Today patient states that she has been struggling with positional dizziness. She has been evaluated by her ENT and he states that everything checked out okay, could be some sort of a vestibular abnormality possibly. She is not sure how her BP has been running at home as she has not been regularly checking. Denies chest pain, palpitations, SOB, edema or syncope.       Past Medical History:   Diagnosis Date   • Chickenpox    • Essential hypertension, benign 6/5/2012   • GERD (gastroesophageal reflux disease)    • [place] measles    • Mumps    • Nasal drainage    • Nonspecific abnormal electrocardiogram (ECG) (EKG) 6/5/2012   • Other chest pain 6/5/2012     Past Surgical History:   Procedure Laterality Date   • CHOLECYSTECTOMY     • HYSTERECTOMY LAPAROSCOPY       Family History   Problem Relation Age of Onset   • Lung Disease Mother    • Lung Disease Father    • Cancer Father      Social History     Socioeconomic History   • Marital status:      Spouse name: Not on file   • Number of children: Not on file   • Years of education: Not on file   • Highest education level: Not on file   Occupational History   • Not on file   Tobacco Use   • Smoking status: Never Smoker   • Smokeless tobacco: Never Used   Vaping Use   • Vaping Use: Never used   Substance and Sexual Activity   • Alcohol use: Yes     Alcohol/week: 1.2 - 1.8 oz     Types: 2 Glasses of wine per week     Comment: occ   • Drug use: No   • Sexual activity: Not on file   Other Topics Concern   • Not on file    Social History Narrative   • Not on file     Social Determinants of Health     Financial Resource Strain:    • Difficulty of Paying Living Expenses: Not on file   Food Insecurity:    • Worried About Running Out of Food in the Last Year: Not on file   • Ran Out of Food in the Last Year: Not on file   Transportation Needs:    • Lack of Transportation (Medical): Not on file   • Lack of Transportation (Non-Medical): Not on file   Physical Activity:    • Days of Exercise per Week: Not on file   • Minutes of Exercise per Session: Not on file   Stress:    • Feeling of Stress : Not on file   Social Connections:    • Frequency of Communication with Friends and Family: Not on file   • Frequency of Social Gatherings with Friends and Family: Not on file   • Attends Tenriism Services: Not on file   • Active Member of Clubs or Organizations: Not on file   • Attends Club or Organization Meetings: Not on file   • Marital Status: Not on file   Intimate Partner Violence:    • Fear of Current or Ex-Partner: Not on file   • Emotionally Abused: Not on file   • Physically Abused: Not on file   • Sexually Abused: Not on file   Housing Stability:    • Unable to Pay for Housing in the Last Year: Not on file   • Number of Places Lived in the Last Year: Not on file   • Unstable Housing in the Last Year: Not on file     Allergies   Allergen Reactions   • Macrodantin [Nitrofurantoin Macrocrystal] Itching and Swelling   • Amlodipine Swelling   • Augmentin Itching     Headaches   • Z-Pako [Azithromycin] Itching and Nausea     Outpatient Encounter Medications as of 11/9/2021   Medication Sig Dispense Refill   • valsartan-hydrochlorothiazide (DIOVAN-HCT) 320-12.5 MG per tablet Take 1 Tablet by mouth every day. 90 Tablet 3   • amLODIPine (NORVASC) 2.5 MG Tab Take 1 tablet by mouth every day. 30 tablet 9   • estradiol (CLIMARA) 0.1 MG/24HR PATCH WEEKLY      • carvedilol (COREG) 25 MG Tab Take 1 Tab by mouth 2 times a day, with meals. 180 Tab 3   •  "potassium chloride (KLOR-CON) 8 MEQ tablet TAKE 1 TABLET BY MOUTH EVERY DAY (Patient taking differently: Take 24 mEq by mouth every day.) 90 Tab 0   • Cranberry (THERACRAN PO) Take  by mouth.     • vitamin D (CHOLECALCIFEROL) 1000 UNIT Tab Take 1,000 Units by mouth every day. 2000mg     • Probiotic Product (PROBIOTIC DAILY PO) Take 1 Each by mouth every day.     • Calcium Carbonate-Vitamin D (CALCIUM + D PO) Take 500 mg by mouth every day.     • Multiple Vitamin (MULTI-VITAMIN DAILY PO) Take  by mouth every day.     • omeprazole (PRILOSEC) 20 MG CPDR Take 20 mg by mouth every day.       • [DISCONTINUED] amLODIPine (NORVASC) 5 MG Tab Take 1 tablet by mouth every day. (Patient not taking: Reported on 11/9/2021) 90 tablet 3   • [DISCONTINUED] ciprofloxacin (CIPRO) 500 MG Tab Take  by mouth. Take 1 tablet by mouth two times a day (Patient not taking: Reported on 11/9/2021)     • [DISCONTINUED] phenazopyridine (PYRIDIUM) 100 MG Tab TK 1 TO 2 TS PO TID PRN (Patient not taking: Reported on 11/9/2021)     • [DISCONTINUED] sulfamethoxazole-trimethoprim (BACTRIM DS) 800-160 MG tablet TK 1 T PO  BID X 7 DAYS PER MD (Patient not taking: Reported on 11/9/2021)       No facility-administered encounter medications on file as of 11/9/2021.     ROS           Objective     /62 (BP Location: Right arm, Patient Position: Standing, BP Cuff Size: Adult)   Pulse 72   Resp 19   Ht 1.575 m (5' 2\")   Wt 68.9 kg (152 lb)   SpO2 95%   BMI 27.80 kg/m²     Physical Exam       TTE 9/12/2019:  CONCLUSIONS  No prior study is available for comparison.   Mild asymmetric septalhypertrophy.  Mild asymmetric septal hypertrophy.  Normal left ventricular systolic function.  Left ventricular ejection fraction is visually estimated to be 70%.  Normal right ventricular size and systolic function.  Normal left atrial size.  Mild MAC. No mitral stenosis.  Trace mitral regurgitation.  Structurally normal aortic valve without significant stenosis or " regurgitation.  Normal pericardium without effusion.  Normal aortic root for body surface area.    Assessment & Plan     1. Dizziness  Referral to Physical Therapy   2. Essential hypertension     3. Hypokalemia         Medical Decision Making: Today's Assessment/Status/Plan:     BP stable in office today. Encouraged patient to obtain a BP cuff and start spot checking it periodically or when she is not feeling well.    Discussed possible medication causes for her positional dizziness, particularly the HCTZ. Orthostatic BP was checked in office today and was normal. Will refer patient to PT for vestibular therapy, she will call if this dose not help and we can look at possibly stopping her HCTZ and keep just the valsartan and see if that helps her at all.     Patient states that she just had a full blood panel drawn by her PCP, will request a copy of those results.     Patient would like to establish care with Dr. Douglas due to Dr. Garrett's prison. Apt arranged as below. Encouraged patient to contact our office should any questions or concerns arise in the mean time.     Future Appointments   Date Time Provider Department Center   1/14/2022 12:45 PM Fidencio Douglas M.D. [facility] None

## 2021-11-09 ENCOUNTER — OFFICE VISIT (OUTPATIENT)
Dept: CARDIOLOGY | Facility: MEDICAL CENTER | Age: 81
End: 2021-11-09
Payer: MEDICARE

## 2021-11-09 VITALS
BODY MASS INDEX: 27.97 KG/M2 | HEIGHT: 62 IN | SYSTOLIC BLOOD PRESSURE: 130 MMHG | HEART RATE: 72 BPM | WEIGHT: 152 LBS | RESPIRATION RATE: 19 BRPM | OXYGEN SATURATION: 95 % | DIASTOLIC BLOOD PRESSURE: 62 MMHG

## 2021-11-09 DIAGNOSIS — I10 ESSENTIAL HYPERTENSION: ICD-10-CM

## 2021-11-09 DIAGNOSIS — E87.6 HYPOKALEMIA: ICD-10-CM

## 2021-11-09 DIAGNOSIS — R42 DIZZINESS: ICD-10-CM

## 2021-11-09 PROCEDURE — 99214 OFFICE O/P EST MOD 30 MIN: CPT | Performed by: NURSE PRACTITIONER

## 2021-11-09 ASSESSMENT — FIBROSIS 4 INDEX: FIB4 SCORE: 0.86

## 2021-11-11 ENCOUNTER — APPOINTMENT (RX ONLY)
Dept: URBAN - METROPOLITAN AREA CLINIC 20 | Facility: CLINIC | Age: 81
Setting detail: DERMATOLOGY
End: 2021-11-11

## 2022-01-26 ENCOUNTER — APPOINTMENT (RX ONLY)
Dept: URBAN - METROPOLITAN AREA CLINIC 4 | Facility: CLINIC | Age: 82
Setting detail: DERMATOLOGY
End: 2022-01-26

## 2022-01-26 DIAGNOSIS — L57.0 ACTINIC KERATOSIS: ICD-10-CM

## 2022-01-26 DIAGNOSIS — Z85.828 PERSONAL HISTORY OF OTHER MALIGNANT NEOPLASM OF SKIN: ICD-10-CM

## 2022-01-26 DIAGNOSIS — L81.4 OTHER MELANIN HYPERPIGMENTATION: ICD-10-CM | Status: STABLE

## 2022-01-26 DIAGNOSIS — L82.1 OTHER SEBORRHEIC KERATOSIS: ICD-10-CM

## 2022-01-26 DIAGNOSIS — H61.03 CHONDRITIS OF EXTERNAL EAR: ICD-10-CM | Status: INADEQUATELY CONTROLLED

## 2022-01-26 DIAGNOSIS — L82.0 INFLAMED SEBORRHEIC KERATOSIS: ICD-10-CM

## 2022-01-26 DIAGNOSIS — D22 MELANOCYTIC NEVI: ICD-10-CM

## 2022-01-26 DIAGNOSIS — D18.0 HEMANGIOMA: ICD-10-CM

## 2022-01-26 PROBLEM — D22.5 MELANOCYTIC NEVI OF TRUNK: Status: ACTIVE | Noted: 2022-01-26

## 2022-01-26 PROBLEM — D18.01 HEMANGIOMA OF SKIN AND SUBCUTANEOUS TISSUE: Status: ACTIVE | Noted: 2022-01-26

## 2022-01-26 PROBLEM — H61.039 CHONDRITIS OF EXTERNAL EAR, UNSPECIFIED EAR: Status: ACTIVE | Noted: 2022-01-26

## 2022-01-26 PROCEDURE — 17111 DESTRUCTION B9 LESIONS 15/>: CPT

## 2022-01-26 PROCEDURE — ? OBSERVATION AND MEASURE

## 2022-01-26 PROCEDURE — ? ADDITIONAL NOTES

## 2022-01-26 PROCEDURE — 17000 DESTRUCT PREMALG LESION: CPT | Mod: 59

## 2022-01-26 PROCEDURE — 17003 DESTRUCT PREMALG LES 2-14: CPT | Mod: 59

## 2022-01-26 PROCEDURE — ? LIQUID NITROGEN

## 2022-01-26 PROCEDURE — 99213 OFFICE O/P EST LOW 20 MIN: CPT | Mod: 25

## 2022-01-26 PROCEDURE — ? COUNSELING

## 2022-01-26 PROCEDURE — ? DIAGNOSIS COMMENT

## 2022-01-26 ASSESSMENT — LOCATION DETAILED DESCRIPTION DERM
LOCATION DETAILED: LEFT MEDIAL BREAST 8-9:00 REGION
LOCATION DETAILED: XIPHOID
LOCATION DETAILED: RIGHT RADIAL DORSAL HAND
LOCATION DETAILED: RIGHT MEDIAL BREAST 5-6:00 REGION
LOCATION DETAILED: RIGHT SUPERIOR MEDIAL UPPER BACK
LOCATION DETAILED: INFERIOR THORACIC SPINE
LOCATION DETAILED: RIGHT PROXIMAL PRETIBIAL REGION
LOCATION DETAILED: LEFT DISTAL DORSAL FOREARM
LOCATION DETAILED: LEFT ANTERIOR SHOULDER
LOCATION DETAILED: RIGHT ULNAR DORSAL HAND
LOCATION DETAILED: LEFT INFERIOR LATERAL NECK
LOCATION DETAILED: SUBXIPHOID
LOCATION DETAILED: RIGHT RIB CAGE
LOCATION DETAILED: LEFT SUPERIOR HELIX
LOCATION DETAILED: LEFT INFRAMAMMARY CREASE (INNER QUADRANT)
LOCATION DETAILED: RIGHT DISTAL DORSAL FOREARM
LOCATION DETAILED: RIGHT DISTAL POSTERIOR UPPER ARM

## 2022-01-26 ASSESSMENT — LOCATION ZONE DERM
LOCATION ZONE: NECK
LOCATION ZONE: HAND
LOCATION ZONE: ARM
LOCATION ZONE: TRUNK
LOCATION ZONE: LEG
LOCATION ZONE: EAR

## 2022-01-26 ASSESSMENT — LOCATION SIMPLE DESCRIPTION DERM
LOCATION SIMPLE: RIGHT PRETIBIAL REGION
LOCATION SIMPLE: RIGHT POSTERIOR UPPER ARM
LOCATION SIMPLE: RIGHT UPPER BACK
LOCATION SIMPLE: UPPER BACK
LOCATION SIMPLE: LEFT FOREARM
LOCATION SIMPLE: LEFT EAR
LOCATION SIMPLE: RIGHT HAND
LOCATION SIMPLE: LEFT BREAST
LOCATION SIMPLE: ABDOMEN
LOCATION SIMPLE: RIGHT BREAST
LOCATION SIMPLE: LEFT ANTERIOR NECK
LOCATION SIMPLE: RIGHT FOREARM
LOCATION SIMPLE: LEFT SHOULDER

## 2022-01-26 NOTE — PROCEDURE: LIQUID NITROGEN
Consent: The patient's consent was obtained including but not limited to risks of crusting, scabbing, blistering, scarring, darker or lighter pigmentary change, recurrence, incomplete removal and infection.
Spray Paint Technique: No
Spray Paint Text: The liquid nitrogen was applied to the skin utilizing a spray paint frosting technique.
Include Z78.9 (Other Specified Conditions Influencing Health Status) As An Associated Diagnosis?: Yes
Medical Necessity Clause: This procedure was medically necessary because the lesions that were treated were:
Detail Level: Detailed
Post-Care Instructions: I reviewed with the patient in detail post-care instructions. Patient is to wear sunprotection, and avoid picking at any of the treated lesions. Pt may apply Vaseline to crusted or scabbing areas.
Medical Necessity Information: It is in your best interest to select a reason for this procedure from the list below. All of these items fulfill various CMS LCD requirements except the new and changing color options.
Duration Of Freeze Thaw-Cycle (Seconds): 0

## 2022-01-26 NOTE — PROCEDURE: ADDITIONAL NOTES
Detail Level: Detailed
Additional Notes: The patient may refer to Dr. Hampton for surgical removal if she desires.\\nShe prefers to not schedule at this time.
Render Risk Assessment In Note?: no

## 2022-01-26 NOTE — PROCEDURE: DIAGNOSIS COMMENT
Detail Level: Detailed
Comment: Slightly atypical\\n\\nSee photos 11/4/2020
Comment: [id number] A, hx of SCC well differentiated. excised by Dr. Hampton
Render Risk Assessment In Note?: no
Comment: Mohs scar, hx of BCC Nodular type. Treated by Dr. Hampton. [id number]

## 2022-02-11 DIAGNOSIS — I10 ESSENTIAL HYPERTENSION: ICD-10-CM

## 2022-02-15 RX ORDER — AMLODIPINE BESYLATE 2.5 MG/1
2.5 TABLET ORAL DAILY
Qty: 90 TABLET | Refills: 2 | Status: SHIPPED | OUTPATIENT
Start: 2022-02-15 | End: 2022-03-07

## 2022-03-04 ENCOUNTER — HOSPITAL ENCOUNTER (OUTPATIENT)
Dept: LAB | Facility: MEDICAL CENTER | Age: 82
End: 2022-03-04
Attending: PHYSICIAN ASSISTANT
Payer: MEDICARE

## 2022-03-04 LAB
25(OH)D3 SERPL-MCNC: 46 NG/ML (ref 30–100)
ALBUMIN SERPL BCP-MCNC: 3.9 G/DL (ref 3.2–4.9)
ALBUMIN/GLOB SERPL: 1.2 G/DL
ALP SERPL-CCNC: 85 U/L (ref 30–99)
ALT SERPL-CCNC: 18 U/L (ref 2–50)
ANION GAP SERPL CALC-SCNC: 8 MMOL/L (ref 7–16)
AST SERPL-CCNC: 15 U/L (ref 12–45)
BASOPHILS # BLD AUTO: 1 % (ref 0–1.8)
BASOPHILS # BLD: 0.05 K/UL (ref 0–0.12)
BILIRUB SERPL-MCNC: 0.3 MG/DL (ref 0.1–1.5)
BUN SERPL-MCNC: 16 MG/DL (ref 8–22)
CALCIUM SERPL-MCNC: 9.3 MG/DL (ref 8.5–10.5)
CHLORIDE SERPL-SCNC: 97 MMOL/L (ref 96–112)
CHOLEST SERPL-MCNC: 205 MG/DL (ref 100–199)
CO2 SERPL-SCNC: 26 MMOL/L (ref 20–33)
CREAT SERPL-MCNC: 0.87 MG/DL (ref 0.5–1.4)
EOSINOPHIL # BLD AUTO: 0.14 K/UL (ref 0–0.51)
EOSINOPHIL NFR BLD: 2.8 % (ref 0–6.9)
ERYTHROCYTE [DISTWIDTH] IN BLOOD BY AUTOMATED COUNT: 52.4 FL (ref 35.9–50)
FASTING STATUS PATIENT QL REPORTED: NORMAL
GLOBULIN SER CALC-MCNC: 3.3 G/DL (ref 1.9–3.5)
GLUCOSE SERPL-MCNC: 84 MG/DL (ref 65–99)
HCT VFR BLD AUTO: 39.9 % (ref 37–47)
HDLC SERPL-MCNC: 59 MG/DL
HGB BLD-MCNC: 13 G/DL (ref 12–16)
IMM GRANULOCYTES # BLD AUTO: 0.02 K/UL (ref 0–0.11)
IMM GRANULOCYTES NFR BLD AUTO: 0.4 % (ref 0–0.9)
LDLC SERPL CALC-MCNC: 124 MG/DL
LYMPHOCYTES # BLD AUTO: 1.59 K/UL (ref 1–4.8)
LYMPHOCYTES NFR BLD: 31.4 % (ref 22–41)
MCH RBC QN AUTO: 28.2 PG (ref 27–33)
MCHC RBC AUTO-ENTMCNC: 32.6 G/DL (ref 33.6–35)
MCV RBC AUTO: 86.6 FL (ref 81.4–97.8)
MONOCYTES # BLD AUTO: 0.55 K/UL (ref 0–0.85)
MONOCYTES NFR BLD AUTO: 10.8 % (ref 0–13.4)
NEUTROPHILS # BLD AUTO: 2.72 K/UL (ref 2–7.15)
NEUTROPHILS NFR BLD: 53.6 % (ref 44–72)
NRBC # BLD AUTO: 0 K/UL
NRBC BLD-RTO: 0 /100 WBC
PLATELET # BLD AUTO: 409 K/UL (ref 164–446)
PMV BLD AUTO: 10.6 FL (ref 9–12.9)
POTASSIUM SERPL-SCNC: 4.4 MMOL/L (ref 3.6–5.5)
PROT SERPL-MCNC: 7.2 G/DL (ref 6–8.2)
RBC # BLD AUTO: 4.61 M/UL (ref 4.2–5.4)
SODIUM SERPL-SCNC: 131 MMOL/L (ref 135–145)
TRIGL SERPL-MCNC: 108 MG/DL (ref 0–149)
TSH SERPL DL<=0.005 MIU/L-ACNC: 1.65 UIU/ML (ref 0.38–5.33)
WBC # BLD AUTO: 5.1 K/UL (ref 4.8–10.8)

## 2022-03-04 PROCEDURE — 84443 ASSAY THYROID STIM HORMONE: CPT

## 2022-03-04 PROCEDURE — 80061 LIPID PANEL: CPT

## 2022-03-04 PROCEDURE — 36415 COLL VENOUS BLD VENIPUNCTURE: CPT

## 2022-03-04 PROCEDURE — 80053 COMPREHEN METABOLIC PANEL: CPT

## 2022-03-04 PROCEDURE — 85025 COMPLETE CBC W/AUTO DIFF WBC: CPT

## 2022-03-04 PROCEDURE — 82306 VITAMIN D 25 HYDROXY: CPT | Mod: GA

## 2022-03-07 ENCOUNTER — OFFICE VISIT (OUTPATIENT)
Dept: CARDIOLOGY | Facility: MEDICAL CENTER | Age: 82
End: 2022-03-07
Payer: MEDICARE

## 2022-03-07 VITALS
HEART RATE: 70 BPM | SYSTOLIC BLOOD PRESSURE: 120 MMHG | DIASTOLIC BLOOD PRESSURE: 60 MMHG | HEIGHT: 62 IN | OXYGEN SATURATION: 97 % | RESPIRATION RATE: 14 BRPM | WEIGHT: 153 LBS | BODY MASS INDEX: 28.16 KG/M2

## 2022-03-07 DIAGNOSIS — R42 DIZZINESS: ICD-10-CM

## 2022-03-07 DIAGNOSIS — I42.2 ASYMMETRIC SEPTAL HYPERTROPHY (HCC): ICD-10-CM

## 2022-03-07 DIAGNOSIS — I10 ESSENTIAL HYPERTENSION: ICD-10-CM

## 2022-03-07 LAB — EKG IMPRESSION: NORMAL

## 2022-03-07 PROCEDURE — 93000 ELECTROCARDIOGRAM COMPLETE: CPT | Performed by: INTERNAL MEDICINE

## 2022-03-07 PROCEDURE — 99214 OFFICE O/P EST MOD 30 MIN: CPT | Mod: 25 | Performed by: INTERNAL MEDICINE

## 2022-03-07 RX ORDER — ESTRADIOL 0.1 MG/D
FILM, EXTENDED RELEASE TRANSDERMAL
COMMUNITY
Start: 2022-01-17 | End: 2022-03-07

## 2022-03-07 RX ORDER — PHENAZOPYRIDINE HYDROCHLORIDE 200 MG/1
TABLET, FILM COATED ORAL
COMMUNITY
Start: 2022-01-11 | End: 2022-03-07

## 2022-03-07 RX ORDER — POTASSIUM CHLORIDE 600 MG/1
24 TABLET, FILM COATED, EXTENDED RELEASE ORAL DAILY
COMMUNITY
Start: 2021-12-27 | End: 2022-03-07

## 2022-03-07 RX ORDER — HYDROXYZINE HYDROCHLORIDE 10 MG/1
TABLET, FILM COATED ORAL
COMMUNITY
Start: 2022-03-03 | End: 2022-03-07

## 2022-03-07 ASSESSMENT — FIBROSIS 4 INDEX: FIB4 SCORE: 0.7

## 2022-03-07 NOTE — PROGRESS NOTES
Cardiology Follow-up Consultation Note    Date of note:    3/7/2022    Primary Care Provider: Jazmyne Maddox P.A.-C.    Name:             Danica Rapp   YOB: 1940  MRN:               [id number]    Chief Complaint   Patient presents with   • Hypertension     F/V Dx: Essential hypertension, benign   • Chest Pain     F/V Dx: Other chest pain   • Dizziness       HISTORY OF PRESENT ILLNESS  Ms. Danica Rapp is a 81 y.o. female who returns to see us for follow-up of HTN.    Last clinic visit: 11/9/2021 with Flori LAZAR.  Previous patient of Dr. Garrett.  Patient is new to me.    Interim History:  Patient does report ongoing episodes of dizziness and lightheadedness worse with position change.  Has been very careful in getting up but symptoms have been bothersome to her.    Saw ENT for possible vertigo with unremarkable work-up.    Is currently on amlodipine, valsartan/hydrochlorothiazide and carvedilol.  Reports average BP 120s/60s.      Past Medical History:   Diagnosis Date   • Chickenpox    • Essential hypertension, benign 6/5/2012   • GERD (gastroesophageal reflux disease)    • [place] measles    • Mumps    • Nasal drainage    • Nonspecific abnormal electrocardiogram (ECG) (EKG) 6/5/2012   • Other chest pain 6/5/2012         Past Surgical History:   Procedure Laterality Date   • CHOLECYSTECTOMY     • HYSTERECTOMY LAPAROSCOPY           Current Outpatient Medications   Medication Sig Dispense Refill   • carvedilol (COREG) 25 MG Tab TAKE 1 TABLET BY MOUTH TWICE DAILY WITH MEALS 180 Tablet 3   • valsartan-hydrochlorothiazide (DIOVAN-HCT) 320-12.5 MG per tablet Take 1 Tablet by mouth every day. 90 Tablet 3   • estradiol (CLIMARA) 0.1 MG/24HR PATCH WEEKLY      • potassium chloride (KLOR-CON) 8 MEQ tablet TAKE 1 TABLET BY MOUTH EVERY DAY (Patient taking differently: Take 24 mEq by mouth every day.) 90 Tab 0   • Cranberry (THERACRAN PO) Take  by mouth.     • vitamin D (CHOLECALCIFEROL)  "1000 UNIT Tab Take 1,000 Units by mouth every day. 2000mg     • Probiotic Product (PROBIOTIC DAILY PO) Take 1 Each by mouth every day.     • Calcium Carbonate-Vitamin D (CALCIUM + D PO) Take 500 mg by mouth every day.     • Multiple Vitamin (MULTI-VITAMIN DAILY PO) Take  by mouth every day.     • omeprazole (PRILOSEC) 20 MG CPDR Take 20 mg by mouth every day.         No current facility-administered medications for this visit.         Allergies   Allergen Reactions   • Macrodantin [Nitrofurantoin Macrocrystal] Itching and Swelling   • Amlodipine Swelling   • Augmentin Itching     Headaches   • Z-Pako [Azithromycin] Itching and Nausea         Family History   Problem Relation Age of Onset   • Lung Disease Mother    • Lung Disease Father    • Cancer Father          Social History     Socioeconomic History   • Marital status:      Spouse name: Not on file   • Number of children: Not on file   • Years of education: Not on file   • Highest education level: Not on file   Occupational History   • Not on file   Tobacco Use   • Smoking status: Never Smoker   • Smokeless tobacco: Never Used   Vaping Use   • Vaping Use: Never used   Substance and Sexual Activity   • Alcohol use: Yes     Alcohol/week: 1.2 - 1.8 oz     Types: 2 Glasses of wine per week     Comment: occ   • Drug use: No   • Sexual activity: Not on file   Other Topics Concern   • Not on file   Social History Narrative   • Not on file     Social Determinants of Health     Financial Resource Strain: Not on file   Food Insecurity: Not on file   Transportation Needs: Not on file   Physical Activity: Not on file   Stress: Not on file   Social Connections: Not on file   Intimate Partner Violence: Not on file   Housing Stability: Not on file         Physical Exam:  Ambulatory Vitals  /60 (BP Location: Left arm, Patient Position: Sitting, BP Cuff Size: Adult)   Pulse 70   Resp 14   Ht 1.575 m (5' 2\")   Wt 69.4 kg (153 lb)   SpO2 97%    Oxygen Therapy:  " Pulse Oximetry: 97 %  BP Readings from Last 4 Encounters:   03/07/22 120/60   11/09/21 130/62   06/02/21 120/58   02/12/21 148/82       Weight/BMI: Body mass index is 27.98 kg/m².  Wt Readings from Last 4 Encounters:   03/07/22 69.4 kg (153 lb)   11/09/21 68.9 kg (152 lb)   06/02/21 68.5 kg (151 lb)   02/12/21 70.3 kg (155 lb)       GEN: Well developed, well nourished and in no acute distress.  HEART: no significant JVD, regular rate and rhythm, normal S1 and S2, no murmurs, no third heart sounds, normal cardiac palpation  LUNG: clear to auscultation bilaterally, no wheezing, no crackles, normal respiratory effort on room air  ABDOMEN: soft, non-tender, non-distended, normal bowel sounds throughout  EXTREMITIES: no peripheral edema noted  VASCULAR: no significantly elevated jugular venous pressure, no carotid bruits noted, radial pulses 2+ and equal      Lab Data Review:  Lab Results   Component Value Date/Time    CHOLSTRLTOT 205 (H) 03/04/2022 10:43 AM     (H) 03/04/2022 10:43 AM    HDL 59 03/04/2022 10:43 AM    TRIGLYCERIDE 108 03/04/2022 10:43 AM       Lab Results   Component Value Date/Time    SODIUM 131 (L) 03/04/2022 10:43 AM    POTASSIUM 4.4 03/04/2022 10:43 AM    CHLORIDE 97 03/04/2022 10:43 AM    CO2 26 03/04/2022 10:43 AM    GLUCOSE 84 03/04/2022 10:43 AM    BUN 16 03/04/2022 10:43 AM    CREATININE 0.87 03/04/2022 10:43 AM    BUNCREATRAT 23 07/27/2015 11:37 AM     Lab Results   Component Value Date/Time    ALKPHOSPHAT 85 03/04/2022 10:43 AM    ASTSGOT 15 03/04/2022 10:43 AM    ALTSGPT 18 03/04/2022 10:43 AM    TBILIRUBIN 0.3 03/04/2022 10:43 AM      Lab Results   Component Value Date/Time    WBC 5.1 03/04/2022 10:43 AM       Cardiac Imaging and Procedures Review:    EKG dated 3/7/2022: My personal interpretation is sinus rhythm    Echo dated 9/12/2019:   CONCLUSIONS  No prior study is available for comparison.  Mild asymmetric septal hypertrophy.  Normal left ventricular systolic function.  Left  ventricular ejection fraction is visually estimated to be 70%.  Normal right ventricular size and systolic function.  Normal left atrial size.  Mild MAC. No mitral stenosis.  Trace mitral regurgitation.  Structurally normal aortic valve without significant stenosis or   regurgitation.  Normal pericardium without effusion.  Normal aortic root for body surface area.      Assessment & Plan     1. Essential hypertension  EKG    EC-ECHOCARDIOGRAM COMPLETE W/O CONT   2. Asymmetric septal hypertrophy (HCC)  EC-ECHOCARDIOGRAM COMPLETE W/O CONT   3. Dizziness         Patient reports symptoms that are concerning for symptomatic hypotension.  Is currently on 4 different anti-hypertensives with amlodipine, coreg, valsartan-HCTZ.  Would prefer to stay on HCTZ for diuretic property.  After discussion, patient will discontinue amlodipine 2.5 mg and monitor BP.  Discussed goal BP <140/80 which patient will monitor and let us know if it is above goal.    Obtain transthoracic echocardiogram given asymmetric septal hypertrophy and ongoing symptoms of dizziness.  Rule out obstructive cardiomyopathy.      All of patient's excellent questions were answered to the best of my knowledge and to her satisfaction.  It was a pleasure seeing Ms. Danica Rapp in my clinic today. Return in about 3 months (around 6/7/2022). Patient is aware to call the cardiology clinic with any questions or concerns.      Fidencio Douglas MD  [facility]  [place] [facility], [facility]  [address] [address]  Phone: [phone]  Fax: [phone]    Please note that this dictation was created using voice recognition software. I have made every reasonable attempt to correct obvious errors, but it is possible there are errors of grammar and possibly content that I did not discover before finalizing the note.

## 2022-05-20 DIAGNOSIS — I10 ESSENTIAL HYPERTENSION, BENIGN: ICD-10-CM

## 2022-05-20 RX ORDER — VALSARTAN AND HYDROCHLOROTHIAZIDE 320; 12.5 MG/1; MG/1
1 TABLET, FILM COATED ORAL DAILY
Qty: 90 TABLET | Refills: 3 | Status: SHIPPED | OUTPATIENT
Start: 2022-05-20 | End: 2023-03-14

## 2022-05-26 ENCOUNTER — HOSPITAL ENCOUNTER (OUTPATIENT)
Dept: CARDIOLOGY | Facility: MEDICAL CENTER | Age: 82
End: 2022-05-26
Attending: INTERNAL MEDICINE
Payer: MEDICARE

## 2022-05-26 DIAGNOSIS — I10 ESSENTIAL HYPERTENSION: ICD-10-CM

## 2022-05-26 DIAGNOSIS — I42.2 ASYMMETRIC SEPTAL HYPERTROPHY (HCC): ICD-10-CM

## 2022-05-26 PROCEDURE — 93306 TTE W/DOPPLER COMPLETE: CPT

## 2022-05-30 LAB
LV EJECT FRACT  99904: 65
LV EJECT FRACT MOD 2C 99903: 66.9
LV EJECT FRACT MOD 4C 99902: 60.34
LV EJECT FRACT MOD BP 99901: 63.45

## 2022-05-30 PROCEDURE — 93306 TTE W/DOPPLER COMPLETE: CPT | Mod: 26 | Performed by: INTERNAL MEDICINE

## 2022-08-20 DIAGNOSIS — I10 ESSENTIAL HYPERTENSION: ICD-10-CM

## 2022-08-22 RX ORDER — CARVEDILOL 25 MG/1
TABLET ORAL
Qty: 180 TABLET | Refills: 0 | Status: SHIPPED | OUTPATIENT
Start: 2022-08-22 | End: 2022-11-22 | Stop reason: SDUPTHER

## 2022-08-22 NOTE — TELEPHONE ENCOUNTER
Is the patient due for a refill? No  Done on 11/12/21 # 180 3 RF     Was the patient seen the past year? Yes    Date of last office visit: 3/7/22    Does the patient have an upcoming appointment?  Yes   If yes, When? 10/7/22    Provider to refill:DA    Does the patients insurance require a 100 day supply?  No

## 2022-09-19 ENCOUNTER — APPOINTMENT (OUTPATIENT)
Dept: CARDIOLOGY | Facility: MEDICAL CENTER | Age: 82
End: 2022-09-19
Payer: MEDICARE

## 2022-10-07 ENCOUNTER — APPOINTMENT (OUTPATIENT)
Dept: CARDIOLOGY | Facility: MEDICAL CENTER | Age: 82
End: 2022-10-07
Payer: MEDICARE

## 2022-10-07 ENCOUNTER — OFFICE VISIT (OUTPATIENT)
Dept: CARDIOLOGY | Facility: MEDICAL CENTER | Age: 82
End: 2022-10-07
Payer: MEDICARE

## 2022-10-07 VITALS
OXYGEN SATURATION: 96 % | RESPIRATION RATE: 18 BRPM | DIASTOLIC BLOOD PRESSURE: 60 MMHG | WEIGHT: 153 LBS | HEART RATE: 74 BPM | BODY MASS INDEX: 28.16 KG/M2 | HEIGHT: 62 IN | SYSTOLIC BLOOD PRESSURE: 134 MMHG

## 2022-10-07 DIAGNOSIS — E87.6 HYPOKALEMIA: ICD-10-CM

## 2022-10-07 DIAGNOSIS — I10 ESSENTIAL HYPERTENSION: ICD-10-CM

## 2022-10-07 PROCEDURE — 99214 OFFICE O/P EST MOD 30 MIN: CPT | Performed by: INTERNAL MEDICINE

## 2022-10-07 RX ORDER — ESTRADIOL 0.1 MG/D
FILM, EXTENDED RELEASE TRANSDERMAL
COMMUNITY
End: 2022-10-07

## 2022-10-07 RX ORDER — PHENAZOPYRIDINE HYDROCHLORIDE 100 MG/1
TABLET, FILM COATED ORAL
COMMUNITY
Start: 2022-08-02 | End: 2022-10-07

## 2022-10-07 RX ORDER — PHENAZOPYRIDINE HYDROCHLORIDE 100 MG/1
TABLET, FILM COATED ORAL
COMMUNITY
End: 2022-10-07

## 2022-10-07 RX ORDER — CIPROFLOXACIN 500 MG/1
TABLET, FILM COATED ORAL
COMMUNITY
Start: 2022-07-27 | End: 2022-10-07

## 2022-10-07 RX ORDER — SULFAMETHOXAZOLE AND TRIMETHOPRIM 800; 160 MG/1; MG/1
TABLET ORAL
COMMUNITY
End: 2022-10-07

## 2022-10-07 RX ORDER — ESTRADIOL 0.1 MG/D
FILM, EXTENDED RELEASE TRANSDERMAL
COMMUNITY
Start: 2022-09-28

## 2022-10-07 RX ORDER — HYDROXYZINE HYDROCHLORIDE 10 MG/1
TABLET, FILM COATED ORAL
COMMUNITY
End: 2023-11-01

## 2022-10-07 RX ORDER — CEPHALEXIN 500 MG/1
CAPSULE ORAL
COMMUNITY
Start: 2022-08-03 | End: 2022-10-07

## 2022-10-07 ASSESSMENT — FIBROSIS 4 INDEX: FIB4 SCORE: 0.7

## 2022-10-07 NOTE — PROGRESS NOTES
Cardiology Follow-up Consultation Note    Date of note:    10/7/2022    Primary Care Provider: Jazmyne Maddox P.A.-C.    Name:             Danica Rapp   YOB: 1940  MRN:               [id number]    Chief Complaint   Patient presents with    HTN (Controlled)       HISTORY OF PRESENT ILLNESS  Ms. Danica Rapp is a 81 y.o. female who returns to see us for follow-up of HTN.    Last clinic visit: 3/7/2022    Interim History:  Since her last visit, has been doing well from a cardiovascular perspective.  No further episodes of dizziness, lightheadedness after discontinuation of amlodipine.  Average blood pressure has been 130/60s.    Is now on valsartan/hydrochlorothiazide and carvedilol.      Past Medical History:   Diagnosis Date    Chickenpox     Essential hypertension, benign 6/5/2012    GERD (gastroesophageal reflux disease)     [place] measles     Mumps     Nasal drainage     Nonspecific abnormal electrocardiogram (ECG) (EKG) 6/5/2012    Other chest pain 6/5/2012         Past Surgical History:   Procedure Laterality Date    CHOLECYSTECTOMY      HYSTERECTOMY LAPAROSCOPY           Current Outpatient Medications   Medication Sig Dispense Refill    hydrOXYzine HCl (ATARAX) 10 MG Tab hydroxyzine HCl 10 mg tablet   TAKE 1/2 TABLET BY MOUTH DAILY AS NEEDED FOR ANXIETY      estradiol (VIVELLE DOT) 0.1 MG/24HR PATCH BIWEEKLY APPLY 1 PATCH TOPICALLY TO THE SKIN 2 TIMES EVERY WEEK      carvedilol (COREG) 25 MG Tab TAKE 1 TABLET BY MOUTH TWICE DAILY WITH MEALS 180 Tablet 0    valsartan-hydrochlorothiazide (DIOVAN-HCT) 320-12.5 MG per tablet TAKE 1 TABLET BY MOUTH EVERY DAY 90 Tablet 3    potassium chloride (KLOR-CON) 8 MEQ tablet TAKE 1 TABLET BY MOUTH EVERY DAY (Patient taking differently: Take 24 mEq by mouth every day.) 90 Tab 0    Cranberry (THERACRAN PO) Take  by mouth.      vitamin D (CHOLECALCIFEROL) 1000 UNIT Tab Take 1,000 Units by mouth every day. 2000mg      Probiotic Product (PROBIOTIC  "DAILY PO) Take 1 Each by mouth every day.      Calcium Carbonate-Vitamin D (CALCIUM + D PO) Take 500 mg by mouth every day.      Multiple Vitamin (MULTI-VITAMIN DAILY PO) Take  by mouth every day.      omeprazole (PRILOSEC) 20 MG CPDR Take 20 mg by mouth every day.         No current facility-administered medications for this visit.         Allergies   Allergen Reactions    Macrodantin [Nitrofurantoin Macrocrystal] Itching and Swelling    Amlodipine Swelling    Augmentin Itching     Headaches    Z-Pako [Azithromycin] Itching and Nausea         Family History   Problem Relation Age of Onset    Lung Disease Mother     Lung Disease Father     Cancer Father          Social History     Socioeconomic History    Marital status:      Spouse name: Not on file    Number of children: Not on file    Years of education: Not on file    Highest education level: Not on file   Occupational History    Not on file   Tobacco Use    Smoking status: Never    Smokeless tobacco: Never   Vaping Use    Vaping Use: Never used   Substance and Sexual Activity    Alcohol use: Yes     Alcohol/week: 1.2 - 1.8 oz     Types: 2 Glasses of wine per week     Comment: occ    Drug use: No    Sexual activity: Not on file   Other Topics Concern    Not on file   Social History Narrative    Not on file     Social Determinants of Health     Financial Resource Strain: Not on file   Food Insecurity: Not on file   Transportation Needs: Not on file   Physical Activity: Not on file   Stress: Not on file   Social Connections: Not on file   Intimate Partner Violence: Not on file   Housing Stability: Not on file         Physical Exam:  Ambulatory Vitals  /60 (BP Location: Left arm, Patient Position: Sitting, BP Cuff Size: Adult)   Pulse 74   Resp 18   Ht 1.575 m (5' 2\")   Wt 69.4 kg (153 lb)   SpO2 96%    Oxygen Therapy:  Pulse Oximetry: 96 %  BP Readings from Last 4 Encounters:   10/07/22 134/60   03/07/22 120/60   11/09/21 130/62   06/02/21 120/58 "       Weight/BMI: Body mass index is 27.98 kg/m².  Wt Readings from Last 4 Encounters:   10/07/22 69.4 kg (153 lb)   03/07/22 69.4 kg (153 lb)   11/09/21 68.9 kg (152 lb)   06/02/21 68.5 kg (151 lb)       GEN: Well developed, well nourished and in no acute distress.  HEART: no significant JVD, regular rate and rhythm, normal S1 and S2, no murmurs, no third heart sounds, normal cardiac palpation  LUNG: clear to auscultation bilaterally, no wheezing, no crackles, normal respiratory effort on room air  ABDOMEN: soft, non-tender, non-distended, normal bowel sounds throughout  EXTREMITIES: no peripheral edema noted  VASCULAR: no significantly elevated jugular venous pressure, no carotid bruits noted, radial pulses 2+ and equal      Lab Data Review:  Lab Results   Component Value Date/Time    CHOLSTRLTOT 205 (H) 03/04/2022 10:43 AM     (H) 03/04/2022 10:43 AM    HDL 59 03/04/2022 10:43 AM    TRIGLYCERIDE 108 03/04/2022 10:43 AM       Lab Results   Component Value Date/Time    SODIUM 131 (L) 03/04/2022 10:43 AM    POTASSIUM 4.4 03/04/2022 10:43 AM    CHLORIDE 97 03/04/2022 10:43 AM    CO2 26 03/04/2022 10:43 AM    GLUCOSE 84 03/04/2022 10:43 AM    BUN 16 03/04/2022 10:43 AM    CREATININE 0.87 03/04/2022 10:43 AM    BUNCREATRAT 23 07/27/2015 11:37 AM     Lab Results   Component Value Date/Time    ALKPHOSPHAT 85 03/04/2022 10:43 AM    ASTSGOT 15 03/04/2022 10:43 AM    ALTSGPT 18 03/04/2022 10:43 AM    TBILIRUBIN 0.3 03/04/2022 10:43 AM      Lab Results   Component Value Date/Time    WBC 5.1 03/04/2022 10:43 AM       Cardiac Imaging and Procedures Review:    ECG: ECG performed 3/7/2022 was interpreted by me which shows sinus rhythm    Echo: Echocardiogram performed on 5/26/2022 was personally interpreted by me which shows normal left ventricular size and function, no wall motion abnormality.    Echo dated 9/12/2019:   CONCLUSIONS  No prior study is available for comparison.  Mild asymmetric septal hypertrophy.  Normal  left ventricular systolic function.  Left ventricular ejection fraction is visually estimated to be 70%.  Normal right ventricular size and systolic function.  Normal left atrial size.  Mild MAC. No mitral stenosis.  Trace mitral regurgitation.  Structurally normal aortic valve without significant stenosis or   regurgitation.  Normal pericardium without effusion.  Normal aortic root for body surface area.      Assessment & Plan     1. Essential hypertension        2. Hypokalemia            Doing well from a cardiovascular perspective.  Blood pressure within goal with no further episodes of dizziness or lightheadedness.  Continue carvedilol 25 mg twice daily and valsartan-HCTZ 320-12.5 mg daily.    Reviewed most recent labs which shows normal potassium level.  We discussed continuing potassium supplement with history of hypokalemia and being on HCTZ.    Is planning to go to [place], [place] for 3 months in the wintertime.      All of patient's excellent questions were answered to the best of my knowledge and to her satisfaction.  It was a pleasure seeing Ms. Danica Rapp in my clinic today. Return in about 1 year (around 10/7/2023). Patient is aware to call the cardiology clinic with any questions or concerns.      Fidencio Douglas MD  [facility]  [facility], [address]  [address] [address]  Phone: [phone]  Fax: [phone]    Please note that this dictation was created using voice recognition software. I have made every reasonable attempt to correct obvious errors, but it is possible there are errors of grammar and possibly content that I did not discover before finalizing the note.

## 2022-10-10 ENCOUNTER — HOSPITAL ENCOUNTER (OUTPATIENT)
Facility: MEDICAL CENTER | Age: 82
End: 2022-10-10
Payer: MEDICARE

## 2022-10-10 ENCOUNTER — APPOINTMENT (RX ONLY)
Dept: URBAN - METROPOLITAN AREA CLINIC 4 | Facility: CLINIC | Age: 82
Setting detail: DERMATOLOGY
End: 2022-10-10

## 2022-10-10 DIAGNOSIS — L57.8 OTHER SKIN CHANGES DUE TO CHRONIC EXPOSURE TO NONIONIZING RADIATION: ICD-10-CM

## 2022-10-10 DIAGNOSIS — L57.0 ACTINIC KERATOSIS: ICD-10-CM

## 2022-10-10 DIAGNOSIS — L82.1 OTHER SEBORRHEIC KERATOSIS: ICD-10-CM

## 2022-10-10 DIAGNOSIS — D18.0 HEMANGIOMA: ICD-10-CM

## 2022-10-10 DIAGNOSIS — L81.4 OTHER MELANIN HYPERPIGMENTATION: ICD-10-CM

## 2022-10-10 DIAGNOSIS — D22 MELANOCYTIC NEVI: ICD-10-CM

## 2022-10-10 DIAGNOSIS — Z85.828 PERSONAL HISTORY OF OTHER MALIGNANT NEOPLASM OF SKIN: ICD-10-CM

## 2022-10-10 PROBLEM — D22.5 MELANOCYTIC NEVI OF TRUNK: Status: ACTIVE | Noted: 2022-10-10

## 2022-10-10 PROBLEM — D48.5 NEOPLASM OF UNCERTAIN BEHAVIOR OF SKIN: Status: ACTIVE | Noted: 2022-10-10

## 2022-10-10 PROBLEM — D18.01 HEMANGIOMA OF SKIN AND SUBCUTANEOUS TISSUE: Status: ACTIVE | Noted: 2022-10-10

## 2022-10-10 PROCEDURE — 17003 DESTRUCT PREMALG LES 2-14: CPT

## 2022-10-10 PROCEDURE — 87077 CULTURE AEROBIC IDENTIFY: CPT

## 2022-10-10 PROCEDURE — 87186 SC STD MICRODIL/AGAR DIL: CPT

## 2022-10-10 PROCEDURE — 87086 URINE CULTURE/COLONY COUNT: CPT

## 2022-10-10 PROCEDURE — 17000 DESTRUCT PREMALG LESION: CPT | Mod: 59

## 2022-10-10 PROCEDURE — ? DIAGNOSIS COMMENT

## 2022-10-10 PROCEDURE — ? COUNSELING

## 2022-10-10 PROCEDURE — 11102 TANGNTL BX SKIN SINGLE LES: CPT

## 2022-10-10 PROCEDURE — ? LIQUID NITROGEN

## 2022-10-10 PROCEDURE — ? BIOPSY BY SHAVE METHOD

## 2022-10-10 PROCEDURE — 99213 OFFICE O/P EST LOW 20 MIN: CPT | Mod: 25

## 2022-10-10 ASSESSMENT — LOCATION SIMPLE DESCRIPTION DERM
LOCATION SIMPLE: LEFT HAND
LOCATION SIMPLE: LEFT UPPER BACK
LOCATION SIMPLE: LEFT FOREARM
LOCATION SIMPLE: LEFT EAR
LOCATION SIMPLE: RIGHT CHEEK
LOCATION SIMPLE: RIGHT POSTERIOR UPPER ARM
LOCATION SIMPLE: RIGHT FOREARM
LOCATION SIMPLE: LEFT POSTERIOR UPPER ARM
LOCATION SIMPLE: LEFT CHEEK
LOCATION SIMPLE: CHEST
LOCATION SIMPLE: RIGHT HAND
LOCATION SIMPLE: RIGHT UPPER BACK

## 2022-10-10 ASSESSMENT — LOCATION DETAILED DESCRIPTION DERM
LOCATION DETAILED: LEFT INFERIOR CENTRAL MALAR CHEEK
LOCATION DETAILED: LEFT MEDIAL SUPERIOR CHEST
LOCATION DETAILED: RIGHT SUPERIOR MEDIAL UPPER BACK
LOCATION DETAILED: LEFT DISTAL POSTERIOR UPPER ARM
LOCATION DETAILED: RIGHT DISTAL DORSAL FOREARM
LOCATION DETAILED: LEFT DISTAL DORSAL FOREARM
LOCATION DETAILED: RIGHT INFERIOR CENTRAL MALAR CHEEK
LOCATION DETAILED: RIGHT DISTAL POSTERIOR UPPER ARM
LOCATION DETAILED: RIGHT ULNAR DORSAL HAND
LOCATION DETAILED: LEFT SUPERIOR HELIX
LOCATION DETAILED: RIGHT RADIAL DORSAL HAND
LOCATION DETAILED: LEFT SUPERIOR UPPER BACK
LOCATION DETAILED: RIGHT PROXIMAL DORSAL FOREARM
LOCATION DETAILED: RIGHT MID-UPPER BACK
LOCATION DETAILED: LEFT SUPERIOR MEDIAL UPPER BACK
LOCATION DETAILED: LEFT RADIAL DORSAL HAND

## 2022-10-10 ASSESSMENT — LOCATION ZONE DERM
LOCATION ZONE: EAR
LOCATION ZONE: FACE
LOCATION ZONE: ARM
LOCATION ZONE: HAND
LOCATION ZONE: TRUNK

## 2022-10-10 NOTE — PROCEDURE: DIAGNOSIS COMMENT
Comment: [id number] A, hx of SCC well differentiated. excised by Dr. Hampton
Detail Level: Detailed
Render Risk Assessment In Note?: no
Comment: Mohs scar, hx of BCC Nodular type. Treated by Dr. Hampton. [id number]

## 2022-10-10 NOTE — PROCEDURE: LIQUID NITROGEN
Consent: The patient's consent was obtained including but not limited to risks of crusting, scabbing, blistering, scarring, darker or lighter pigmentary change, recurrence, incomplete removal and infection.
Show Aperture Variable?: Yes
Detail Level: Simple
Duration Of Freeze Thaw-Cycle (Seconds): 3
Post-Care Instructions: I reviewed with the patient in detail post-care instructions. Patient is to wear sunprotection, and avoid picking at any of the treated lesions. Pt may apply Vaseline to crusted or scabbing areas.
Aperture Size (Optional): C
Number Of Freeze-Thaw Cycles: 2 freeze-thaw cycles
Render Post-Care Instructions In Note?: no

## 2022-10-10 NOTE — PROCEDURE: MIPS QUALITY
Detail Level: Detailed
Quality 130: Documentation Of Current Medications In The Medical Record: Current Medications Documented
Quality 226: Preventive Care And Screening: Tobacco Use: Screening And Cessation Intervention: Patient screened for tobacco use and is an ex/non-smoker
Quality 111:Pneumonia Vaccination Status For Older Adults: Pneumococcal vaccine (PPSV23) administered on or after patient’s 60th birthday and before the end of the measurement period

## 2022-10-13 LAB
BACTERIA UR CULT: ABNORMAL
BACTERIA UR CULT: ABNORMAL
SIGNIFICANT IND 70042: ABNORMAL
SITE SITE: ABNORMAL
SOURCE SOURCE: ABNORMAL

## 2022-10-27 ENCOUNTER — APPOINTMENT (RX ONLY)
Dept: URBAN - METROPOLITAN AREA CLINIC 36 | Facility: CLINIC | Age: 82
Setting detail: DERMATOLOGY
End: 2022-10-27

## 2022-10-27 DIAGNOSIS — Z41.9 ENCOUNTER FOR PROCEDURE FOR PURPOSES OTHER THAN REMEDYING HEALTH STATE, UNSPECIFIED: ICD-10-CM

## 2022-10-27 PROCEDURE — ? SCITON BBL

## 2022-10-27 ASSESSMENT — LOCATION ZONE DERM
LOCATION ZONE: NOSE
LOCATION ZONE: LIP
LOCATION ZONE: FACE

## 2022-10-27 ASSESSMENT — LOCATION DETAILED DESCRIPTION DERM
LOCATION DETAILED: RIGHT FOREHEAD
LOCATION DETAILED: RIGHT CENTRAL MALAR CHEEK
LOCATION DETAILED: NASAL ROOT
LOCATION DETAILED: PHILTRUM
LOCATION DETAILED: RIGHT LOWER CUTANEOUS LIP
LOCATION DETAILED: LEFT INFERIOR CENTRAL MALAR CHEEK
LOCATION DETAILED: INFERIOR MID FOREHEAD
LOCATION DETAILED: NASAL DORSUM

## 2022-10-27 ASSESSMENT — LOCATION SIMPLE DESCRIPTION DERM
LOCATION SIMPLE: UPPER LIP
LOCATION SIMPLE: RIGHT CHEEK
LOCATION SIMPLE: INFERIOR FOREHEAD
LOCATION SIMPLE: LEFT CHEEK
LOCATION SIMPLE: RIGHT LIP
LOCATION SIMPLE: NOSE
LOCATION SIMPLE: RIGHT FOREHEAD

## 2022-10-27 NOTE — PROCEDURE: SCITON BBL
Pulse Duration: 20
Pulse Duration Units: milliseconds
Spot Size: Finesse Adapter Size: 15 x 15 mm square
Cooling ?: Yes
Passes: 1
Cooling (In C): 15
Fluence (J/Cm2): 8
Post-Care Instructions: I reviewed with the patient in detail post-care instructions. Patient should stay away from the sun and wear sun protection until treated areas are fully healed.
Spot Size: Finesse Adapter Size: 15 x 45 mm (No Finesse Adapter)
Price (Use Numbers Only, No Special Characters Or $): 150.00
Cooling (In C): 22
Consent: Written consent obtained, risks reviewed including but not limited to crusting, scabbing, blistering, scarring, darker or lighter pigmentary change, bruising, and/or incomplete response.
Hide Repetition Rate?: No
Anesthesia Volume In Cc: 0
Fluence (J/Cm2): 9
Fluence (J/Cm2): 25
Preprocedure Text: The treatment areas were thoroughly cleaned. Any exposed at risk hair that was not to be treated was covered in white paper tape. Clear ultrasound gel was applied to the treatment area. The area was treated with no immediate stacking of pulses.
Repetition Rate (Hz): 10
Detail Level: Zone
Post Procedure Text: The patient tolerated the procedure well. Ice-chilled washclothes were applied to the treatment area for comfort. Post care was reviewed with the patient.

## 2022-11-09 ENCOUNTER — PATIENT MESSAGE (OUTPATIENT)
Dept: HEALTH INFORMATION MANAGEMENT | Facility: OTHER | Age: 82
End: 2022-11-09

## 2022-11-11 ENCOUNTER — HOSPITAL ENCOUNTER (OUTPATIENT)
Facility: MEDICAL CENTER | Age: 82
End: 2022-11-11
Attending: PHYSICIAN ASSISTANT
Payer: MEDICARE

## 2022-11-11 PROCEDURE — 87077 CULTURE AEROBIC IDENTIFY: CPT

## 2022-11-11 PROCEDURE — 87086 URINE CULTURE/COLONY COUNT: CPT

## 2022-11-11 PROCEDURE — 87186 SC STD MICRODIL/AGAR DIL: CPT

## 2022-11-17 ENCOUNTER — HOSPITAL ENCOUNTER (OUTPATIENT)
Dept: LAB | Facility: MEDICAL CENTER | Age: 82
End: 2022-11-17
Attending: PHYSICIAN ASSISTANT
Payer: MEDICARE

## 2022-11-17 LAB
ANION GAP SERPL CALC-SCNC: 8 MMOL/L (ref 7–16)
BUN SERPL-MCNC: 15 MG/DL (ref 8–22)
CALCIUM SERPL-MCNC: 9.4 MG/DL (ref 8.5–10.5)
CHLORIDE SERPL-SCNC: 94 MMOL/L (ref 96–112)
CHOLEST SERPL-MCNC: 195 MG/DL (ref 100–199)
CO2 SERPL-SCNC: 28 MMOL/L (ref 20–33)
CREAT SERPL-MCNC: 0.82 MG/DL (ref 0.5–1.4)
FASTING STATUS PATIENT QL REPORTED: NORMAL
GFR SERPLBLD CREATININE-BSD FMLA CKD-EPI: 71 ML/MIN/1.73 M 2
GLUCOSE SERPL-MCNC: 85 MG/DL (ref 65–99)
HDLC SERPL-MCNC: 48 MG/DL
LDLC SERPL CALC-MCNC: 127 MG/DL
POTASSIUM SERPL-SCNC: 4.3 MMOL/L (ref 3.6–5.5)
SODIUM SERPL-SCNC: 130 MMOL/L (ref 135–145)
TRIGL SERPL-MCNC: 102 MG/DL (ref 0–149)

## 2022-11-17 PROCEDURE — 80048 BASIC METABOLIC PNL TOTAL CA: CPT

## 2022-11-17 PROCEDURE — 80061 LIPID PANEL: CPT

## 2022-11-17 PROCEDURE — 36415 COLL VENOUS BLD VENIPUNCTURE: CPT

## 2022-11-22 DIAGNOSIS — I10 ESSENTIAL HYPERTENSION: ICD-10-CM

## 2022-11-22 RX ORDER — CARVEDILOL 25 MG/1
25 TABLET ORAL 2 TIMES DAILY WITH MEALS
Qty: 180 TABLET | Refills: 3 | Status: SHIPPED | OUTPATIENT
Start: 2022-11-22 | End: 2023-11-16

## 2022-11-22 NOTE — TELEPHONE ENCOUNTER
"MEDICATION REFILL : DA    Caller: Danica Rapp    Medication Name and Dosage:     CARVEDILOL 25MG    Please call your pharmacy and have them send us a refill request or speak to a live representative, RX number may have changed.    Medication amount left: \"ENOUGH\"    Preferred Pharmacy:     [id number] - [place], [place] - [address] AT [facility] [address]    Other questions (Topic): VACATION    Patient states that she is leaving on vacation for 3-4 mnths THIS WEEKEND and she is needing a new script to take with her as she travels. Please advise.    Thank you,  Tapan LINARES    Callback Number (Will only call for issues): [phone] (home)       "

## 2022-11-22 NOTE — TELEPHONE ENCOUNTER
Is the patient due for a refill? Yes    Was the patient seen the past year? Yes    Date of last office visit: 10/07/2022    Does the patient have an upcoming appointment?  No    Provider to refill:SW    Does the patients insurance require a 100 day supply?  No

## 2023-03-06 DIAGNOSIS — I10 ESSENTIAL HYPERTENSION, BENIGN: ICD-10-CM

## 2023-03-10 ENCOUNTER — APPOINTMENT (RX ONLY)
Dept: URBAN - METROPOLITAN AREA CLINIC 4 | Facility: CLINIC | Age: 83
Setting detail: DERMATOLOGY
End: 2023-03-10

## 2023-03-10 DIAGNOSIS — L50.3 DERMATOGRAPHIC URTICARIA: ICD-10-CM

## 2023-03-10 DIAGNOSIS — L82.0 INFLAMED SEBORRHEIC KERATOSIS: ICD-10-CM

## 2023-03-10 DIAGNOSIS — L82.1 OTHER SEBORRHEIC KERATOSIS: ICD-10-CM

## 2023-03-10 DIAGNOSIS — Z85.828 PERSONAL HISTORY OF OTHER MALIGNANT NEOPLASM OF SKIN: ICD-10-CM

## 2023-03-10 DIAGNOSIS — D22 MELANOCYTIC NEVI: ICD-10-CM

## 2023-03-10 DIAGNOSIS — L81.4 OTHER MELANIN HYPERPIGMENTATION: ICD-10-CM | Status: STABLE

## 2023-03-10 DIAGNOSIS — L57.0 ACTINIC KERATOSIS: ICD-10-CM

## 2023-03-10 DIAGNOSIS — D18.0 HEMANGIOMA: ICD-10-CM

## 2023-03-10 PROBLEM — D18.01 HEMANGIOMA OF SKIN AND SUBCUTANEOUS TISSUE: Status: ACTIVE | Noted: 2023-03-10

## 2023-03-10 PROBLEM — D22.5 MELANOCYTIC NEVI OF TRUNK: Status: ACTIVE | Noted: 2023-03-10

## 2023-03-10 PROCEDURE — 99213 OFFICE O/P EST LOW 20 MIN: CPT | Mod: 25

## 2023-03-10 PROCEDURE — ? ADDITIONAL NOTES

## 2023-03-10 PROCEDURE — ? DIAGNOSIS COMMENT

## 2023-03-10 PROCEDURE — 17110 DESTRUCTION B9 LES UP TO 14: CPT

## 2023-03-10 PROCEDURE — 17003 DESTRUCT PREMALG LES 2-14: CPT | Mod: 59

## 2023-03-10 PROCEDURE — ? OBSERVATION AND MEASURE

## 2023-03-10 PROCEDURE — 17000 DESTRUCT PREMALG LESION: CPT | Mod: 59

## 2023-03-10 PROCEDURE — ? LIQUID NITROGEN

## 2023-03-10 PROCEDURE — ? COUNSELING

## 2023-03-10 ASSESSMENT — LOCATION DETAILED DESCRIPTION DERM
LOCATION DETAILED: LEFT RADIAL DORSAL HAND
LOCATION DETAILED: RIGHT SUPERIOR MEDIAL UPPER BACK
LOCATION DETAILED: 2ND WEB SPACE RIGHT HAND
LOCATION DETAILED: LEFT PROXIMAL RADIAL DORSAL FOREARM
LOCATION DETAILED: LEFT DISTAL DORSAL FOREARM
LOCATION DETAILED: LEFT INFERIOR CENTRAL BUCCAL CHEEK
LOCATION DETAILED: INFERIOR THORACIC SPINE
LOCATION DETAILED: RIGHT PROXIMAL PRETIBIAL REGION
LOCATION DETAILED: RIGHT DISTAL POSTERIOR UPPER ARM
LOCATION DETAILED: RIGHT INGUINAL CREASE
LOCATION DETAILED: RIGHT LATERAL BUCCAL CHEEK
LOCATION DETAILED: RIGHT DISTAL DORSAL FOREARM
LOCATION DETAILED: RIGHT DORSAL WRIST
LOCATION DETAILED: LEFT SUPERIOR HELIX
LOCATION DETAILED: RIGHT RADIAL DORSAL HAND
LOCATION DETAILED: MONS PUBIS
LOCATION DETAILED: RIGHT PROXIMAL DORSAL FOREARM
LOCATION DETAILED: SUPERIOR THORACIC SPINE

## 2023-03-10 ASSESSMENT — LOCATION SIMPLE DESCRIPTION DERM
LOCATION SIMPLE: RIGHT FOREARM
LOCATION SIMPLE: RIGHT UPPER BACK
LOCATION SIMPLE: RIGHT CHEEK
LOCATION SIMPLE: LEFT HAND
LOCATION SIMPLE: LEFT FOREARM
LOCATION SIMPLE: RIGHT HAND
LOCATION SIMPLE: UPPER BACK
LOCATION SIMPLE: GROIN
LOCATION SIMPLE: RIGHT POSTERIOR UPPER ARM
LOCATION SIMPLE: RIGHT PRETIBIAL REGION
LOCATION SIMPLE: RIGHT WRIST
LOCATION SIMPLE: LEFT EAR
LOCATION SIMPLE: LEFT CHEEK

## 2023-03-10 ASSESSMENT — LOCATION ZONE DERM
LOCATION ZONE: LEG
LOCATION ZONE: EAR
LOCATION ZONE: VULVA
LOCATION ZONE: FACE
LOCATION ZONE: HAND
LOCATION ZONE: ARM
LOCATION ZONE: TRUNK

## 2023-03-10 NOTE — PROCEDURE: DIAGNOSIS COMMENT
Comment: [id number] A, hx of SCC well differentiated. excised by Dr. Hampton
Detail Level: Detailed
Render Risk Assessment In Note?: no
Comment: Mohs scar, hx of BCC Nodular type. Treated by Dr. Hampton. [id number]
Comment: Slightly atypical\\n\\nSee photos 11/4/2020

## 2023-03-10 NOTE — PROCEDURE: ADDITIONAL NOTES
Additional Notes: Itching began when on antibiotics for UTI.  Is slowly improving with daily Claritin and Benadryl qhs.  States gets whelts when she scratches.  Has bloodwork with primary MD routinely and negative.  Recommend patient take Xyzal and Benadryl every day for 4-6 weeks. F/u if worsens. Continue emollients.
Detail Level: Simple
Render Risk Assessment In Note?: no

## 2023-03-10 NOTE — PROCEDURE: LIQUID NITROGEN
Render Note In Bullet Format When Appropriate: No
Show Applicator Variable?: Yes
Detail Level: Detailed
Consent: The patient's consent was obtained including but not limited to risks of crusting, scabbing, blistering, scarring, darker or lighter pigmentary change, recurrence, incomplete removal and infection.
Post-Care Instructions: I reviewed with the patient in detail post-care instructions. Patient is to wear sunprotection, and avoid picking at any of the treated lesions. Pt may apply Vaseline to crusted or scabbing areas.
Duration Of Freeze Thaw-Cycle (Seconds): 0
Medical Necessity Information: It is in your best interest to select a reason for this procedure from the list below. All of these items fulfill various CMS LCD requirements except the new and changing color options.
Spray Paint Text: The liquid nitrogen was applied to the skin utilizing a spray paint frosting technique.
Medical Necessity Clause: This procedure was medically necessary because the lesions that were treated were:

## 2023-03-14 RX ORDER — VALSARTAN AND HYDROCHLOROTHIAZIDE 320; 12.5 MG/1; MG/1
1 TABLET, FILM COATED ORAL DAILY
Qty: 90 TABLET | Refills: 2 | Status: SHIPPED | OUTPATIENT
Start: 2023-03-14 | End: 2023-11-16

## 2023-03-14 NOTE — TELEPHONE ENCOUNTER
Per pt's chart, last labs from November 2022, Na+ 130.    To Dr. Douglas for review and confirmation of RX per RX refill protocol. Thank you!

## 2023-03-24 ENCOUNTER — HOSPITAL ENCOUNTER (OUTPATIENT)
Facility: MEDICAL CENTER | Age: 83
End: 2023-03-24
Attending: PHYSICIAN ASSISTANT
Payer: MEDICARE

## 2023-03-24 PROCEDURE — 87086 URINE CULTURE/COLONY COUNT: CPT

## 2023-03-26 LAB
BACTERIA UR CULT: NORMAL
SIGNIFICANT IND 70042: NORMAL
SITE SITE: NORMAL
SOURCE SOURCE: NORMAL

## 2023-04-07 ENCOUNTER — HOSPITAL ENCOUNTER (OUTPATIENT)
Facility: MEDICAL CENTER | Age: 83
End: 2023-04-07
Attending: PHYSICIAN ASSISTANT
Payer: MEDICARE

## 2023-04-07 PROCEDURE — 87186 SC STD MICRODIL/AGAR DIL: CPT

## 2023-04-07 PROCEDURE — 87077 CULTURE AEROBIC IDENTIFY: CPT

## 2023-04-07 PROCEDURE — 87086 URINE CULTURE/COLONY COUNT: CPT

## 2023-08-17 ENCOUNTER — OFFICE VISIT (OUTPATIENT)
Dept: CARDIOLOGY | Facility: MEDICAL CENTER | Age: 83
End: 2023-08-17
Attending: INTERNAL MEDICINE
Payer: MEDICARE

## 2023-08-17 ENCOUNTER — HOSPITAL ENCOUNTER (OUTPATIENT)
Dept: LAB | Facility: MEDICAL CENTER | Age: 83
End: 2023-08-17
Attending: INTERNAL MEDICINE
Payer: MEDICARE

## 2023-08-17 VITALS
BODY MASS INDEX: 29.44 KG/M2 | OXYGEN SATURATION: 94 % | SYSTOLIC BLOOD PRESSURE: 138 MMHG | HEIGHT: 62 IN | HEART RATE: 75 BPM | DIASTOLIC BLOOD PRESSURE: 70 MMHG | RESPIRATION RATE: 14 BRPM | WEIGHT: 160 LBS

## 2023-08-17 DIAGNOSIS — R07.89 OTHER CHEST PAIN: ICD-10-CM

## 2023-08-17 DIAGNOSIS — R06.09 DOE (DYSPNEA ON EXERTION): ICD-10-CM

## 2023-08-17 DIAGNOSIS — E78.5 DYSLIPIDEMIA: ICD-10-CM

## 2023-08-17 DIAGNOSIS — I10 ESSENTIAL HYPERTENSION, BENIGN: ICD-10-CM

## 2023-08-17 LAB
ANION GAP SERPL CALC-SCNC: 9 MMOL/L (ref 7–16)
BUN SERPL-MCNC: 13 MG/DL (ref 8–22)
CALCIUM SERPL-MCNC: 9.3 MG/DL (ref 8.4–10.2)
CHLORIDE SERPL-SCNC: 98 MMOL/L (ref 96–112)
CO2 SERPL-SCNC: 29 MMOL/L (ref 20–33)
CREAT SERPL-MCNC: 0.75 MG/DL (ref 0.5–1.4)
GFR SERPLBLD CREATININE-BSD FMLA CKD-EPI: 79 ML/MIN/1.73 M 2
GLUCOSE SERPL-MCNC: 97 MG/DL (ref 65–99)
NT-PROBNP SERPL IA-MCNC: 204 PG/ML (ref 0–125)
POTASSIUM SERPL-SCNC: 4.1 MMOL/L (ref 3.6–5.5)
SODIUM SERPL-SCNC: 136 MMOL/L (ref 135–145)

## 2023-08-17 PROCEDURE — 3075F SYST BP GE 130 - 139MM HG: CPT | Performed by: INTERNAL MEDICINE

## 2023-08-17 PROCEDURE — 99213 OFFICE O/P EST LOW 20 MIN: CPT | Performed by: INTERNAL MEDICINE

## 2023-08-17 PROCEDURE — 99214 OFFICE O/P EST MOD 30 MIN: CPT | Performed by: INTERNAL MEDICINE

## 2023-08-17 PROCEDURE — 83880 ASSAY OF NATRIURETIC PEPTIDE: CPT

## 2023-08-17 PROCEDURE — 36415 COLL VENOUS BLD VENIPUNCTURE: CPT

## 2023-08-17 PROCEDURE — 3078F DIAST BP <80 MM HG: CPT | Performed by: INTERNAL MEDICINE

## 2023-08-17 PROCEDURE — 80048 BASIC METABOLIC PNL TOTAL CA: CPT

## 2023-08-17 ASSESSMENT — FIBROSIS 4 INDEX: FIB4 SCORE: 0.71

## 2023-08-17 NOTE — PROGRESS NOTES
Cardiology Follow-up Consultation Note    Date of note:    8/17/2023    Primary Care Provider: Jazmyne Maddox P.A.-C.    Name:             Danica Rapp   YOB: 1940  MRN:               [id number]    Chief Complaint   Patient presents with    HTN (Controlled)     F/V Dx: Essential hypertension       HISTORY OF PRESENT ILLNESS  Ms. Danica Rapp is a 81 y.o. female who returns to see us for follow-up of HTN    Last clinic visit: 10/7/2022    Interim History:  Since last visit, has been experiencing shortness of breath and dyspnea on exertion.  Symptoms have progressed over the past few months.  Has not been monitoring her blood pressure on a regular basis.  Has also gained weight due to lack of exercise and dietary indiscretions.  Feels winded with walking short distances.    Dizziness resolved with discontinuing amlodipine.      Past Medical History:   Diagnosis Date    Chickenpox     Essential hypertension, benign 6/5/2012    GERD (gastroesophageal reflux disease)     [place]vor[place] measles     Mumps     Nasal drainage     Nonspecific abnormal electrocardiogram (ECG) (EKG) 6/5/2012    Other chest pain 6/5/2012         Past Surgical History:   Procedure Laterality Date    CHOLECYSTECTOMY      HYSTERECTOMY LAPAROSCOPY           Current Outpatient Medications   Medication Sig Dispense Refill    valsartan-hydrochlorothiazide (DIOVAN-HCT) 320-12.5 MG per tablet TAKE 1 TABLET BY MOUTH EVERY DAY 90 Tablet 2    carvedilol (COREG) 25 MG Tab Take 1 Tablet by mouth 2 times a day with meals. 180 Tablet 3    estradiol (VIVELLE DOT) 0.1 MG/24HR PATCH BIWEEKLY APPLY 1 PATCH TOPICALLY TO THE SKIN 2 TIMES EVERY WEEK      potassium chloride (KLOR-CON) 8 MEQ tablet TAKE 1 TABLET BY MOUTH EVERY DAY (Patient taking differently: Take 24 mEq by mouth every day.) 90 Tab 0    Cranberry (THERACRAN PO) Take  by mouth.      vitamin D (CHOLECALCIFEROL) 1000 UNIT Tab Take 1,000 Units by mouth every day. 2000mg      Probiotic  Product (PROBIOTIC DAILY PO) Take 1 Each by mouth every day.      Calcium Carbonate-Vitamin D (CALCIUM + D PO) Take 500 mg by mouth every day.      Multiple Vitamin (MULTI-VITAMIN DAILY PO) Take  by mouth every day.      omeprazole (PRILOSEC) 20 MG CPDR Take 20 mg by mouth every day.        meloxicam (MOBIC) 7.5 MG Tab Take 1 Tablet by mouth every day. 30 Tablet 0    cyclobenzaprine (FLEXERIL) 5 mg tablet Take 1-2 Tablets by mouth at bedtime as needed for Muscle Spasms. 30 Tablet 0    hydrOXYzine HCl (ATARAX) 10 MG Tab hydroxyzine HCl 10 mg tablet   TAKE 1/2 TABLET BY MOUTH DAILY AS NEEDED FOR ANXIETY       No current facility-administered medications for this visit.         Allergies   Allergen Reactions    Macrodantin [Nitrofurantoin Macrocrystal] Itching and Swelling    Amlodipine Swelling    Augmentin Itching     Headaches    Z-Pako [Azithromycin] Itching and Nausea         Family History   Problem Relation Age of Onset    Lung Disease Mother     Lung Disease Father     Cancer Father          Social History     Socioeconomic History    Marital status:      Spouse name: Not on file    Number of children: Not on file    Years of education: Not on file    Highest education level: Not on file   Occupational History    Not on file   Tobacco Use    Smoking status: Never    Smokeless tobacco: Never   Vaping Use    Vaping Use: Never used   Substance and Sexual Activity    Alcohol use: Yes     Alcohol/week: 1.2 - 1.8 oz     Types: 2 Glasses of wine per week     Comment: occ    Drug use: No    Sexual activity: Not on file   Other Topics Concern    Not on file   Social History Narrative    Not on file     Social Determinants of Health     Financial Resource Strain: Not on file   Food Insecurity: Not on file   Transportation Needs: Not on file   Physical Activity: Not on file   Stress: Not on file   Social Connections: Not on file   Intimate Partner Violence: Not on file   Housing Stability: Not on file  "        Physical Exam:  Ambulatory Vitals  /70 (BP Location: Left arm, Patient Position: Sitting, BP Cuff Size: Adult)   Pulse 75   Resp 14   Ht 1.575 m (5' 2\")   Wt 72.6 kg (160 lb)   SpO2 94%    Oxygen Therapy:  Pulse Oximetry: 94 %  BP Readings from Last 4 Encounters:   08/17/23 138/70   10/07/22 134/60   03/07/22 120/60   11/09/21 130/62       Weight/BMI: Body mass index is 29.26 kg/m².  Wt Readings from Last 4 Encounters:   08/17/23 72.6 kg (160 lb)   06/27/23 70.3 kg (155 lb)   10/07/22 69.4 kg (153 lb)   03/07/22 69.4 kg (153 lb)       GEN: Well developed, well nourished and in no acute distress.  HEART: no significant JVD, regular rate and rhythm, normal S1 and S2, no murmurs, no third heart sounds, normal cardiac palpation  LUNG: clear to auscultation bilaterally, no wheezing, no crackles, normal respiratory effort on room air  ABDOMEN: soft, non-tender, non-distended, normal bowel sounds throughout  EXTREMITIES: no peripheral edema noted  VASCULAR: no significantly elevated jugular venous pressure, no carotid bruits noted, radial pulses 2+ and equal      Lab Data Review:  Lab Results   Component Value Date/Time    CHOLSTRLTOT 195 11/17/2022 08:59 AM     (H) 11/17/2022 08:59 AM    HDL 48 11/17/2022 08:59 AM    TRIGLYCERIDE 102 11/17/2022 08:59 AM       Lab Results   Component Value Date/Time    SODIUM 130 (L) 11/17/2022 08:59 AM    POTASSIUM 4.3 11/17/2022 08:59 AM    CHLORIDE 94 (L) 11/17/2022 08:59 AM    CO2 28 11/17/2022 08:59 AM    GLUCOSE 85 11/17/2022 08:59 AM    BUN 15 11/17/2022 08:59 AM    CREATININE 0.82 11/17/2022 08:59 AM    BUNCREATRAT 23 07/27/2015 11:37 AM     Lab Results   Component Value Date/Time    ALKPHOSPHAT 85 03/04/2022 10:43 AM    ASTSGOT 15 03/04/2022 10:43 AM    ALTSGPT 18 03/04/2022 10:43 AM    TBILIRUBIN 0.3 03/04/2022 10:43 AM      Lab Results   Component Value Date/Time    WBC 5.1 03/04/2022 10:43 AM    HEMOGLOBIN 13.0 03/04/2022 10:43 AM       Cardiac Imaging " and Procedures Review:    ECG: ECG performed 3/7/2022 was interpreted by me which shows sinus rhythm    Echo: Echocardiogram performed on 5/26/2022 was personally interpreted by me which shows normal left ventricular size and function, no wall motion abnormality.    Echo dated 9/12/2019:   CONCLUSIONS  No prior study is available for comparison.  Mild asymmetric septal hypertrophy.  Normal left ventricular systolic function.  Left ventricular ejection fraction is visually estimated to be 70%.  Normal right ventricular size and systolic function.  Normal left atrial size.  Mild MAC. No mitral stenosis.  Trace mitral regurgitation.  Structurally normal aortic valve without significant stenosis or   regurgitation.  Normal pericardium without effusion.  Normal aortic root for body surface area.      Assessment & Plan     1. Other chest pain  NM-CARDIAC STRESS TEST    CT-CARDIAC SCORING      2. Essential hypertension, benign  Basic Metabolic Panel      3. BASSETT (dyspnea on exertion)  proBrain Natriuretic Peptide, NT    Basic Metabolic Panel    NM-CARDIAC STRESS TEST      4. Dyslipidemia  CT-CARDIAC SCORING          Patient with new onset dyspnea on exertion and chest tightness.  She is certainly at an intermediate risk for obstructive CAD with age, dyslipidemia not on statin.  Obtain pharmacological nuclear cardiac stress test to rule out obstructive CAD.    Blood pressure slightly elevated in clinic today.  We discussed low-salt diet and monitoring BP on a regular basis at home.  If average blood pressure is greater than 140/80, recommend initiating additional antihypertensive medication.  For now, continue Coreg 25 mg twice daily and valsartan-HCTZ 320-12.5 mg daily.    Obtain NT proBNP to evaluate for possible HFpEF.  Obtain BMP to monitor potassium and kidney function.  She is on high risk medication with valsartan and HCTZ.  Does take significant amount of potassium supplement.    We reviewed lipid panel which shows  LDL above goal.  Hesitant to start statin medication.  Obtain coronary calcium score.  We discussed initiating statin medication if there is evidence of plaque buildup to which she is in agreement with.      All of patient's excellent questions were answered to the best of my knowledge and to her satisfaction.  It was a pleasure seeing Ms. Danica Rapp in my clinic today. Return in about 2 months (around 10/17/2023). Patient is aware to call the cardiology clinic with any questions or concerns.      Fidencio Douglas MD  [facility]  [facility], [facility]  [address] [address]  Phone: [phone]  Fax: [phone]    Please note that this dictation was created using voice recognition software. I have made every reasonable attempt to correct obvious errors, but it is possible there are errors of grammar and possibly content that I did not discover before finalizing the note.

## 2023-08-18 ENCOUNTER — TELEPHONE (OUTPATIENT)
Dept: CARDIOLOGY | Facility: MEDICAL CENTER | Age: 83
End: 2023-08-18
Payer: MEDICARE

## 2023-08-18 NOTE — TELEPHONE ENCOUNTER
GUILLERMO    Caller: Danica Rapp     Topic/issue: Pt called in regards to message above she would like a call back to discuss the medication recommendation, please advise.     Callback Number: [phone] ([place])      Thank you,     Trent SANCHEZ

## 2023-08-18 NOTE — TELEPHONE ENCOUNTER
----- Message from Fidencio Douglas M.D. sent at 8/18/2023 12:21 PM PDT -----  Please let her know that NT proBNP is slightly elevated.  Recommend trial of Jardiance 10 mg daily to see if she feels better with it.  Thanks.

## 2023-08-21 NOTE — TELEPHONE ENCOUNTER
"Phone Number Called: [phone]    Call outcome: Spoke to patient regarding message below.    Message:  Wanted to know more information about medication- Jardiance  Pt has questions for DA, noted below. Answered some questions regarding Jardiance and what ProBNP is used  for. Pt notified that I will reach out to DA for answers to her questions. Will update pt with response  =====================================    DA: Please advise on below questions/concerns    1) Pt concerned about Jardiance side effects. Concerning side effects are UTI and increase in LDL. Pt has history of UTI's which she is still trying to get that under control.  LDL is currently elevated for pt.    2)Pt wanting to know what a \"slightly high ProBNP\" means for her. What is to be expected for her age & condition? She wants to know if she should worry about it or not because she feels well overall and doesn't want to take medication unless truly necessary.    3) Pt wants to know if there are any restrictions or recommendations before she takes a flight or does activities requiring exertion d/t these ProBNP findings & current condition. Pt flying out of town on 08/30 and won't be back until late September    4)Pt wants to know overall how serious everything is. Also wants to know if DA would be okay with her canceling stress test on 08/29 and rescheduled for a few months later when she gets back in town.        "

## 2023-08-22 NOTE — TELEPHONE ENCOUNTER
Phone Number Called: [phone]    Call outcome: Did not leave a detailed message. Requested patient to call back.    Message: Called to discuss DA's answers to pt questions & determine if patient wishes to start Jardiance

## 2023-08-22 NOTE — TELEPHONE ENCOUNTER
Fidencio Douglas M.D.  You 41 minutes ago (10:31 AM)     Jardiance does increase possibility of UTI but with proper hygiene it can be avoided.  There are certain pitfalls with NT-proBNP with advanced age.  However, with ongoing symptoms of shortness of breath on exertion it can represent stiffening of the heart.  No restrictions to flying.  Okay to cancel stress test and reschedule it at a later date

## 2023-08-22 NOTE — TELEPHONE ENCOUNTER
Jardiance does increase possibility of UTI but with proper hygiene it can be avoided.  There are certain pitfalls with NT-proBNP with advanced age.  However, with ongoing symptoms of shortness of breath on exertion it can represent stiffening of the heart.  No restrictions to flying.  Okay to cancel stress test and reschedule it at a later date.

## 2023-08-23 ENCOUNTER — PATIENT MESSAGE (OUTPATIENT)
Dept: CARDIOLOGY | Facility: MEDICAL CENTER | Age: 83
End: 2023-08-23
Payer: MEDICARE

## 2023-08-23 NOTE — TELEPHONE ENCOUNTER
GUILLERMO    Caller: Danica Rapp    Topic/issue: Patient is returning RN's phone call.    Callback Number: [phone]    Thank you,  -Charlotte STANFORD

## 2023-08-23 NOTE — TELEPHONE ENCOUNTER
Called pt [phone]  No answer, left VM to call [phone]  DA response sent to pt via [employer]

## 2023-08-23 NOTE — TELEPHONE ENCOUNTER
Pt returned call.  Advised of DA response sent via [employer]. Pt states this is a good idea as she can read and respond directly via [employer]. All questions answered. Pt verbalized understanding.

## 2023-08-28 ENCOUNTER — HOSPITAL ENCOUNTER (OUTPATIENT)
Dept: RADIOLOGY | Facility: MEDICAL CENTER | Age: 83
End: 2023-08-28
Attending: INTERNAL MEDICINE
Payer: COMMERCIAL

## 2023-08-28 DIAGNOSIS — R07.89 OTHER CHEST PAIN: ICD-10-CM

## 2023-08-28 DIAGNOSIS — E78.5 DYSLIPIDEMIA: ICD-10-CM

## 2023-08-28 PROCEDURE — 4410556 CT-CARDIAC SCORING (SELF PAY ONLY)

## 2023-08-29 ENCOUNTER — APPOINTMENT (OUTPATIENT)
Dept: RADIOLOGY | Facility: MEDICAL CENTER | Age: 83
End: 2023-08-29
Attending: INTERNAL MEDICINE
Payer: MEDICARE

## 2023-08-29 ENCOUNTER — TELEPHONE (OUTPATIENT)
Dept: CARDIOLOGY | Facility: MEDICAL CENTER | Age: 83
End: 2023-08-29
Payer: MEDICARE

## 2023-08-29 NOTE — TELEPHONE ENCOUNTER
GUILLERMO    Caller: Danica Rapp    Topic/issue: Patient states she is responding to Dr. Douglas's message regarding the CT Cardiac Scoring test and to discuss the next steps. Patient was not able to reach back out to you on [facility] [place] and is requesting call back for clarification. Please advise.    Callback Number: [phone] (home)      Thank you,  Anjana SOTO

## 2023-08-30 NOTE — TELEPHONE ENCOUNTER
"Called pt [phone]   Relayed CCS results and recommendations. Pt does NOT want to start new medication at this time and would prefer to use \"dietary modifications\" as recommended by DA.     Pt asking what is her risk of plaque in artery to break off and cause other health issues such as stroke. Advised pt that message will be sent to DA. Pt states DA can respond to pt directly via [employer].  Pt verbalized understanding.      To DA   "

## 2023-09-13 ENCOUNTER — APPOINTMENT (RX ONLY)
Dept: URBAN - METROPOLITAN AREA CLINIC 4 | Facility: CLINIC | Age: 83
Setting detail: DERMATOLOGY
End: 2023-09-13

## 2023-09-13 DIAGNOSIS — D22 MELANOCYTIC NEVI: ICD-10-CM

## 2023-09-13 DIAGNOSIS — L57.0 ACTINIC KERATOSIS: ICD-10-CM | Status: INADEQUATELY CONTROLLED

## 2023-09-13 DIAGNOSIS — L50.3 DERMATOGRAPHIC URTICARIA: ICD-10-CM | Status: WELL CONTROLLED

## 2023-09-13 DIAGNOSIS — Z85.828 PERSONAL HISTORY OF OTHER MALIGNANT NEOPLASM OF SKIN: ICD-10-CM

## 2023-09-13 DIAGNOSIS — L82.0 INFLAMED SEBORRHEIC KERATOSIS: ICD-10-CM | Status: INADEQUATELY CONTROLLED

## 2023-09-13 DIAGNOSIS — L81.4 OTHER MELANIN HYPERPIGMENTATION: ICD-10-CM | Status: STABLE

## 2023-09-13 DIAGNOSIS — L82.1 OTHER SEBORRHEIC KERATOSIS: ICD-10-CM

## 2023-09-13 PROBLEM — D22.5 MELANOCYTIC NEVI OF TRUNK: Status: ACTIVE | Noted: 2023-09-13

## 2023-09-13 PROCEDURE — ? DIAGNOSIS COMMENT

## 2023-09-13 PROCEDURE — 17110 DESTRUCTION B9 LES UP TO 14: CPT

## 2023-09-13 PROCEDURE — ? COUNSELING

## 2023-09-13 PROCEDURE — ? OBSERVATION

## 2023-09-13 PROCEDURE — 17000 DESTRUCT PREMALG LESION: CPT | Mod: 59

## 2023-09-13 PROCEDURE — 17003 DESTRUCT PREMALG LES 2-14: CPT | Mod: 59

## 2023-09-13 PROCEDURE — 99213 OFFICE O/P EST LOW 20 MIN: CPT | Mod: 25

## 2023-09-13 PROCEDURE — ? ADDITIONAL NOTES

## 2023-09-13 PROCEDURE — ? OBSERVATION AND MEASURE

## 2023-09-13 PROCEDURE — ? LIQUID NITROGEN

## 2023-09-13 ASSESSMENT — LOCATION ZONE DERM
LOCATION ZONE: EAR
LOCATION ZONE: FINGER
LOCATION ZONE: TRUNK
LOCATION ZONE: LIP
LOCATION ZONE: LEG
LOCATION ZONE: ARM
LOCATION ZONE: NOSE
LOCATION ZONE: FACE
LOCATION ZONE: HAND

## 2023-09-13 ASSESSMENT — LOCATION DETAILED DESCRIPTION DERM
LOCATION DETAILED: RIGHT DORSAL THUMB METACARPOPHALANGEAL JOINT
LOCATION DETAILED: LEFT ELBOW
LOCATION DETAILED: LEFT SUPERIOR CENTRAL BUCCAL CHEEK
LOCATION DETAILED: RIGHT DISTAL POSTERIOR UPPER ARM
LOCATION DETAILED: EPIGASTRIC SKIN
LOCATION DETAILED: LEFT PROXIMAL POSTERIOR UPPER ARM
LOCATION DETAILED: RIGHT SUPERIOR MEDIAL UPPER BACK
LOCATION DETAILED: SUPERIOR THORACIC SPINE
LOCATION DETAILED: RIGHT CENTRAL ZYGOMA
LOCATION DETAILED: INFERIOR THORACIC SPINE
LOCATION DETAILED: LEFT UPPER CUTANEOUS LIP
LOCATION DETAILED: RIGHT ULNAR DORSAL HAND
LOCATION DETAILED: LEFT SUPERIOR HELIX
LOCATION DETAILED: LEFT NASAL DORSUM
LOCATION DETAILED: RIGHT MEDIAL SUPERIOR CHEST
LOCATION DETAILED: LEFT PROXIMAL RADIAL DORSAL FOREARM
LOCATION DETAILED: RIGHT PROXIMAL PRETIBIAL REGION

## 2023-09-13 ASSESSMENT — LOCATION SIMPLE DESCRIPTION DERM
LOCATION SIMPLE: RIGHT POSTERIOR UPPER ARM
LOCATION SIMPLE: LEFT LIP
LOCATION SIMPLE: LEFT ELBOW
LOCATION SIMPLE: UPPER BACK
LOCATION SIMPLE: LEFT CHEEK
LOCATION SIMPLE: RIGHT ZYGOMA
LOCATION SIMPLE: LEFT POSTERIOR UPPER ARM
LOCATION SIMPLE: LEFT EAR
LOCATION SIMPLE: RIGHT PRETIBIAL REGION
LOCATION SIMPLE: LEFT FOREARM
LOCATION SIMPLE: RIGHT HAND
LOCATION SIMPLE: NOSE
LOCATION SIMPLE: ABDOMEN
LOCATION SIMPLE: RIGHT THUMB
LOCATION SIMPLE: RIGHT UPPER BACK
LOCATION SIMPLE: CHEST

## 2023-09-13 NOTE — PROCEDURE: ADDITIONAL NOTES
Additional Notes: Controlled with antihistamines when itching recurs
Detail Level: Simple
Render Risk Assessment In Note?: no

## 2023-09-13 NOTE — PROCEDURE: LIQUID NITROGEN
Render Note In Bullet Format When Appropriate: No
Show Applicator Variable?: Yes
Duration Of Freeze Thaw-Cycle (Seconds): 0
Post-Care Instructions: I reviewed with the patient in detail post-care instructions. Patient is to wear sunprotection, and avoid picking at any of the treated lesions. Pt may apply Vaseline to crusted or scabbing areas.
Detail Level: Detailed
Consent: The patient's consent was obtained including but not limited to risks of crusting, scabbing, blistering, scarring, darker or lighter pigmentary change, recurrence, incomplete removal and infection.
Spray Paint Text: The liquid nitrogen was applied to the skin utilizing a spray paint frosting technique.
Medical Necessity Information: It is in your best interest to select a reason for this procedure from the list below. All of these items fulfill various CMS LCD requirements except the new and changing color options.
Medical Necessity Clause: This procedure was medically necessary because the lesions that were treated were:

## 2023-09-13 NOTE — PROCEDURE: DIAGNOSIS COMMENT
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
Detail Level: Detailed
Render Risk Assessment In Note?: no
Comment: Mohs scar, hx of BCC Nodular type. Treated by Dr. Hampton. [id number]
Comment: Slightly atypical\\n\\nSee photos 11/4/2020

## 2023-09-14 ENCOUNTER — HOSPITAL ENCOUNTER (OUTPATIENT)
Dept: RADIOLOGY | Facility: MEDICAL CENTER | Age: 83
End: 2023-09-14
Attending: PHYSICIAN ASSISTANT
Payer: MEDICARE

## 2023-09-14 DIAGNOSIS — R06.2 WHEEZING: ICD-10-CM

## 2023-09-14 PROCEDURE — 71046 X-RAY EXAM CHEST 2 VIEWS: CPT

## 2023-10-19 ENCOUNTER — HOSPITAL ENCOUNTER (OUTPATIENT)
Dept: LAB | Facility: MEDICAL CENTER | Age: 83
End: 2023-10-19
Attending: PHYSICIAN ASSISTANT
Payer: MEDICARE

## 2023-10-19 LAB
ALBUMIN SERPL BCP-MCNC: 3.9 G/DL (ref 3.2–4.9)
ALP SERPL-CCNC: 69 U/L (ref 30–99)
ALT SERPL-CCNC: 18 U/L (ref 2–50)
AST SERPL-CCNC: 23 U/L (ref 12–45)
BASOPHILS # BLD AUTO: 0.8 % (ref 0–1.8)
BASOPHILS # BLD: 0.04 K/UL (ref 0–0.12)
BILIRUB CONJ SERPL-MCNC: <0.2 MG/DL (ref 0.1–0.5)
BILIRUB INDIRECT SERPL-MCNC: NORMAL MG/DL (ref 0–1)
BILIRUB SERPL-MCNC: 0.3 MG/DL (ref 0.1–1.5)
CHOLEST SERPL-MCNC: 203 MG/DL (ref 100–199)
EOSINOPHIL # BLD AUTO: 0.35 K/UL (ref 0–0.51)
EOSINOPHIL NFR BLD: 6.8 % (ref 0–6.9)
ERYTHROCYTE [DISTWIDTH] IN BLOOD BY AUTOMATED COUNT: 49.1 FL (ref 35.9–50)
FASTING STATUS PATIENT QL REPORTED: NORMAL
HCT VFR BLD AUTO: 39.4 % (ref 37–47)
HDLC SERPL-MCNC: 51 MG/DL
HGB BLD-MCNC: 12.8 G/DL (ref 12–16)
IMM GRANULOCYTES # BLD AUTO: 0.01 K/UL (ref 0–0.11)
IMM GRANULOCYTES NFR BLD AUTO: 0.2 % (ref 0–0.9)
LDLC SERPL CALC-MCNC: 135 MG/DL
LYMPHOCYTES # BLD AUTO: 1.27 K/UL (ref 1–4.8)
LYMPHOCYTES NFR BLD: 24.8 % (ref 22–41)
MCH RBC QN AUTO: 28.3 PG (ref 27–33)
MCHC RBC AUTO-ENTMCNC: 32.5 G/DL (ref 32.2–35.5)
MCV RBC AUTO: 87 FL (ref 81.4–97.8)
MONOCYTES # BLD AUTO: 0.61 K/UL (ref 0–0.85)
MONOCYTES NFR BLD AUTO: 11.9 % (ref 0–13.4)
NEUTROPHILS # BLD AUTO: 2.84 K/UL (ref 1.82–7.42)
NEUTROPHILS NFR BLD: 55.5 % (ref 44–72)
NRBC # BLD AUTO: 0 K/UL
NRBC BLD-RTO: 0 /100 WBC (ref 0–0.2)
PLATELET # BLD AUTO: 415 K/UL (ref 164–446)
PMV BLD AUTO: 10.6 FL (ref 9–12.9)
PROT SERPL-MCNC: 7.4 G/DL (ref 6–8.2)
RBC # BLD AUTO: 4.53 M/UL (ref 4.2–5.4)
TRIGL SERPL-MCNC: 86 MG/DL (ref 0–149)
TSH SERPL DL<=0.005 MIU/L-ACNC: 1.21 UIU/ML (ref 0.38–5.33)
WBC # BLD AUTO: 5.1 K/UL (ref 4.8–10.8)

## 2023-10-19 PROCEDURE — 80061 LIPID PANEL: CPT

## 2023-10-19 PROCEDURE — 84443 ASSAY THYROID STIM HORMONE: CPT

## 2023-10-19 PROCEDURE — 36415 COLL VENOUS BLD VENIPUNCTURE: CPT

## 2023-10-19 PROCEDURE — 80076 HEPATIC FUNCTION PANEL: CPT

## 2023-10-19 PROCEDURE — 85025 COMPLETE CBC W/AUTO DIFF WBC: CPT

## 2023-10-26 ENCOUNTER — APPOINTMENT (RX ONLY)
Dept: URBAN - METROPOLITAN AREA CLINIC 36 | Facility: CLINIC | Age: 83
Setting detail: DERMATOLOGY
End: 2023-10-26

## 2023-10-26 DIAGNOSIS — Z41.9 ENCOUNTER FOR PROCEDURE FOR PURPOSES OTHER THAN REMEDYING HEALTH STATE, UNSPECIFIED: ICD-10-CM

## 2023-10-26 PROCEDURE — ? SCITON BBL

## 2023-10-26 ASSESSMENT — LOCATION SIMPLE DESCRIPTION DERM
LOCATION SIMPLE: RIGHT LIP
LOCATION SIMPLE: UPPER LIP
LOCATION SIMPLE: INFERIOR FOREHEAD
LOCATION SIMPLE: RIGHT FOREHEAD
LOCATION SIMPLE: RIGHT CHEEK
LOCATION SIMPLE: LEFT CHEEK
LOCATION SIMPLE: NOSE

## 2023-10-26 ASSESSMENT — LOCATION DETAILED DESCRIPTION DERM
LOCATION DETAILED: NASAL ROOT
LOCATION DETAILED: RIGHT CENTRAL MALAR CHEEK
LOCATION DETAILED: INFERIOR MID FOREHEAD
LOCATION DETAILED: RIGHT FOREHEAD
LOCATION DETAILED: RIGHT LOWER CUTANEOUS LIP
LOCATION DETAILED: NASAL DORSUM
LOCATION DETAILED: LEFT INFERIOR CENTRAL MALAR CHEEK
LOCATION DETAILED: PHILTRUM

## 2023-10-26 ASSESSMENT — LOCATION ZONE DERM
LOCATION ZONE: FACE
LOCATION ZONE: NOSE
LOCATION ZONE: LIP

## 2023-10-26 NOTE — PROCEDURE: SCITON BBL
Pulse Duration: 20
Pulse Duration Units: milliseconds
Spot Size: Finesse Adapter Size: 15 x 15 mm square
Cooling ?: Yes
Passes: 1
Cooling (In C): 15
Fluence (J/Cm2): 8
Post-Care Instructions: I reviewed with the patient in detail post-care instructions. Patient should stay away from the sun and wear sun protection until treated areas are fully healed.
Price (Use Numbers Only, No Special Characters Or $): 263.00
Treatment Number: 5
Cooling (In C): 25
Consent: Written consent obtained, risks reviewed including but not limited to crusting, scabbing, blistering, scarring, darker or lighter pigmentary change, bruising, and/or incomplete response.
Hide Repetition Rate?: No
Anesthesia Volume In Cc: 0
Fluence (J/Cm2): 12
Pulse Duration: 10
Preprocedure Text: The treatment areas were thoroughly cleaned. Any exposed at risk hair that was not to be treated was covered in white paper tape. Clear ultrasound gel was applied to the treatment area. The area was treated with no immediate stacking of pulses.
Detail Level: Zone
Post Procedure Text: The patient tolerated the procedure well. Ice-chilled washclothes were applied to the treatment area for comfort. Post care was reviewed with the patient.

## 2023-10-30 NOTE — PROGRESS NOTES
Chief Complaint   Patient presents with    Hypertension    Chest Pain          Subjective:   Danica Rapp is a 82 y.o. female who presents today for follow-up.    Patient of Dr. Douglas.  Current medical problems include hypertension.  Last office visit in August, she described dyspnea with exertion and was referred for a nuclear medicine stress test (not yet completed).  She also had a coronary calcium CT with this score of 93.  NT proBNP 204.  For this she was started on Jardiance 10 mg.    Now able to exercise, walking up to 10K per day usually. Rests after going uphill but otherwise not hindered. Still gets wheezing once in a while.   Climbs hills when walking.     Past Medical History:   Diagnosis Date    Chickenpox     Essential hypertension, benign 6/5/2012    GERD (gastroesophageal reflux disease)     Malay measles     Mumps     Nasal drainage     Nonspecific abnormal electrocardiogram (ECG) (EKG) 6/5/2012    Other chest pain 6/5/2012         Family History   Problem Relation Age of Onset    Lung Disease Mother     Lung Disease Father     Cancer Father          Social History     Tobacco Use    Smoking status: Never    Smokeless tobacco: Never   Vaping Use    Vaping Use: Never used   Substance Use Topics    Alcohol use: Yes     Alcohol/week: 1.2 - 1.8 oz     Types: 2 Glasses of wine per week     Comment: occ    Drug use: No         Allergies   Allergen Reactions    Macrodantin [Nitrofurantoin Macrocrystal] Itching and Swelling    Amlodipine Swelling    Augmentin Itching     Headaches    Z-Pako [Azithromycin] Itching and Nausea         Current Outpatient Medications   Medication Sig    D-MANNOSE PO Take  by mouth.    valsartan-hydrochlorothiazide (DIOVAN-HCT) 320-12.5 MG per tablet TAKE 1 TABLET BY MOUTH EVERY DAY    carvedilol (COREG) 25 MG Tab Take 1 Tablet by mouth 2 times a day with meals.    estradiol (VIVELLE DOT) 0.1 MG/24HR PATCH BIWEEKLY APPLY 1 PATCH TOPICALLY TO THE SKIN 2 TIMES EVERY WEEK     "potassium chloride (KLOR-CON) 8 MEQ tablet TAKE 1 TABLET BY MOUTH EVERY DAY (Patient taking differently: Take 24 mEq by mouth every day.)    vitamin D (CHOLECALCIFEROL) 1000 UNIT Tab Take 1,000 Units by mouth every day. 2000mg    Probiotic Product (PROBIOTIC DAILY PO) Take 1 Each by mouth every day.    Calcium Carbonate-Vitamin D (CALCIUM + D PO) Take 500 mg by mouth every day.    Multiple Vitamin (MULTI-VITAMIN DAILY PO) Take  by mouth every day.    omeprazole (PRILOSEC) 20 MG CPDR Take 20 mg by mouth every day.           Review of Systems   Constitutional:  Negative for malaise/fatigue.   Respiratory:  Positive for shortness of breath and wheezing. Negative for cough.    Cardiovascular:  Negative for chest pain, palpitations, orthopnea, leg swelling and PND.   Gastrointestinal:  Negative for nausea.   Neurological:  Negative for dizziness and loss of consciousness.          Objective:   /70 (BP Location: Left arm, Patient Position: Sitting, BP Cuff Size: Adult)   Pulse 80   Resp 14   Ht 1.575 m (5' 2\")   Wt 72.1 kg (159 lb)   SpO2 95%  Body mass index is 29.08 kg/m².         Physical Exam  Vitals reviewed.   HENT:      Head: Normocephalic and atraumatic.   Cardiovascular:      Rate and Rhythm: Normal rate and regular rhythm.      Heart sounds: No murmur heard.  Pulmonary:      Effort: Pulmonary effort is normal. No respiratory distress.   Musculoskeletal:      Right lower leg: No edema.      Left lower leg: No edema.   Skin:     General: Skin is warm and dry.   Neurological:      General: No focal deficit present.      Mental Status: She is alert.      Gait: Gait normal.   Psychiatric:         Judgment: Judgment normal.            Assessment:     1. Dyslipidemia  Lipid Profile      2. Coronary artery calcification seen on CAT scan        3. BASSETT (dyspnea on exertion)        4. Essential hypertension             Medical Decision Making:  Today's Assessment / Plan:   Dyspnea with exertion  - overall " improved with conditioning. Was associated with wheezing so possibly env allergies causing symptoms. Seeing as symptoms improving will hold off on stress testing for now.   - mildly elevated NTprobnp which we discussed, again with symptoms improving ok to not start SGLT2i. Cont the hctz for now, consider adding spironolactone for another agent if necessary     Hypertension, controlled   Blood pressure slightly elevated in clinic today. Weight loss, exercise.  If average blood pressure is greater than 140/80, recommend initiating additional antihypertensive medication.  For now, continue Coreg 25 mg twice daily and valsartan-HCTZ 320-12.5 mg daily.     Coronary artery calcification   We reviewed lipid panel which shows LDL above goal.  Hesitant to start statin medication. Calcium score of 93 at age 82 which is below 50th percentile. Low-intermediate ASCVD Risk. We discussed initiating statin medication which she prefers lifestyle changes. Check lipids in 6mo, if no improvement we agreed on crestor 10mg M, F.     Return in about 6 months (around 5/1/2024) for establish with Dr Ahuja in June.

## 2023-11-01 ENCOUNTER — OFFICE VISIT (OUTPATIENT)
Dept: CARDIOLOGY | Facility: MEDICAL CENTER | Age: 83
End: 2023-11-01
Attending: PHYSICIAN ASSISTANT
Payer: MEDICARE

## 2023-11-01 VITALS
BODY MASS INDEX: 29.26 KG/M2 | RESPIRATION RATE: 14 BRPM | DIASTOLIC BLOOD PRESSURE: 70 MMHG | WEIGHT: 159 LBS | SYSTOLIC BLOOD PRESSURE: 138 MMHG | HEART RATE: 80 BPM | OXYGEN SATURATION: 95 % | HEIGHT: 62 IN

## 2023-11-01 DIAGNOSIS — R06.09 DOE (DYSPNEA ON EXERTION): ICD-10-CM

## 2023-11-01 DIAGNOSIS — E78.5 DYSLIPIDEMIA: ICD-10-CM

## 2023-11-01 DIAGNOSIS — I10 ESSENTIAL HYPERTENSION: ICD-10-CM

## 2023-11-01 DIAGNOSIS — I25.10 CORONARY ARTERY CALCIFICATION SEEN ON CAT SCAN: ICD-10-CM

## 2023-11-01 PROCEDURE — 3075F SYST BP GE 130 - 139MM HG: CPT | Performed by: PHYSICIAN ASSISTANT

## 2023-11-01 PROCEDURE — 99214 OFFICE O/P EST MOD 30 MIN: CPT | Performed by: PHYSICIAN ASSISTANT

## 2023-11-01 PROCEDURE — 99212 OFFICE O/P EST SF 10 MIN: CPT | Performed by: PHYSICIAN ASSISTANT

## 2023-11-01 PROCEDURE — 3078F DIAST BP <80 MM HG: CPT | Performed by: PHYSICIAN ASSISTANT

## 2023-11-01 ASSESSMENT — ENCOUNTER SYMPTOMS
NAUSEA: 0
PND: 0
ORTHOPNEA: 0
SHORTNESS OF BREATH: 1
DIZZINESS: 0
WHEEZING: 1
COUGH: 0
LOSS OF CONSCIOUSNESS: 0
PALPITATIONS: 0

## 2023-11-01 ASSESSMENT — FIBROSIS 4 INDEX: FIB4 SCORE: 1.07

## 2023-11-16 DIAGNOSIS — I10 ESSENTIAL HYPERTENSION: ICD-10-CM

## 2023-11-16 DIAGNOSIS — I10 ESSENTIAL HYPERTENSION, BENIGN: ICD-10-CM

## 2023-11-16 RX ORDER — CARVEDILOL 25 MG/1
25 TABLET ORAL 2 TIMES DAILY WITH MEALS
Qty: 180 TABLET | Refills: 3 | Status: SHIPPED | OUTPATIENT
Start: 2023-11-16

## 2023-11-16 RX ORDER — VALSARTAN AND HYDROCHLOROTHIAZIDE 320; 12.5 MG/1; MG/1
1 TABLET, FILM COATED ORAL DAILY
Qty: 90 TABLET | Refills: 2 | Status: SHIPPED | OUTPATIENT
Start: 2023-11-16

## 2024-04-18 ENCOUNTER — HOSPITAL ENCOUNTER (OUTPATIENT)
Dept: LAB | Facility: MEDICAL CENTER | Age: 84
End: 2024-04-18
Attending: PHYSICIAN ASSISTANT
Payer: MEDICARE

## 2024-04-18 LAB
ALBUMIN SERPL BCP-MCNC: 3.7 G/DL (ref 3.2–4.9)
ALBUMIN/GLOB SERPL: 1 G/DL
ALP SERPL-CCNC: 78 U/L (ref 30–99)
ALT SERPL-CCNC: 18 U/L (ref 2–50)
ANION GAP SERPL CALC-SCNC: 11 MMOL/L (ref 7–16)
AST SERPL-CCNC: 20 U/L (ref 12–45)
BILIRUB SERPL-MCNC: 0.3 MG/DL (ref 0.1–1.5)
BUN SERPL-MCNC: 11 MG/DL (ref 8–22)
CALCIUM ALBUM COR SERPL-MCNC: 9.6 MG/DL (ref 8.5–10.5)
CALCIUM SERPL-MCNC: 9.4 MG/DL (ref 8.5–10.5)
CHLORIDE SERPL-SCNC: 97 MMOL/L (ref 96–112)
CHOLEST SERPL-MCNC: 202 MG/DL (ref 100–199)
CO2 SERPL-SCNC: 28 MMOL/L (ref 20–33)
CREAT SERPL-MCNC: 0.65 MG/DL (ref 0.5–1.4)
GFR SERPLBLD CREATININE-BSD FMLA CKD-EPI: 87 ML/MIN/1.73 M 2
GLOBULIN SER CALC-MCNC: 3.7 G/DL (ref 1.9–3.5)
GLUCOSE SERPL-MCNC: 95 MG/DL (ref 65–99)
HDLC SERPL-MCNC: 51 MG/DL
LDLC SERPL CALC-MCNC: 136 MG/DL
POTASSIUM SERPL-SCNC: 3.4 MMOL/L (ref 3.6–5.5)
PROT SERPL-MCNC: 7.4 G/DL (ref 6–8.2)
SODIUM SERPL-SCNC: 136 MMOL/L (ref 135–145)
TRIGL SERPL-MCNC: 76 MG/DL (ref 0–149)

## 2024-04-18 PROCEDURE — 80053 COMPREHEN METABOLIC PANEL: CPT

## 2024-04-18 PROCEDURE — 80061 LIPID PANEL: CPT

## 2024-04-18 PROCEDURE — 36415 COLL VENOUS BLD VENIPUNCTURE: CPT

## 2024-04-23 ENCOUNTER — HOSPITAL ENCOUNTER (OUTPATIENT)
Facility: MEDICAL CENTER | Age: 84
End: 2024-04-23
Attending: STUDENT IN AN ORGANIZED HEALTH CARE EDUCATION/TRAINING PROGRAM
Payer: MEDICARE

## 2024-04-23 PROCEDURE — 87086 URINE CULTURE/COLONY COUNT: CPT

## 2024-04-23 PROCEDURE — 87077 CULTURE AEROBIC IDENTIFY: CPT

## 2024-04-24 ENCOUNTER — APPOINTMENT (RX ONLY)
Dept: URBAN - METROPOLITAN AREA CLINIC 4 | Facility: CLINIC | Age: 84
Setting detail: DERMATOLOGY
End: 2024-04-24

## 2024-04-24 ENCOUNTER — RX ONLY (OUTPATIENT)
Age: 84
Setting detail: RX ONLY
End: 2024-04-24

## 2024-04-24 DIAGNOSIS — Z85.828 PERSONAL HISTORY OF OTHER MALIGNANT NEOPLASM OF SKIN: ICD-10-CM

## 2024-04-24 DIAGNOSIS — D18.0 HEMANGIOMA: ICD-10-CM

## 2024-04-24 DIAGNOSIS — D22 MELANOCYTIC NEVI: ICD-10-CM

## 2024-04-24 DIAGNOSIS — L82.0 INFLAMED SEBORRHEIC KERATOSIS: ICD-10-CM

## 2024-04-24 DIAGNOSIS — L82.1 OTHER SEBORRHEIC KERATOSIS: ICD-10-CM

## 2024-04-24 DIAGNOSIS — L57.0 ACTINIC KERATOSIS: ICD-10-CM

## 2024-04-24 DIAGNOSIS — L81.4 OTHER MELANIN HYPERPIGMENTATION: ICD-10-CM | Status: STABLE

## 2024-04-24 PROBLEM — D22.5 MELANOCYTIC NEVI OF TRUNK: Status: ACTIVE | Noted: 2024-04-24

## 2024-04-24 PROBLEM — D18.01 HEMANGIOMA OF SKIN AND SUBCUTANEOUS TISSUE: Status: ACTIVE | Noted: 2024-04-24

## 2024-04-24 PROCEDURE — 17110 DESTRUCTION B9 LES UP TO 14: CPT

## 2024-04-24 PROCEDURE — 99213 OFFICE O/P EST LOW 20 MIN: CPT | Mod: 25

## 2024-04-24 PROCEDURE — 17003 DESTRUCT PREMALG LES 2-14: CPT | Mod: 59

## 2024-04-24 PROCEDURE — ? DIAGNOSIS COMMENT

## 2024-04-24 PROCEDURE — ? OBSERVATION AND MEASURE

## 2024-04-24 PROCEDURE — 17000 DESTRUCT PREMALG LESION: CPT | Mod: 59

## 2024-04-24 PROCEDURE — ? OBSERVATION

## 2024-04-24 PROCEDURE — ? COUNSELING

## 2024-04-24 PROCEDURE — ? LIQUID NITROGEN

## 2024-04-24 ASSESSMENT — LOCATION SIMPLE DESCRIPTION DERM
LOCATION SIMPLE: RIGHT POSTERIOR UPPER ARM
LOCATION SIMPLE: LEFT EAR
LOCATION SIMPLE: ABDOMEN
LOCATION SIMPLE: RIGHT FOREARM
LOCATION SIMPLE: RIGHT TEMPLE
LOCATION SIMPLE: RIGHT HAND
LOCATION SIMPLE: UPPER BACK
LOCATION SIMPLE: CHEST
LOCATION SIMPLE: RIGHT PRETIBIAL REGION
LOCATION SIMPLE: RIGHT UPPER BACK
LOCATION SIMPLE: LEFT FOREARM
LOCATION SIMPLE: LEFT ANTERIOR NECK

## 2024-04-24 ASSESSMENT — LOCATION ZONE DERM
LOCATION ZONE: TRUNK
LOCATION ZONE: NECK
LOCATION ZONE: FACE
LOCATION ZONE: EAR
LOCATION ZONE: ARM
LOCATION ZONE: LEG
LOCATION ZONE: HAND

## 2024-04-24 ASSESSMENT — LOCATION DETAILED DESCRIPTION DERM
LOCATION DETAILED: RIGHT PROXIMAL PRETIBIAL REGION
LOCATION DETAILED: MIDDLE STERNUM
LOCATION DETAILED: INFERIOR THORACIC SPINE
LOCATION DETAILED: EPIGASTRIC SKIN
LOCATION DETAILED: RIGHT SUPERIOR MEDIAL UPPER BACK
LOCATION DETAILED: LEFT PROXIMAL DORSAL FOREARM
LOCATION DETAILED: RIGHT CENTRAL TEMPLE
LOCATION DETAILED: RIGHT DISTAL POSTERIOR UPPER ARM
LOCATION DETAILED: RIGHT RADIAL DORSAL HAND
LOCATION DETAILED: RIGHT PROXIMAL DORSAL FOREARM
LOCATION DETAILED: LEFT SUPERIOR HELIX
LOCATION DETAILED: LEFT CLAVICULAR NECK
LOCATION DETAILED: LEFT INFERIOR LATERAL NECK

## 2024-04-24 NOTE — PROCEDURE: COUNSELING
Detail Level: Zone
Detail Level: Detailed
Detail Level: Generalized
Patient Specific Counseling (Will Not Stick From Patient To Patient): Patient referred to Olga Lidia to discuss possible laser referral.
Detail Level: Simple

## 2024-04-24 NOTE — PROCEDURE: LIQUID NITROGEN
Show Aperture Variable?: Yes
Duration Of Freeze Thaw-Cycle (Seconds): 0
Post-Care Instructions: I reviewed with the patient in detail post-care instructions. Patient is to wear sunprotection, and avoid picking at any of the treated lesions. Pt may apply Vaseline to crusted or scabbing areas.
Render Note In Bullet Format When Appropriate: No
Consent: The patient's consent was obtained including but not limited to risks of crusting, scabbing, blistering, scarring, darker or lighter pigmentary change, recurrence, incomplete removal and infection.
Detail Level: Detailed
Medical Necessity Clause: This procedure was medically necessary because the lesions that were treated were:
Spray Paint Text: The liquid nitrogen was applied to the skin utilizing a spray paint frosting technique.
Medical Necessity Information: It is in your best interest to select a reason for this procedure from the list below. All of these items fulfill various CMS LCD requirements except the new and changing color options.

## 2024-04-24 NOTE — PROCEDURE: DIAGNOSIS COMMENT
Detail Level: Detailed
Comment: Slightly atypical\\n\\nSee photos 11/4/2020
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
Render Risk Assessment In Note?: no
Comment: Mohs scar, hx of BCC Nodular type. Treated by Dr. Hampton. [id number]

## 2024-04-26 LAB
BACTERIA UR CULT: NORMAL
SIGNIFICANT IND 70042: NORMAL
SITE SITE: NORMAL
SOURCE SOURCE: NORMAL

## 2024-07-17 ENCOUNTER — HOSPITAL ENCOUNTER (OUTPATIENT)
Dept: LAB | Facility: MEDICAL CENTER | Age: 84
End: 2024-07-17
Attending: PHYSICIAN ASSISTANT
Payer: MEDICARE

## 2024-07-17 LAB — POTASSIUM SERPL-SCNC: 4.4 MMOL/L (ref 3.6–5.5)

## 2024-07-17 PROCEDURE — 36415 COLL VENOUS BLD VENIPUNCTURE: CPT

## 2024-07-17 PROCEDURE — 84132 ASSAY OF SERUM POTASSIUM: CPT

## 2024-08-10 DIAGNOSIS — I10 ESSENTIAL HYPERTENSION, BENIGN: ICD-10-CM

## 2024-08-12 RX ORDER — VALSARTAN AND HYDROCHLOROTHIAZIDE 320; 12.5 MG/1; MG/1
1 TABLET, FILM COATED ORAL DAILY
Qty: 90 TABLET | Refills: 2 | OUTPATIENT
Start: 2024-08-12

## 2024-08-12 NOTE — TELEPHONE ENCOUNTER
Is the patient due for a refill? Yes    Was the patient seen the past year? Yes    Date of last office visit: 11/01/2023    Does the patient have an upcoming appointment?  No   If yes, When?     Provider to refill:SARAH    Does the patient have long term Plus and need 100-day supply? (This applies to ALL medications) Patient does not have SCP

## 2024-10-04 ENCOUNTER — HOSPITAL ENCOUNTER (OUTPATIENT)
Facility: MEDICAL CENTER | Age: 84
End: 2024-10-04
Attending: UROLOGY
Payer: MEDICARE

## 2024-10-04 PROCEDURE — 81001 URINALYSIS AUTO W/SCOPE: CPT

## 2024-10-04 PROCEDURE — 87077 CULTURE AEROBIC IDENTIFY: CPT

## 2024-10-04 PROCEDURE — 87086 URINE CULTURE/COLONY COUNT: CPT

## 2024-10-04 PROCEDURE — 87186 SC STD MICRODIL/AGAR DIL: CPT

## 2024-10-05 LAB
APPEARANCE UR: ABNORMAL
BACTERIA #/AREA URNS HPF: ABNORMAL /HPF
BILIRUB UR QL STRIP.AUTO: ABNORMAL
COLOR UR: ABNORMAL
EPI CELLS #/AREA URNS HPF: ABNORMAL /HPF
GLUCOSE UR STRIP.AUTO-MCNC: ABNORMAL MG/DL
HYALINE CASTS #/AREA URNS LPF: ABNORMAL /LPF
KETONES UR STRIP.AUTO-MCNC: ABNORMAL MG/DL
LEUKOCYTE ESTERASE UR QL STRIP.AUTO: ABNORMAL
MICRO URNS: ABNORMAL
NITRITE UR QL STRIP.AUTO: ABNORMAL
PH UR STRIP.AUTO: ABNORMAL [PH] (ref 5–8)
PROT UR QL STRIP: ABNORMAL MG/DL
RBC # URNS HPF: ABNORMAL /HPF
RBC UR QL AUTO: ABNORMAL
SP GR UR STRIP.AUTO: ABNORMAL
UROBILINOGEN UR STRIP.AUTO-MCNC: ABNORMAL MG/DL
WBC #/AREA URNS HPF: ABNORMAL /HPF

## 2024-10-11 ENCOUNTER — TELEPHONE (OUTPATIENT)
Dept: CARDIOLOGY | Facility: MEDICAL CENTER | Age: 84
End: 2024-10-11
Payer: MEDICARE

## 2024-10-11 DIAGNOSIS — I10 ESSENTIAL HYPERTENSION, BENIGN: ICD-10-CM

## 2024-10-16 ENCOUNTER — HOSPITAL ENCOUNTER (OUTPATIENT)
Facility: MEDICAL CENTER | Age: 84
End: 2024-10-16
Attending: STUDENT IN AN ORGANIZED HEALTH CARE EDUCATION/TRAINING PROGRAM
Payer: MEDICARE

## 2024-10-16 ENCOUNTER — APPOINTMENT (RX ONLY)
Dept: URBAN - METROPOLITAN AREA CLINIC 4 | Facility: CLINIC | Age: 84
Setting detail: DERMATOLOGY
End: 2024-10-16

## 2024-10-16 DIAGNOSIS — L82.1 OTHER SEBORRHEIC KERATOSIS: ICD-10-CM

## 2024-10-16 DIAGNOSIS — L57.0 ACTINIC KERATOSIS: ICD-10-CM

## 2024-10-16 DIAGNOSIS — D18.0 HEMANGIOMA: ICD-10-CM | Status: STABLE

## 2024-10-16 DIAGNOSIS — Z85.828 PERSONAL HISTORY OF OTHER MALIGNANT NEOPLASM OF SKIN: ICD-10-CM

## 2024-10-16 DIAGNOSIS — D22 MELANOCYTIC NEVI: ICD-10-CM | Status: STABLE

## 2024-10-16 DIAGNOSIS — L81.4 OTHER MELANIN HYPERPIGMENTATION: ICD-10-CM | Status: STABLE

## 2024-10-16 PROBLEM — D23.39 OTHER BENIGN NEOPLASM OF SKIN OF OTHER PARTS OF FACE: Status: ACTIVE | Noted: 2024-10-16

## 2024-10-16 PROBLEM — D22.5 MELANOCYTIC NEVI OF TRUNK: Status: ACTIVE | Noted: 2024-10-16

## 2024-10-16 PROBLEM — D18.01 HEMANGIOMA OF SKIN AND SUBCUTANEOUS TISSUE: Status: ACTIVE | Noted: 2024-10-16

## 2024-10-16 PROCEDURE — 87086 URINE CULTURE/COLONY COUNT: CPT

## 2024-10-16 PROCEDURE — 87510 GARDNER VAG DNA DIR PROBE: CPT

## 2024-10-16 PROCEDURE — 87480 CANDIDA DNA DIR PROBE: CPT

## 2024-10-16 PROCEDURE — 99213 OFFICE O/P EST LOW 20 MIN: CPT | Mod: 25

## 2024-10-16 PROCEDURE — 17003 DESTRUCT PREMALG LES 2-14: CPT

## 2024-10-16 PROCEDURE — ? DIAGNOSIS COMMENT

## 2024-10-16 PROCEDURE — ? COUNSELING

## 2024-10-16 PROCEDURE — 17000 DESTRUCT PREMALG LESION: CPT

## 2024-10-16 PROCEDURE — 87077 CULTURE AEROBIC IDENTIFY: CPT

## 2024-10-16 PROCEDURE — 87186 SC STD MICRODIL/AGAR DIL: CPT

## 2024-10-16 PROCEDURE — ? LIQUID NITROGEN

## 2024-10-16 PROCEDURE — ? OBSERVATION AND MEASURE

## 2024-10-16 PROCEDURE — 87660 TRICHOMONAS VAGIN DIR PROBE: CPT

## 2024-10-16 RX ORDER — VALSARTAN AND HYDROCHLOROTHIAZIDE 320; 12.5 MG/1; MG/1
1 TABLET, FILM COATED ORAL DAILY
Qty: 90 TABLET | Refills: 0 | Status: SHIPPED | OUTPATIENT
Start: 2024-10-16

## 2024-10-16 ASSESSMENT — LOCATION SIMPLE DESCRIPTION DERM
LOCATION SIMPLE: LEFT TEMPLE
LOCATION SIMPLE: CHEST
LOCATION SIMPLE: RIGHT PRETIBIAL REGION
LOCATION SIMPLE: LEFT FOREARM
LOCATION SIMPLE: LEFT LIP
LOCATION SIMPLE: RIGHT UPPER BACK
LOCATION SIMPLE: RIGHT POSTERIOR UPPER ARM
LOCATION SIMPLE: RIGHT UPPER ARM
LOCATION SIMPLE: UPPER BACK
LOCATION SIMPLE: RIGHT FOREARM
LOCATION SIMPLE: ABDOMEN
LOCATION SIMPLE: RIGHT LIP
LOCATION SIMPLE: LEFT EAR
LOCATION SIMPLE: RIGHT EAR

## 2024-10-16 ASSESSMENT — LOCATION DETAILED DESCRIPTION DERM
LOCATION DETAILED: MIDDLE STERNUM
LOCATION DETAILED: RIGHT SUPERIOR MEDIAL UPPER BACK
LOCATION DETAILED: INFERIOR THORACIC SPINE
LOCATION DETAILED: RIGHT PROXIMAL PRETIBIAL REGION
LOCATION DETAILED: RIGHT INFERIOR MEDIAL UPPER BACK
LOCATION DETAILED: RIGHT LOWER CUTANEOUS LIP
LOCATION DETAILED: RIGHT DISTAL POSTERIOR UPPER ARM
LOCATION DETAILED: RIGHT CRUS OF HELIX
LOCATION DETAILED: LEFT UPPER CUTANEOUS LIP
LOCATION DETAILED: RIGHT DISTAL PRETIBIAL REGION
LOCATION DETAILED: EPIGASTRIC SKIN
LOCATION DETAILED: LEFT SUPERIOR HELIX
LOCATION DETAILED: LEFT PROXIMAL DORSAL FOREARM
LOCATION DETAILED: LEFT LATERAL TEMPLE
LOCATION DETAILED: RIGHT PROXIMAL DORSAL FOREARM
LOCATION DETAILED: RIGHT ANTERIOR PROXIMAL UPPER ARM

## 2024-10-16 ASSESSMENT — LOCATION ZONE DERM
LOCATION ZONE: LIP
LOCATION ZONE: EAR
LOCATION ZONE: ARM
LOCATION ZONE: FACE
LOCATION ZONE: LEG
LOCATION ZONE: TRUNK

## 2024-10-16 NOTE — PROCEDURE: DIAGNOSIS COMMENT
Detail Level: Detailed
Comment: Slightly atypical\\n\\nSee photos 11/4/2020
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
Render Risk Assessment In Note?: no
Comment: Mohs scar, hx of BCC Nodular type. Treated by Dr. Hampton. [id number]

## 2024-10-16 NOTE — PROCEDURE: COUNSELING
Detail Level: Simple
Detail Level: Zone
Detail Level: Detailed
Detail Level: Generalized
Patient Specific Counseling (Will Not Stick From Patient To Patient): Patient referred to Olga Lidia to discuss possible laser referral.

## 2024-10-17 LAB
CANDIDA DNA VAG QL PROBE+SIG AMP: POSITIVE
G VAGINALIS DNA VAG QL PROBE+SIG AMP: NEGATIVE
T VAGINALIS DNA VAG QL PROBE+SIG AMP: NEGATIVE

## 2024-10-29 ENCOUNTER — HOSPITAL ENCOUNTER (OUTPATIENT)
Facility: MEDICAL CENTER | Age: 84
End: 2024-10-29
Attending: UROLOGY
Payer: MEDICARE

## 2024-11-01 ENCOUNTER — HOSPITAL ENCOUNTER (OUTPATIENT)
Facility: MEDICAL CENTER | Age: 84
End: 2024-11-01
Payer: MEDICARE

## 2024-11-01 PROCEDURE — 87086 URINE CULTURE/COLONY COUNT: CPT

## 2024-11-01 PROCEDURE — 87186 SC STD MICRODIL/AGAR DIL: CPT

## 2024-11-01 PROCEDURE — 87077 CULTURE AEROBIC IDENTIFY: CPT

## 2024-11-06 ENCOUNTER — HOSPITAL ENCOUNTER (OUTPATIENT)
Dept: LAB | Facility: MEDICAL CENTER | Age: 84
End: 2024-11-06
Attending: PHYSICIAN ASSISTANT
Payer: MEDICARE

## 2024-11-06 LAB
BASOPHILS # BLD AUTO: 0.4 % (ref 0–1.8)
BASOPHILS # BLD: 0.02 K/UL (ref 0–0.12)
EOSINOPHIL # BLD AUTO: 0.11 K/UL (ref 0–0.51)
EOSINOPHIL NFR BLD: 2.1 % (ref 0–6.9)
ERYTHROCYTE [DISTWIDTH] IN BLOOD BY AUTOMATED COUNT: 48.1 FL (ref 35.9–50)
HCT VFR BLD AUTO: 39.5 % (ref 37–47)
HGB BLD-MCNC: 12.9 G/DL (ref 12–16)
IMM GRANULOCYTES # BLD AUTO: 0.01 K/UL (ref 0–0.11)
IMM GRANULOCYTES NFR BLD AUTO: 0.2 % (ref 0–0.9)
LYMPHOCYTES # BLD AUTO: 1.38 K/UL (ref 1–4.8)
LYMPHOCYTES NFR BLD: 26.4 % (ref 22–41)
MCH RBC QN AUTO: 28.4 PG (ref 27–33)
MCHC RBC AUTO-ENTMCNC: 32.7 G/DL (ref 32.2–35.5)
MCV RBC AUTO: 86.8 FL (ref 81.4–97.8)
MONOCYTES # BLD AUTO: 0.48 K/UL (ref 0–0.85)
MONOCYTES NFR BLD AUTO: 9.2 % (ref 0–13.4)
NEUTROPHILS # BLD AUTO: 3.23 K/UL (ref 1.82–7.42)
NEUTROPHILS NFR BLD: 61.7 % (ref 44–72)
NRBC # BLD AUTO: 0 K/UL
NRBC BLD-RTO: 0 /100 WBC (ref 0–0.2)
PLATELET # BLD AUTO: 405 K/UL (ref 164–446)
PMV BLD AUTO: 11 FL (ref 9–12.9)
RBC # BLD AUTO: 4.55 M/UL (ref 4.2–5.4)
WBC # BLD AUTO: 5.2 K/UL (ref 4.8–10.8)

## 2024-11-06 PROCEDURE — 36415 COLL VENOUS BLD VENIPUNCTURE: CPT

## 2024-11-06 PROCEDURE — 84443 ASSAY THYROID STIM HORMONE: CPT

## 2024-11-06 PROCEDURE — 85025 COMPLETE CBC W/AUTO DIFF WBC: CPT

## 2024-11-06 PROCEDURE — 80061 LIPID PANEL: CPT

## 2024-11-06 PROCEDURE — 80053 COMPREHEN METABOLIC PANEL: CPT

## 2024-11-07 DIAGNOSIS — I10 ESSENTIAL HYPERTENSION: ICD-10-CM

## 2024-11-07 LAB
ALBUMIN SERPL BCP-MCNC: 3.6 G/DL (ref 3.2–4.9)
ALBUMIN/GLOB SERPL: 0.9 G/DL
ALP SERPL-CCNC: 72 U/L (ref 30–99)
ALT SERPL-CCNC: 19 U/L (ref 2–50)
ANION GAP SERPL CALC-SCNC: 14 MMOL/L (ref 7–16)
AST SERPL-CCNC: 25 U/L (ref 12–45)
BILIRUB SERPL-MCNC: 0.4 MG/DL (ref 0.1–1.5)
BUN SERPL-MCNC: 15 MG/DL (ref 8–22)
CALCIUM ALBUM COR SERPL-MCNC: 9.9 MG/DL (ref 8.5–10.5)
CALCIUM SERPL-MCNC: 9.6 MG/DL (ref 8.5–10.5)
CHLORIDE SERPL-SCNC: 94 MMOL/L (ref 96–112)
CHOLEST SERPL-MCNC: 188 MG/DL (ref 100–199)
CO2 SERPL-SCNC: 25 MMOL/L (ref 20–33)
CREAT SERPL-MCNC: 0.64 MG/DL (ref 0.5–1.4)
GFR SERPLBLD CREATININE-BSD FMLA CKD-EPI: 87 ML/MIN/1.73 M 2
GLOBULIN SER CALC-MCNC: 3.8 G/DL (ref 1.9–3.5)
GLUCOSE SERPL-MCNC: 71 MG/DL (ref 65–99)
HDLC SERPL-MCNC: 49 MG/DL
LDLC SERPL CALC-MCNC: 120 MG/DL
POTASSIUM SERPL-SCNC: 3.5 MMOL/L (ref 3.6–5.5)
PROT SERPL-MCNC: 7.4 G/DL (ref 6–8.2)
SODIUM SERPL-SCNC: 133 MMOL/L (ref 135–145)
TRIGL SERPL-MCNC: 95 MG/DL (ref 0–149)
TSH SERPL-ACNC: 1.4 UIU/ML (ref 0.35–5.5)

## 2024-11-07 NOTE — TELEPHONE ENCOUNTER
Is the patient due for a refill? Yes    Was the patient seen the past year? No    Date of last office visit: 11/1/2023    Does the patient have an upcoming appointment?  Yes   If yes, When? 11/26/2024    Provider to refill:SARAH    Does the patient have assisted Plus and need 100-day supply? (This applies to ALL medications) Patient does not have SCP

## 2024-11-11 RX ORDER — CARVEDILOL 25 MG/1
25 TABLET ORAL 2 TIMES DAILY WITH MEALS
Qty: 180 TABLET | Refills: 0 | Status: SHIPPED | OUTPATIENT
Start: 2024-11-11 | End: 2024-11-26 | Stop reason: SDUPTHER

## 2024-11-12 ENCOUNTER — APPOINTMENT (OUTPATIENT)
Dept: RADIOLOGY | Facility: MEDICAL CENTER | Age: 84
End: 2024-11-12
Attending: STUDENT IN AN ORGANIZED HEALTH CARE EDUCATION/TRAINING PROGRAM
Payer: MEDICARE

## 2024-11-13 ENCOUNTER — HOSPITAL ENCOUNTER (OUTPATIENT)
Dept: RADIOLOGY | Facility: MEDICAL CENTER | Age: 84
End: 2024-11-13
Attending: STUDENT IN AN ORGANIZED HEALTH CARE EDUCATION/TRAINING PROGRAM
Payer: MEDICARE

## 2024-11-13 DIAGNOSIS — N39.0 URINARY TRACT INFECTION WITHOUT COMPLICATION: ICD-10-CM

## 2024-11-13 PROCEDURE — 74176 CT ABD & PELVIS W/O CONTRAST: CPT

## 2024-11-20 ENCOUNTER — APPOINTMENT (RX ONLY)
Dept: URBAN - METROPOLITAN AREA CLINIC 4 | Facility: CLINIC | Age: 84
Setting detail: DERMATOLOGY
End: 2024-11-20

## 2024-11-20 DIAGNOSIS — L50.1 IDIOPATHIC URTICARIA: ICD-10-CM

## 2024-11-20 PROCEDURE — ? PRESCRIPTION

## 2024-11-20 PROCEDURE — ? COUNSELING

## 2024-11-20 PROCEDURE — ? TREATMENT REGIMEN

## 2024-11-20 PROCEDURE — ? DIAGNOSIS COMMENT

## 2024-11-20 PROCEDURE — ? ADDITIONAL NOTES

## 2024-11-20 PROCEDURE — 99213 OFFICE O/P EST LOW 20 MIN: CPT

## 2024-11-20 RX ORDER — TRIAMCINOLONE ACETONIDE 1 MG/G
CREAM TOPICAL
Qty: 80 | Refills: 0 | Status: ERX | COMMUNITY
Start: 2024-11-20

## 2024-11-20 RX ADMIN — TRIAMCINOLONE ACETONIDE: 1 CREAM TOPICAL at 00:00

## 2024-11-20 ASSESSMENT — LOCATION DETAILED DESCRIPTION DERM: LOCATION DETAILED: RIGHT MEDIAL UPPER BACK

## 2024-11-20 ASSESSMENT — LOCATION ZONE DERM: LOCATION ZONE: TRUNK

## 2024-11-20 ASSESSMENT — LOCATION SIMPLE DESCRIPTION DERM: LOCATION SIMPLE: RIGHT UPPER BACK

## 2024-11-20 NOTE — PROCEDURE: ADDITIONAL NOTES
Render Risk Assessment In Note?: no
Additional Notes: Pt developed rash one week into her second course of Cefnidir for UTI.  Itchy red patches that move and last less than 24 hours.  Occur on face, neck and trunk.  Suspect allergy to Cefnidir.
Detail Level: Detailed

## 2024-11-20 NOTE — PROCEDURE: TREATMENT REGIMEN
Detail Level: Simple
Plan: Advised pt to avoid taking Cefdinir in the future. Advised to see an allergist for further evaluation of allergy. Advised pt she may take both medications at the same time.
Continue Regimen: - otc Benadryl histamine
Initiate Treatment: - Recommended Xyzal in place of Claritin \\n- triamcinolone bid on areas beside face \\n- hydrocortisone for face

## 2024-11-20 NOTE — HPI: RASH
Is This A New Presentation, Or A Follow-Up?: Rash
Additional History: Pt reports that the rash started last Thursday November 14th. Pt reports she has had a reoccurring UTI and has been on plenty of antibiotics. Pt states that when taking the Cefdinir antibiotic she has been getting the rash. Pt states that the rash  happens throughout her body. Pt states that the rash is red, swollen, and itchy. Pt states that her rash comes and goes and she is controlling it with otc antihistamines.

## 2024-11-21 ENCOUNTER — HOSPITAL ENCOUNTER (OUTPATIENT)
Dept: LAB | Facility: MEDICAL CENTER | Age: 84
End: 2024-11-21
Attending: PHYSICIAN ASSISTANT
Payer: MEDICARE

## 2024-11-21 LAB
ANION GAP SERPL CALC-SCNC: 10 MMOL/L (ref 7–16)
BUN SERPL-MCNC: 12 MG/DL (ref 8–22)
CALCIUM SERPL-MCNC: 8.9 MG/DL (ref 8.5–10.5)
CHLORIDE SERPL-SCNC: 93 MMOL/L (ref 96–112)
CO2 SERPL-SCNC: 26 MMOL/L (ref 20–33)
CREAT SERPL-MCNC: 0.63 MG/DL (ref 0.5–1.4)
GFR SERPLBLD CREATININE-BSD FMLA CKD-EPI: 87 ML/MIN/1.73 M 2
GLUCOSE SERPL-MCNC: 83 MG/DL (ref 65–99)
POTASSIUM SERPL-SCNC: 3.8 MMOL/L (ref 3.6–5.5)
SODIUM SERPL-SCNC: 129 MMOL/L (ref 135–145)

## 2024-11-21 PROCEDURE — 80048 BASIC METABOLIC PNL TOTAL CA: CPT

## 2024-11-21 PROCEDURE — 36415 COLL VENOUS BLD VENIPUNCTURE: CPT

## 2024-11-26 ENCOUNTER — OFFICE VISIT (OUTPATIENT)
Dept: CARDIOLOGY | Facility: MEDICAL CENTER | Age: 84
End: 2024-11-26
Attending: INTERNAL MEDICINE
Payer: MEDICARE

## 2024-11-26 VITALS
HEART RATE: 83 BPM | WEIGHT: 158 LBS | OXYGEN SATURATION: 96 % | SYSTOLIC BLOOD PRESSURE: 146 MMHG | DIASTOLIC BLOOD PRESSURE: 76 MMHG | HEIGHT: 62 IN | RESPIRATION RATE: 12 BRPM | BODY MASS INDEX: 29.08 KG/M2

## 2024-11-26 DIAGNOSIS — E87.1 HYPONATREMIA: ICD-10-CM

## 2024-11-26 DIAGNOSIS — I10 ESSENTIAL HYPERTENSION, BENIGN: ICD-10-CM

## 2024-11-26 DIAGNOSIS — I10 ESSENTIAL HYPERTENSION: ICD-10-CM

## 2024-11-26 DIAGNOSIS — E87.6 HYPOKALEMIA: ICD-10-CM

## 2024-11-26 LAB — EKG IMPRESSION: NORMAL

## 2024-11-26 PROCEDURE — 99213 OFFICE O/P EST LOW 20 MIN: CPT | Performed by: INTERNAL MEDICINE

## 2024-11-26 PROCEDURE — 93005 ELECTROCARDIOGRAM TRACING: CPT | Performed by: INTERNAL MEDICINE

## 2024-11-26 RX ORDER — POTASSIUM CHLORIDE 600 MG/1
24 TABLET, FILM COATED, EXTENDED RELEASE ORAL DAILY
Qty: 270 TABLET | Refills: 3 | Status: SHIPPED | OUTPATIENT
Start: 2024-11-26

## 2024-11-26 RX ORDER — VALSARTAN AND HYDROCHLOROTHIAZIDE 320; 12.5 MG/1; MG/1
1 TABLET, FILM COATED ORAL DAILY
Qty: 90 TABLET | Refills: 3 | Status: SHIPPED | OUTPATIENT
Start: 2024-11-26

## 2024-11-26 RX ORDER — PHENAZOPYRIDINE HYDROCHLORIDE 200 MG/1
200 TABLET, FILM COATED ORAL 3 TIMES DAILY PRN
COMMUNITY

## 2024-11-26 RX ORDER — CARVEDILOL 25 MG/1
25 TABLET ORAL 2 TIMES DAILY WITH MEALS
Qty: 180 TABLET | Refills: 3 | Status: SHIPPED | OUTPATIENT
Start: 2024-11-26

## 2024-11-26 ASSESSMENT — FIBROSIS 4 INDEX: FIB4 SCORE: 1.19

## 2024-11-26 NOTE — PROGRESS NOTES
Medical decision making:  -In there realm of primary prevention so statins are optional though encouraged to take a statin for vascular protection.  It is up to her and I respect her decision.  -For now I have placed atorvastatin on her intolerance list because is not appropriate to ask her to take a statin if she chooses to do so.  She is never actually taken a statin and does not have a true tolerance, this is her only way to adjudicate this issue in the Epic system.  -Regarding her low sodium and potassium, this would be related to HCTZ.  Since he is about to leave for [place] for 4 months, will give her a BMP to be drawn down there in a few weeks.  If Na remains low, we will switch her HCTZ to spironolactone 25 mg daily.  We will then send her a BMP in the mail which can have it redrawn again to watch the potassium level.  -She could be on the spectrum of primary hyperaldosteronism, spironolactone could be a good choice.  However, it is considered high risk associated with hyperkalemia so it does require blood work.  -I want her to continue to exercise and stay physically active.  If she is having any setbacks and we discussed a chemical nuclear perfusion stress test to evaluate for possibly clinical disease in the proximal LAD.  I would not want her to go on a treadmill, rather perfusion stress test.  -I have asked her to modify how she checks blood pressure at home and to take her blood pressure cuff with her to [place].  -I will see her back in about 4 months after she gets back from [place].    Problem list:  1. Essential hypertension, benign  - EKG  - potassium chloride (KLOR-CON) 8 MEQ tablet; Take 3 Tablets by mouth every day.  Dispense: 270 Tablet; Refill: 3  - valsartan-hydrochlorothiazide (DIOVAN-HCT) 320-12.5 MG per tablet; Take 1 Tablet by mouth every day.  Dispense: 90 Tablet; Refill: 3    2. Essential hypertension  - carvedilol (COREG) 25 MG Tab; Take 1 Tablet by mouth 2 times a day with  meals.  Dispense: 180 Tablet; Refill: 3    3. Hypokalemia  - potassium chloride (KLOR-CON) 8 MEQ tablet; Take 3 Tablets by mouth every day.  Dispense: 270 Tablet; Refill: 3    4. Hyponatremia  - Basic Metabolic Panel; Future    Other orders  - Loratadine (CLARITIN PO); Take  by mouth.  - diphenhydrAMINE HCl (BENADRYL ALLERGY PO); Take  by mouth.  - phenazopyridine (PYRIDIUM) 200 MG Tab; Take 200 mg by mouth 3 times a day as needed.         Chief Complaint:   Chief Complaint   Patient presents with    Hypertension     DX: Essential hypertension, benign        History of Present Illness:  Danica Rapp is a 84 y.o. female who returns for long-term management of coronary artery calcification, hypertension, BASSETT, HLP.    Previously followed by Dr. oDuglas, more recently seen by SHADY Ladd.    CAC 93.  We discussed primary prevention statin at the age of 84.  I do not think it is necessary for primary prevention.  However, her calcification is in the proximal LAD which can be considered high risk.  Information given for her to make her best decision about statins.    Minimal hyperlipidemia, .    Hypertension, I have asked her to modify how she checks at home and I have asked her to take a cuff with her to [place].    BASSETT, this seems to improve with exercise.    Struggling with recurrent UTIs and discomfort.    No family history of premature coronary artery disease.  No prior smoking history.  No history of diabetes.  No history of autoimmune disease such as lupus or rheumatoid arthritis.  No history of chest radiation or cardiotoxic chemotherapy.  No chronic kidney disease.  No ETOH overuse.  No caffeine overuse.  No recreation substance use.  No hx asthma.    Tries to walk for exercise which helps with shortness of breath and weight management.  More recently limited by UTI problems.  Not limited by chest pain, pressure or tightness with activity.  No significant dyspnea on exertion, orthopnea or lower  extremity swelling.  No significant palpitations, lightheadedness, or presyncope/syncope.  No symptoms of leg claudication.  No stroke/TIA like symptoms.    Lives in [place].  Stays in [place] for 4 months of the year, typically at a rental property.  Point of contact is her daughter Maite Briones.    Wt Readings from Last 5 Encounters:   11/26/24 71.7 kg (158 lb)   11/01/23 72.1 kg (159 lb)   08/17/23 72.6 kg (160 lb)   06/27/23 70.3 kg (155 lb)   10/07/22 69.4 kg (153 lb)       DATA REVIEWED by me:  ECG (my personal interpretation) 11/26/2024 sinus, 82, rightward axis  3-7-22 sinus 70 low voltage throughout    Echo  5/26/2022  Normal left ventricular size and function, no wall motion abnormality.  9-12-19  No prior study is available for comparison.  Mild asymmetric septal hypertrophy.  Normal left ventricular systolic function.  Left ventricular ejection fraction is visually estimated to be 70%.  Normal right ventricular size and systolic function.  Normal left atrial size.  Mild MAC. No mitral stenosis.  Trace mitral regurgitation.  Structurally normal aortic valve without significant stenosis or   regurgitation.  Normal pericardium without effusion.  Normal aortic root for body surface area.       Most recent labs:       Lab Results   Component Value Date/Time    WBC 5.2 11/06/2024 11:09 AM    HEMOGLOBIN 12.9 11/06/2024 11:09 AM    HEMATOCRIT 39.5 11/06/2024 11:09 AM    MCV 86.8 11/06/2024 11:09 AM      Lab Results   Component Value Date/Time    SODIUM 129 (L) 11/21/2024 01:51 PM    POTASSIUM 3.8 11/21/2024 01:51 PM    CHLORIDE 93 (L) 11/21/2024 01:51 PM    CO2 26 11/21/2024 01:51 PM    GLUCOSE 83 11/21/2024 01:51 PM    BUN 12 11/21/2024 01:51 PM    CREATININE 0.63 11/21/2024 01:51 PM      Lab Results   Component Value Date/Time    ASTSGOT 25 11/06/2024 11:09 AM    ALTSGPT 19 11/06/2024 11:09 AM    ALBUMIN 3.6 11/06/2024 11:09 AM      Lab Results   Component Value Date/Time    CHOLSTRLTOT 188 11/06/2024 11:09 AM     " (H) 11/06/2024 11:09 AM    HDL 49 11/06/2024 11:09 AM    TRIGLYCERIDE 95 11/06/2024 11:09 AM     No results for input(s): \"NTPROBNP\", \"TROPONINT\" in the last 72 hours.      Past Medical History:   Diagnosis Date    Chickenpox     Essential hypertension, benign 6/5/2012    GERD (gastroesophageal reflux disease)     Swazi measles     Mumps     Nasal drainage     Nonspecific abnormal electrocardiogram (ECG) (EKG) 6/5/2012    Other chest pain 6/5/2012     Past Surgical History:   Procedure Laterality Date    CHOLECYSTECTOMY      HYSTERECTOMY LAPAROSCOPY       Family History   Problem Relation Age of Onset    Lung Disease Mother     Lung Disease Father     Cancer Father      Social History     Socioeconomic History    Marital status:      Spouse name: Not on file    Number of children: Not on file    Years of education: Not on file    Highest education level: Not on file   Occupational History    Not on file   Tobacco Use    Smoking status: Never    Smokeless tobacco: Never   Vaping Use    Vaping status: Never Used   Substance and Sexual Activity    Alcohol use: Yes     Alcohol/week: 1.2 - 1.8 oz     Types: 2 Glasses of wine per week     Comment: occ    Drug use: No    Sexual activity: Not on file   Other Topics Concern    Not on file   Social History Narrative    Not on file     Social Drivers of Health     Financial Resource Strain: Not on file   Food Insecurity: Not on file   Transportation Needs: Not on file   Physical Activity: Not on file   Stress: Not on file   Social Connections: Not on file   Intimate Partner Violence: Not on file   Housing Stability: Not on file     Allergies   Allergen Reactions    Macrodantin [Nitrofurantoin Macrocrystal] Itching and Swelling    Amlodipine Swelling    Atorvastatin      Not appropriate for primary prevention at 84.    Augmentin Itching     Headaches    Cefdinir Hives, Itching and Rash    Z-Pako [Azithromycin] Itching and Nausea       Current Outpatient " "Medications   Medication Sig Dispense Refill    Loratadine (CLARITIN PO) Take  by mouth.      diphenhydrAMINE HCl (BENADRYL ALLERGY PO) Take  by mouth.      phenazopyridine (PYRIDIUM) 200 MG Tab Take 200 mg by mouth 3 times a day as needed.      carvedilol (COREG) 25 MG Tab Take 1 Tablet by mouth 2 times a day with meals. 180 Tablet 3    potassium chloride (KLOR-CON) 8 MEQ tablet Take 3 Tablets by mouth every day. 270 Tablet 3    valsartan-hydrochlorothiazide (DIOVAN-HCT) 320-12.5 MG per tablet Take 1 Tablet by mouth every day. 90 Tablet 3    estradiol (VIVELLE DOT) 0.1 MG/24HR PATCH BIWEEKLY APPLY 1 PATCH TOPICALLY TO THE SKIN 2 TIMES EVERY WEEK      vitamin D (CHOLECALCIFEROL) 1000 UNIT Tab Take 1,000 Units by mouth every day. 2000mg      Probiotic Product (PROBIOTIC DAILY PO) Take 1 Each by mouth every day.      Calcium Carbonate-Vitamin D (CALCIUM + D PO) Take 500 mg by mouth every day.      Multiple Vitamin (MULTI-VITAMIN DAILY PO) Take  by mouth every day.      omeprazole (PRILOSEC) 20 MG CPDR Take 20 mg by mouth every day.         No current facility-administered medications for this visit.       ROS  All others systems reviewed and negative.    BP (!) 146/76 (BP Location: Left arm, Patient Position: Sitting, BP Cuff Size: Adult)   Pulse 83   Resp 12   Ht 1.575 m (5' 2\")   Wt 71.7 kg (158 lb)   SpO2 96%  Body mass index is 28.9 kg/m².    General: No acute distress. Well nourished.  HEENT: EOM grossly intact, no scleral icterus, no pharyngeal erythema.   Neck:  No JVD, no bruits, trachea midline  CVS: RRR. Normal S1, S2. No M/R/G. No LE edema.  2+ radial pulses, 2+ PT pulses  Resp: CTAB. No wheezing or crackles/rhonchi. Normal respiratory effort.  Abdomen: Soft, NT, no nanda hepatomegaly.  MSK/Ext: No clubbing or cyanosis.  Skin: Warm and dry, no rashes.  Neurological: CN III-XII grossly intact. No focal deficits.   Psych: A&O x 3, appropriate affect, good judgement      Return in about 5 months (around " 4/26/2025).    Written instructions given today:    -In about 4 weeks, have your basic chemistry drawn in [place].  If the potassium and sodium continue to be low, we are going to stop your hydrochlorothiazide part of your valsartan.  We will prescribe valsartan by itself with a new medication called spironolactone 25 mg daily.  We will also send you another follow-up blood test to be drawn in [place] in the mail.    -Since you are on the realm of primary prevention, statins are optional but encouraged for vascular protection.  If we do start a statin, we will start with atorvastatin 10 mg and I will ask you to take co-Q10, any brand, over-the-counter, follow the instructions.    -Keep moving.  If you are not able to regain your functional status or if you are continuously limited by discomfort in the chest or unusual shortness of breath and you cannot make strides, report back for your chemical nuclear stress test.  The purpose of the stress test is to identify clinically relevant blockage in the heart artery of 70% or more that might need to be fixed.    It is important to check blood pressure at home from time to time at home to ensure your blood pressure is in a good range because we do not check it correctly in the doctor's office.    Checking Blood Pressure:    -Be sure you have a good blood pressure cuff, spend in the $40-60, ideally a “calibrated” cuff such as Omron.  I recommend purchasing your cuff from a company with a good return policy.    -Your BP machine should be an automatic, upper arm cuff.  -Measure your arm circumference to get the correct size, probably adult Large.     -Sit with back support, legs uncrossed, feet flat on the floor.  Your arm should be propped up level with your heart but completely supported, dead weight.  -Put the cuff in place, then wait 5 minutes before pushing the button, 10 minutes would be ideal. No talking, no TV, no social media, etc. Set a timer on your phone.    -Do  not check blood pressure within 30 minutes of exercise, caffeine, or large meals.    -It is ok to check immediately if you are experiencing a symptom which may be related to high or low blood pressure.    -Write your blood pressures down, keep a log and bring this to your appointments.    -Check no more than 1 time a day on normal days, maybe 1-2 times per week, try different times of day.  Typically do not check in the morning before your medications unless this information is helpful for you.    -You are concerned your blood pressure cuff is not giving you accurate data, you can bring your cuff to at least one nursing appointment where it can be calibrated to a manual cuff.    -Goal blood pressure is at least under 130/80, ideally under 120/80.  If you think your BP is overall too high, reach out to your PCP or another provider for assistance.    Salt restriction is key for blood pressure control. Salt is killing Americans, it is in almost everything.  Salt=sodium=sea salt, guidelines say stay under 2,400 mg daily but I ask for under 3,000 mg daily if possible.  Get salt smart, start looking at labels, count it up.  Salt is hidden in everything, salad dressing, sauces, cheese, most canned food, any processed meat.     Primary prevention of heart attack and stroke:    Most people over 70 have some degree of calcified heart disease that is not clinically relevant. Unfortunately, placing a stent over heart disease that is not clinically relevant does not reduce mortality.  We cannot bypass blockages that are not 70% or more because the bypass grafts will not mature and will be lost.  Blockages in the heart artery should only have a stent or bypass if they are more than 70% blocked and causing symptoms (at which point most people have symptoms such as chest pain or unusual shortness of breath with activity that goes away with rest).  People having a sudden heart attack should have a stent placed in the blocked  artery.  People having chest pain that limits their ability to function could consider having a stent placed in the artery.  Otherwise, medication and lifestyle changes are the preferred treatments and are very powerful.    Lifestyle and medications are typically more important than stents or bypass outside of a medical emergency.    The only things to do for calcified heart disease to lower your risk of sudden heart attack (or stroke which is the same disease but a different location- in the brain):  -Keep LDL cholesterol under 100 (or under 70 if you have already had a heart attack/stroke or documented vascular disease).  -If appropriate, take a statin medication (as a vascular medication, not just a cholesterol medication) which is our most powerful weapon to stabilize the plaques and reduce inflammation making them less likely to rupture open and cause a sudden heart attack/stroke.  -Control blood pressure, under 130/80, ideally closer to 120/80.  -Control blood sugar, don't get diabetes.  -Don't smoke.  -Eat a heart healthy diet that reduces inflammation: Pritikin, Mediterranean, Vegetarian, Vegan  -Get regular exercise: 150 minutes of moderate exercise OR 75 minutes of very vigorous exercise every week.  -Keep your body mass index under 30, ideal BMI is 20-25.  -Find lorraine in life, manage stress, develop close/meaningful personal relationships (proven to help people live longer)  -Know the signs and symptoms of heart attack and stroke and when to call 9-1-1.    Signs of a heart attack: Anything very intense coming from the chest that lasts more than 15 minutes, consider calling 911.  -Pain or pressure in the chest that is intense, lasts more than 15 minutes, not explained by other known reasons such as known/similar heartburn  -It can feel like severe heart burn but associated with nausea, vomiting  -It can be vague pain that radiates to the neck, jaw, shoulders, arm/arms, wrap around your back or even cause a  "toothache  -Can be associated with unusual shortness of breath at rest or sense of impending doom  *If you think you are having a heart attack, chew up 4 baby aspirin (81 mg) and call 911.    Signs of a stroke: sudden inability to talk, smile on one side, confusion, inability to move arm/leg on one side, numbness/tingling of arm/leg on one side that is not typical. \"Think FAST.\"  F=face drooping  A=arm weakness  S=Speech difficulty  T= time to call 911 (do not give ride to someone, call ambulance to alert the hospital to start stroke protocol)    Please look into the following food lifestyles:  Mediterranean diet.  Pritikin - used at [facility], probably one of the best for anti-inflammatory  Vegan/Vegetarian diet.  DASH diet.    Resources to learn more:  -American Heart Association  -Www.Cardiosmart.org, sponsored by the American College of cardiology.    Regarding statins:  -There is a lot of misinformation about statins.  -It is incredibly rare to have a debilitating reaction affecting the muscles.  -Some people have achy muscles without damage (this occurs in 5 to 6% of the population).  -If statin therapy is right for you and you cannot tolerate 1 statin, we have many different statins to try.  -Sometimes we find a reversible cause of statin intolerance such as vitamin D deficiency or co-Q10 deficiency.  -Keep working with your provider until you find a dose and brand of statin therapy that you can take without having any side effects.  -Side effects can also include abnormal liver testing which does not lead to permanent liver damage and sometimes we need to keep patients on statins and tolerate mildly abnormal liver function testing.  -For some people, blood sugars increase slightly on statins.  1 in 500 people with pre-diabetes become diabetic 6 months faster.  -Statins do not cause Alzheimer's  -If someone makes it to 75 without a reason to be on a statin for primary prevention then it " is not necessary to start statin therapy.    Statin holiday:  Your cholesterol medication can cause muscle aches/sore joints but sometimes it can be difficult to know if it is happening.  Stop your statin therapy for 1 week, note if you feel better, resume the therapy, note if you feel worse again.  Report back to me if you think your statin is making you feel worse.      It is my pleasure to participate in the care of Ms. Rapp.  Please do not hesitate to contact me with questions or concerns.    Maite Ahuja MD, [facility]  Cardiologist, [facility]    Please note that this dictation was created using voice recognition software. I have made every reasonable attempt to correct obvious errors, but it is possible there are errors of grammar and possibly content that I did not discover before finalizing the note.

## 2024-11-26 NOTE — LETTER
[facility]   [address]   [address]  [place] [address]  Phone: [phone]  Fax: [phone]              Danica Rapp  1940    Encounter Date: 11/26/2024    Maite Ahuja M.D.          PROGRESS NOTE:        Medical decision making:  -In there realm of primary prevention so statins are optional though encouraged to take a statin for vascular protection.  It is up to her and I respect her decision.  -For now I have placed atorvastatin on her intolerance list because is not appropriate to ask her to take a statin if she chooses to do so.  She is never actually taken a statin and does not have a true tolerance, this is her only way to adjudicate this issue in the Epic system.  -Regarding her low sodium and potassium, this would be related to HCTZ.  Since he is about to leave for [place] for 4 months, will give her a BMP to be drawn down there in a few weeks.  If Na remains low, we will switch her HCTZ to spironolactone 25 mg daily.  We will then send her a BMP in the mail which can have it redrawn again to watch the potassium level.  -She could be on the spectrum of primary hyperaldosteronism, spironolactone could be a good choice.  However, it is considered high risk associated with hyperkalemia so it does require blood work.  -I want her to continue to exercise and stay physically active.  If she is having any setbacks and we discussed a chemical nuclear perfusion stress test to evaluate for possibly clinical disease in the proximal LAD.  I would not want her to go on a treadmill, rather perfusion stress test.  -I have asked her to modify how she checks blood pressure at home and to take her blood pressure cuff with her to [place].  -I will see her back in about 4 months after she gets back from [place].    Problem list:  1. Essential hypertension, benign  - EKG  - potassium chloride  (KLOR-CON) 8 MEQ tablet; Take 3 Tablets by mouth every day.  Dispense: 270 Tablet; Refill: 3  - valsartan-hydrochlorothiazide (DIOVAN-HCT) 320-12.5 MG per tablet; Take 1 Tablet by mouth every day.  Dispense: 90 Tablet; Refill: 3    2. Essential hypertension  - carvedilol (COREG) 25 MG Tab; Take 1 Tablet by mouth 2 times a day with meals.  Dispense: 180 Tablet; Refill: 3    3. Hypokalemia  - potassium chloride (KLOR-CON) 8 MEQ tablet; Take 3 Tablets by mouth every day.  Dispense: 270 Tablet; Refill: 3    4. Hyponatremia  - Basic Metabolic Panel; Future    Other orders  - Loratadine (CLARITIN PO); Take  by mouth.  - diphenhydrAMINE HCl (BENADRYL ALLERGY PO); Take  by mouth.  - phenazopyridine (PYRIDIUM) 200 MG Tab; Take 200 mg by mouth 3 times a day as needed.         Chief Complaint:   Chief Complaint   Patient presents with   • Hypertension     DX: Essential hypertension, benign        History of Present Illness:  Danica Rapp is a 84 y.o. female who returns for long-term management of coronary artery calcification, hypertension, BASSETT, HLP.    Previously followed by Dr. Douglas, more recently seen by SHADY Ladd.    CAC 93.  We discussed primary prevention statin at the age of 84.  I do not think it is necessary for primary prevention.  However, her calcification is in the proximal LAD which can be considered high risk.  Information given for her to make her best decision about statins.    Minimal hyperlipidemia, .    Hypertension, I have asked her to modify how she checks at home and I have asked her to take a cuff with her to [place].    BASSETT, this seems to improve with exercise.    Struggling with recurrent UTIs and discomfort.    No family history of premature coronary artery disease.  No prior smoking history.  No history of diabetes.  No history of autoimmune disease such as lupus or rheumatoid arthritis.  No history of chest radiation or cardiotoxic chemotherapy.  No chronic kidney  disease.  No ETOH overuse.  No caffeine overuse.  No recreation substance use.  No hx asthma.    Tries to walk for exercise which helps with shortness of breath and weight management.  More recently limited by UTI problems.  Not limited by chest pain, pressure or tightness with activity.  No significant dyspnea on exertion, orthopnea or lower extremity swelling.  No significant palpitations, lightheadedness, or presyncope/syncope.  No symptoms of leg claudication.  No stroke/TIA like symptoms.    Lives in [place].  Stays in [place] for 4 months of the year, typically at a rental property.  Point of contact is her daughter Maite Briones.    Wt Readings from Last 5 Encounters:   11/26/24 71.7 kg (158 lb)   11/01/23 72.1 kg (159 lb)   08/17/23 72.6 kg (160 lb)   06/27/23 70.3 kg (155 lb)   10/07/22 69.4 kg (153 lb)       DATA REVIEWED by me:  ECG (my personal interpretation) 11/26/2024 sinus, 82, rightward axis  3-7-22 sinus 70 low voltage throughout    Echo  5/26/2022  Normal left ventricular size and function, no wall motion abnormality.  9-12-19  No prior study is available for comparison.  Mild asymmetric septal hypertrophy.  Normal left ventricular systolic function.  Left ventricular ejection fraction is visually estimated to be 70%.  Normal right ventricular size and systolic function.  Normal left atrial size.  Mild MAC. No mitral stenosis.  Trace mitral regurgitation.  Structurally normal aortic valve without significant stenosis or   regurgitation.  Normal pericardium without effusion.  Normal aortic root for body surface area.       Most recent labs:       Lab Results   Component Value Date/Time    WBC 5.2 11/06/2024 11:09 AM    HEMOGLOBIN 12.9 11/06/2024 11:09 AM    HEMATOCRIT 39.5 11/06/2024 11:09 AM    MCV 86.8 11/06/2024 11:09 AM      Lab Results   Component Value Date/Time    SODIUM 129 (L) 11/21/2024 01:51 PM    POTASSIUM 3.8 11/21/2024 01:51 PM    CHLORIDE 93 (L) 11/21/2024 01:51 PM    CO2 26 11/21/2024  "01:51 PM    GLUCOSE 83 11/21/2024 01:51 PM    BUN 12 11/21/2024 01:51 PM    CREATININE 0.63 11/21/2024 01:51 PM      Lab Results   Component Value Date/Time    ASTSGOT 25 11/06/2024 11:09 AM    ALTSGPT 19 11/06/2024 11:09 AM    ALBUMIN 3.6 11/06/2024 11:09 AM      Lab Results   Component Value Date/Time    CHOLSTRLTOT 188 11/06/2024 11:09 AM     (H) 11/06/2024 11:09 AM    HDL 49 11/06/2024 11:09 AM    TRIGLYCERIDE 95 11/06/2024 11:09 AM     No results for input(s): \"NTPROBNP\", \"TROPONINT\" in the last 72 hours.      Past Medical History:   Diagnosis Date   • Chickenpox    • Essential hypertension, benign 6/5/2012   • GERD (gastroesophageal reflux disease)    • [place] Islander measles    • Mumps    • Nasal drainage    • Nonspecific abnormal electrocardiogram (ECG) (EKG) 6/5/2012   • Other chest pain 6/5/2012     Past Surgical History:   Procedure Laterality Date   • CHOLECYSTECTOMY     • HYSTERECTOMY LAPAROSCOPY       Family History   Problem Relation Age of Onset   • Lung Disease Mother    • Lung Disease Father    • Cancer Father      Social History     Socioeconomic History   • Marital status:      Spouse name: Not on file   • Number of children: Not on file   • Years of education: Not on file   • Highest education level: Not on file   Occupational History   • Not on file   Tobacco Use   • Smoking status: Never   • Smokeless tobacco: Never   Vaping Use   • Vaping status: Never Used   Substance and Sexual Activity   • Alcohol use: Yes     Alcohol/week: 1.2 - 1.8 oz     Types: 2 Glasses of wine per week     Comment: occ   • Drug use: No   • Sexual activity: Not on file   Other Topics Concern   • Not on file   Social History Narrative   • Not on file     Social Drivers of Health     Financial Resource Strain: Not on file   Food Insecurity: Not on file   Transportation Needs: Not on file   Physical Activity: Not on file   Stress: Not on file   Social Connections: Not on file   Intimate Partner Violence: Not on file " "  Housing Stability: Not on file     Allergies   Allergen Reactions   • Macrodantin [Nitrofurantoin Macrocrystal] Itching and Swelling   • Amlodipine Swelling   • Atorvastatin      Not appropriate for primary prevention at 84.   • Augmentin Itching     Headaches   • Cefdinir Hives, Itching and Rash   • Z-Pako [Azithromycin] Itching and Nausea       Current Outpatient Medications   Medication Sig Dispense Refill   • Loratadine (CLARITIN PO) Take  by mouth.     • diphenhydrAMINE HCl (BENADRYL ALLERGY PO) Take  by mouth.     • phenazopyridine (PYRIDIUM) 200 MG Tab Take 200 mg by mouth 3 times a day as needed.     • carvedilol (COREG) 25 MG Tab Take 1 Tablet by mouth 2 times a day with meals. 180 Tablet 3   • potassium chloride (KLOR-CON) 8 MEQ tablet Take 3 Tablets by mouth every day. 270 Tablet 3   • valsartan-hydrochlorothiazide (DIOVAN-HCT) 320-12.5 MG per tablet Take 1 Tablet by mouth every day. 90 Tablet 3   • estradiol (VIVELLE DOT) 0.1 MG/24HR PATCH BIWEEKLY APPLY 1 PATCH TOPICALLY TO THE SKIN 2 TIMES EVERY WEEK     • vitamin D (CHOLECALCIFEROL) 1000 UNIT Tab Take 1,000 Units by mouth every day. 2000mg     • Probiotic Product (PROBIOTIC DAILY PO) Take 1 Each by mouth every day.     • Calcium Carbonate-Vitamin D (CALCIUM + D PO) Take 500 mg by mouth every day.     • Multiple Vitamin (MULTI-VITAMIN DAILY PO) Take  by mouth every day.     • omeprazole (PRILOSEC) 20 MG CPDR Take 20 mg by mouth every day.         No current facility-administered medications for this visit.       ROS  All others systems reviewed and negative.    BP (!) 146/76 (BP Location: Left arm, Patient Position: Sitting, BP Cuff Size: Adult)   Pulse 83   Resp 12   Ht 1.575 m (5' 2\")   Wt 71.7 kg (158 lb)   SpO2 96%  Body mass index is 28.9 kg/m².    General: No acute distress. Well nourished.  HEENT: EOM grossly intact, no scleral icterus, no pharyngeal erythema.   Neck:  No JVD, no bruits, trachea midline  CVS: RRR. Normal S1, S2. No M/R/G. " No LE edema.  2+ radial pulses, 2+ PT pulses  Resp: CTAB. No wheezing or crackles/rhonchi. Normal respiratory effort.  Abdomen: Soft, NT, no nanda hepatomegaly.  MSK/Ext: No clubbing or cyanosis.  Skin: Warm and dry, no rashes.  Neurological: CN III-XII grossly intact. No focal deficits.   Psych: A&O x 3, appropriate affect, good judgement      Return in about 5 months (around 4/26/2025).    Written instructions given today:    -In about 4 weeks, have your basic chemistry drawn in [place].  If the potassium and sodium continue to be low, we are going to stop your hydrochlorothiazide part of your valsartan.  We will prescribe valsartan by itself with a new medication called spironolactone 25 mg daily.  We will also send you another follow-up blood test to be drawn in [place] in the mail.    -Since you are on the realm of primary prevention, statins are optional but encouraged for vascular protection.  If we do start a statin, we will start with atorvastatin 10 mg and I will ask you to take co-Q10, any brand, over-the-counter, follow the instructions.    -Keep moving.  If you are not able to regain your functional status or if you are continuously limited by discomfort in the chest or unusual shortness of breath and you cannot make strides, report back for your chemical nuclear stress test.  The purpose of the stress test is to identify clinically relevant blockage in the heart artery of 70% or more that might need to be fixed.    It is important to check blood pressure at home from time to time at home to ensure your blood pressure is in a good range because we do not check it correctly in the doctor's office.    Checking Blood Pressure:    -Be sure you have a good blood pressure cuff, spend in the $40-60, ideally a “calibrated” cuff such as Omron.  I recommend purchasing your cuff from a company with a good return policy.    -Your BP machine should be an automatic, upper arm cuff.  -Measure your arm circumference to  get the correct size, probably adult Large.     -Sit with back support, legs uncrossed, feet flat on the floor.  Your arm should be propped up level with your heart but completely supported, dead weight.  -Put the cuff in place, then wait 5 minutes before pushing the button, 10 minutes would be ideal. No talking, no TV, no social media, etc. Set a timer on your phone.    -Do not check blood pressure within 30 minutes of exercise, caffeine, or large meals.    -It is ok to check immediately if you are experiencing a symptom which may be related to high or low blood pressure.    -Write your blood pressures down, keep a log and bring this to your appointments.    -Check no more than 1 time a day on normal days, maybe 1-2 times per week, try different times of day.  Typically do not check in the morning before your medications unless this information is helpful for you.    -You are concerned your blood pressure cuff is not giving you accurate data, you can bring your cuff to at least one nursing appointment where it can be calibrated to a manual cuff.    -Goal blood pressure is at least under 130/80, ideally under 120/80.  If you think your BP is overall too high, reach out to your PCP or another provider for assistance.    Salt restriction is key for blood pressure control. Salt is killing Americans, it is in almost everything.  Salt=sodium=sea salt, guidelines say stay under 2,400 mg daily but I ask for under 3,000 mg daily if possible.  Get salt smart, start looking at labels, count it up.  Salt is hidden in everything, salad dressing, sauces, cheese, most canned food, any processed meat.     Primary prevention of heart attack and stroke:    Most people over 70 have some degree of calcified heart disease that is not clinically relevant. Unfortunately, placing a stent over heart disease that is not clinically relevant does not reduce mortality.  We cannot bypass blockages that are not 70% or more because the bypass  grafts will not mature and will be lost.  Blockages in the heart artery should only have a stent or bypass if they are more than 70% blocked and causing symptoms (at which point most people have symptoms such as chest pain or unusual shortness of breath with activity that goes away with rest).  People having a sudden heart attack should have a stent placed in the blocked artery.  People having chest pain that limits their ability to function could consider having a stent placed in the artery.  Otherwise, medication and lifestyle changes are the preferred treatments and are very powerful.    Lifestyle and medications are typically more important than stents or bypass outside of a medical emergency.    The only things to do for calcified heart disease to lower your risk of sudden heart attack (or stroke which is the same disease but a different location- in the brain):  -Keep LDL cholesterol under 100 (or under 70 if you have already had a heart attack/stroke or documented vascular disease).  -If appropriate, take a statin medication (as a vascular medication, not just a cholesterol medication) which is our most powerful weapon to stabilize the plaques and reduce inflammation making them less likely to rupture open and cause a sudden heart attack/stroke.  -Control blood pressure, under 130/80, ideally closer to 120/80.  -Control blood sugar, don't get diabetes.  -Don't smoke.  -Eat a heart healthy diet that reduces inflammation: Pritikin, Mediterranean, Vegetarian, Vegan  -Get regular exercise: 150 minutes of moderate exercise OR 75 minutes of very vigorous exercise every week.  -Keep your body mass index under 30, ideal BMI is 20-25.  -Find lorraine in life, manage stress, develop close/meaningful personal relationships (proven to help people live longer)  -Know the signs and symptoms of heart attack and stroke and when to call 9-1-1.    Signs of a heart attack: Anything very intense coming from the chest that lasts more  "than 15 minutes, consider calling 911.  -Pain or pressure in the chest that is intense, lasts more than 15 minutes, not explained by other known reasons such as known/similar heartburn  -It can feel like severe heart burn but associated with nausea, vomiting  -It can be vague pain that radiates to the neck, jaw, shoulders, arm/arms, wrap around your back or even cause a toothache  -Can be associated with unusual shortness of breath at rest or sense of impending doom  *If you think you are having a heart attack, chew up 4 baby aspirin (81 mg) and call 911.    Signs of a stroke: sudden inability to talk, smile on one side, confusion, inability to move arm/leg on one side, numbness/tingling of arm/leg on one side that is not typical. \"Think FAST.\"  F=face drooping  A=arm weakness  S=Speech difficulty  T= time to call 911 (do not give ride to someone, call ambulance to alert the hospital to start stroke protocol)    Please look into the following food lifestyles:  Mediterranean diet.  Pritikin - used at [facility], probably one of the best for anti-inflammatory  Vegan/Vegetarian diet.  DASH diet.    Resources to learn more:  -American Heart Association  -Www.Cardiosmart.org, sponsored by the American College of cardiology.    Regarding statins:  -There is a lot of misinformation about statins.  -It is incredibly rare to have a debilitating reaction affecting the muscles.  -Some people have achy muscles without damage (this occurs in 5 to 6% of the population).  -If statin therapy is right for you and you cannot tolerate 1 statin, we have many different statins to try.  -Sometimes we find a reversible cause of statin intolerance such as vitamin D deficiency or co-Q10 deficiency.  -Keep working with your provider until you find a dose and brand of statin therapy that you can take without having any side effects.  -Side effects can also include abnormal liver testing which does not lead to permanent " liver damage and sometimes we need to keep patients on statins and tolerate mildly abnormal liver function testing.  -For some people, blood sugars increase slightly on statins.  1 in 500 people with pre-diabetes become diabetic 6 months faster.  -Statins do not cause Alzheimer's  -If someone makes it to 75 without a reason to be on a statin for primary prevention then it is not necessary to start statin therapy.    Statin holiday:  Your cholesterol medication can cause muscle aches/sore joints but sometimes it can be difficult to know if it is happening.  Stop your statin therapy for 1 week, note if you feel better, resume the therapy, note if you feel worse again.  Report back to me if you think your statin is making you feel worse.      It is my pleasure to participate in the care of Ms. Rapp.  Please do not hesitate to contact me with questions or concerns.    Maite Ahuja MD, [facility]  Cardiologist, [facility]    Please note that this dictation was created using voice recognition software. I have made every reasonable attempt to correct obvious errors, but it is possible there are errors of grammar and possibly content that I did not discover before finalizing the note.      Jazmyne Maddox, P.A.-C.  [id number]  [place] [address]  Via Fax: [phone]

## 2024-11-26 NOTE — PATIENT INSTRUCTIONS
-In about 4 weeks, have your basic chemistry drawn in [place].  If the potassium and sodium continue to be low, we are going to stop your hydrochlorothiazide part of your valsartan.  We will prescribe valsartan by itself with a new medication called spironolactone 25 mg daily.  We will also send you another follow-up blood test to be drawn in [place] in the mail.    -Since you are on the realm of primary prevention, statins are optional but encouraged for vascular protection.  If we do start a statin, we will start with atorvastatin 10 mg and I will ask you to take co-Q10, any brand, over-the-counter, follow the instructions.    -Keep moving.  If you are not able to regain your functional status or if you are continuously limited by discomfort in the chest or unusual shortness of breath and you cannot make strides, report back for your chemical nuclear stress test.  The purpose of the stress test is to identify clinically relevant blockage in the heart artery of 70% or more that might need to be fixed.    It is important to check blood pressure at home from time to time at home to ensure your blood pressure is in a good range because we do not check it correctly in the doctor's office.    Checking Blood Pressure:    -Be sure you have a good blood pressure cuff, spend in the $40-60, ideally a “calibrated” cuff such as Omron.  I recommend purchasing your cuff from a company with a good return policy.    -Your BP machine should be an automatic, upper arm cuff.  -Measure your arm circumference to get the correct size, probably adult Large.     -Sit with back support, legs uncrossed, feet flat on the floor.  Your arm should be propped up level with your heart but completely supported, dead weight.  -Put the cuff in place, then wait 5 minutes before pushing the button, 10 minutes would be ideal. No talking, no TV, no social media, etc. Set a timer on your phone.    -Do not check blood pressure within 30 minutes of  exercise, caffeine, or large meals.    -It is ok to check immediately if you are experiencing a symptom which may be related to high or low blood pressure.    -Write your blood pressures down, keep a log and bring this to your appointments.    -Check no more than 1 time a day on normal days, maybe 1-2 times per week, try different times of day.  Typically do not check in the morning before your medications unless this information is helpful for you.    -You are concerned your blood pressure cuff is not giving you accurate data, you can bring your cuff to at least one nursing appointment where it can be calibrated to a manual cuff.    -Goal blood pressure is at least under 130/80, ideally under 120/80.  If you think your BP is overall too high, reach out to your PCP or another provider for assistance.    Salt restriction is key for blood pressure control. Salt is killing Americans, it is in almost everything.  Salt=sodium=sea salt, guidelines say stay under 2,400 mg daily but I ask for under 3,000 mg daily if possible.  Get salt smart, start looking at labels, count it up.  Salt is hidden in everything, salad dressing, sauces, cheese, most canned food, any processed meat.     Primary prevention of heart attack and stroke:    Most people over 70 have some degree of calcified heart disease that is not clinically relevant. Unfortunately, placing a stent over heart disease that is not clinically relevant does not reduce mortality.  We cannot bypass blockages that are not 70% or more because the bypass grafts will not mature and will be lost.  Blockages in the heart artery should only have a stent or bypass if they are more than 70% blocked and causing symptoms (at which point most people have symptoms such as chest pain or unusual shortness of breath with activity that goes away with rest).  People having a sudden heart attack should have a stent placed in the blocked artery.  People having chest pain that limits their  ability to function could consider having a stent placed in the artery.  Otherwise, medication and lifestyle changes are the preferred treatments and are very powerful.    Lifestyle and medications are typically more important than stents or bypass outside of a medical emergency.    The only things to do for calcified heart disease to lower your risk of sudden heart attack (or stroke which is the same disease but a different location- in the brain):  -Keep LDL cholesterol under 100 (or under 70 if you have already had a heart attack/stroke or documented vascular disease).  -If appropriate, take a statin medication (as a vascular medication, not just a cholesterol medication) which is our most powerful weapon to stabilize the plaques and reduce inflammation making them less likely to rupture open and cause a sudden heart attack/stroke.  -Control blood pressure, under 130/80, ideally closer to 120/80.  -Control blood sugar, don't get diabetes.  -Don't smoke.  -Eat a heart healthy diet that reduces inflammation: Pritikin, Mediterranean, Vegetarian, Vegan  -Get regular exercise: 150 minutes of moderate exercise OR 75 minutes of very vigorous exercise every week.  -Keep your body mass index under 30, ideal BMI is 20-25.  -Find lorraine in life, manage stress, develop close/meaningful personal relationships (proven to help people live longer)  -Know the signs and symptoms of heart attack and stroke and when to call 9-1-1.    Signs of a heart attack: Anything very intense coming from the chest that lasts more than 15 minutes, consider calling 911.  -Pain or pressure in the chest that is intense, lasts more than 15 minutes, not explained by other known reasons such as known/similar heartburn  -It can feel like severe heart burn but associated with nausea, vomiting  -It can be vague pain that radiates to the neck, jaw, shoulders, arm/arms, wrap around your back or even cause a toothache  -Can be associated with unusual shortness  "of breath at rest or sense of impending doom  *If you think you are having a heart attack, chew up 4 baby aspirin (81 mg) and call 911.    Signs of a stroke: sudden inability to talk, smile on one side, confusion, inability to move arm/leg on one side, numbness/tingling of arm/leg on one side that is not typical. \"Think FAST.\"  F=face drooping  A=arm weakness  S=Speech difficulty  T= time to call 911 (do not give ride to someone, call ambulance to alert the hospital to start stroke protocol)    Please look into the following food lifestyles:  Mediterranean diet.  Pritikin - used at [facility], probably one of the best for anti-inflammatory  Vegan/Vegetarian diet.  DASH diet.    Resources to learn more:  -American Heart Association  -Www.Cardiosmart.org, sponsored by the American College of cardiology.    Regarding statins:  -There is a lot of misinformation about statins.  -It is incredibly rare to have a debilitating reaction affecting the muscles.  -Some people have achy muscles without damage (this occurs in 5 to 6% of the population).  -If statin therapy is right for you and you cannot tolerate 1 statin, we have many different statins to try.  -Sometimes we find a reversible cause of statin intolerance such as vitamin D deficiency or co-Q10 deficiency.  -Keep working with your provider until you find a dose and brand of statin therapy that you can take without having any side effects.  -Side effects can also include abnormal liver testing which does not lead to permanent liver damage and sometimes we need to keep patients on statins and tolerate mildly abnormal liver function testing.  -For some people, blood sugars increase slightly on statins.  1 in 500 people with pre-diabetes become diabetic 6 months faster.  -Statins do not cause Alzheimer's  -If someone makes it to 75 without a reason to be on a statin for primary prevention then it is not necessary to start statin therapy.    Statin " holiday:  Your cholesterol medication can cause muscle aches/sore joints but sometimes it can be difficult to know if it is happening.  Stop your statin therapy for 1 week, note if you feel better, resume the therapy, note if you feel worse again.  Report back to me if you think your statin is making you feel worse.

## 2025-01-28 DIAGNOSIS — I10 ESSENTIAL HYPERTENSION: ICD-10-CM

## 2025-01-28 RX ORDER — CARVEDILOL 25 MG/1
25 TABLET ORAL 2 TIMES DAILY WITH MEALS
Qty: 180 TABLET | Refills: 3 | Status: SHIPPED | OUTPATIENT
Start: 2025-01-28

## 2025-04-23 ENCOUNTER — APPOINTMENT (OUTPATIENT)
Dept: URBAN - METROPOLITAN AREA CLINIC 4 | Facility: CLINIC | Age: 85
Setting detail: DERMATOLOGY
End: 2025-04-23

## 2025-04-23 DIAGNOSIS — L81.4 OTHER MELANIN HYPERPIGMENTATION: ICD-10-CM

## 2025-04-23 DIAGNOSIS — L50.1 IDIOPATHIC URTICARIA: ICD-10-CM

## 2025-04-23 DIAGNOSIS — L82.0 INFLAMED SEBORRHEIC KERATOSIS: ICD-10-CM

## 2025-04-23 DIAGNOSIS — L82.1 OTHER SEBORRHEIC KERATOSIS: ICD-10-CM

## 2025-04-23 DIAGNOSIS — Z85.828 PERSONAL HISTORY OF OTHER MALIGNANT NEOPLASM OF SKIN: ICD-10-CM

## 2025-04-23 DIAGNOSIS — L57.0 ACTINIC KERATOSIS: ICD-10-CM

## 2025-04-23 PROBLEM — D23.39 OTHER BENIGN NEOPLASM OF SKIN OF OTHER PARTS OF FACE: Status: ACTIVE | Noted: 2025-04-23

## 2025-04-23 PROCEDURE — ? ORDER TESTS

## 2025-04-23 PROCEDURE — ? OBSERVATION

## 2025-04-23 PROCEDURE — ? DIAGNOSIS COMMENT

## 2025-04-23 PROCEDURE — 17003 DESTRUCT PREMALG LES 2-14: CPT | Mod: 59

## 2025-04-23 PROCEDURE — ? ADDITIONAL NOTES

## 2025-04-23 PROCEDURE — 99213 OFFICE O/P EST LOW 20 MIN: CPT | Mod: 25

## 2025-04-23 PROCEDURE — 17110 DESTRUCTION B9 LES UP TO 14: CPT

## 2025-04-23 PROCEDURE — ? LIQUID NITROGEN

## 2025-04-23 PROCEDURE — 17000 DESTRUCT PREMALG LESION: CPT | Mod: 59

## 2025-04-23 PROCEDURE — ? COUNSELING

## 2025-04-23 ASSESSMENT — LOCATION SIMPLE DESCRIPTION DERM
LOCATION SIMPLE: RIGHT PRETIBIAL REGION
LOCATION SIMPLE: RIGHT LIP
LOCATION SIMPLE: LEFT HAND
LOCATION SIMPLE: RIGHT UPPER BACK
LOCATION SIMPLE: LEFT LIP
LOCATION SIMPLE: NOSE
LOCATION SIMPLE: RIGHT FOREARM
LOCATION SIMPLE: CHEST
LOCATION SIMPLE: UPPER BACK
LOCATION SIMPLE: RIGHT POSTERIOR UPPER ARM
LOCATION SIMPLE: RIGHT HAND
LOCATION SIMPLE: LEFT CHEEK
LOCATION SIMPLE: LEFT FOREARM
LOCATION SIMPLE: LEFT EAR
LOCATION SIMPLE: RIGHT EAR

## 2025-04-23 ASSESSMENT — LOCATION DETAILED DESCRIPTION DERM
LOCATION DETAILED: RIGHT SUPERIOR MEDIAL UPPER BACK
LOCATION DETAILED: LEFT LATERAL SUPERIOR CHEST
LOCATION DETAILED: RIGHT RADIAL DORSAL HAND
LOCATION DETAILED: LEFT LATERAL SUBMANDIBULAR CHEEK
LOCATION DETAILED: LEFT RADIAL DORSAL HAND
LOCATION DETAILED: RIGHT DISTAL POSTERIOR UPPER ARM
LOCATION DETAILED: LEFT PROXIMAL DORSAL FOREARM
LOCATION DETAILED: RIGHT PROXIMAL PRETIBIAL REGION
LOCATION DETAILED: RIGHT PROXIMAL DORSAL FOREARM
LOCATION DETAILED: RIGHT DORSAL INDEX FINGER METACARPOPHALANGEAL JOINT
LOCATION DETAILED: LEFT UPPER CUTANEOUS LIP
LOCATION DETAILED: RIGHT UPPER CUTANEOUS LIP
LOCATION DETAILED: INFERIOR THORACIC SPINE
LOCATION DETAILED: RIGHT MEDIAL UPPER BACK
LOCATION DETAILED: LEFT SUPERIOR HELIX
LOCATION DETAILED: RIGHT CRUS OF HELIX
LOCATION DETAILED: NASAL SUPRATIP

## 2025-04-23 ASSESSMENT — LOCATION ZONE DERM
LOCATION ZONE: NOSE
LOCATION ZONE: TRUNK
LOCATION ZONE: FACE
LOCATION ZONE: LIP
LOCATION ZONE: ARM
LOCATION ZONE: HAND
LOCATION ZONE: EAR
LOCATION ZONE: LEG

## 2025-04-25 ENCOUNTER — HOSPITAL ENCOUNTER (OUTPATIENT)
Dept: LAB | Facility: MEDICAL CENTER | Age: 85
End: 2025-04-25
Attending: DERMATOLOGY
Payer: MEDICARE

## 2025-04-25 ENCOUNTER — HOSPITAL ENCOUNTER (OUTPATIENT)
Dept: LAB | Facility: MEDICAL CENTER | Age: 85
End: 2025-04-25
Attending: NURSE PRACTITIONER
Payer: MEDICARE

## 2025-04-25 LAB
ALBUMIN SERPL BCP-MCNC: 3.7 G/DL (ref 3.2–4.9)
ALBUMIN/GLOB SERPL: 1.1 G/DL
ALP SERPL-CCNC: 73 U/L (ref 30–99)
ALT SERPL-CCNC: 19 U/L (ref 2–50)
ANION GAP SERPL CALC-SCNC: 12 MMOL/L (ref 7–16)
AST SERPL-CCNC: 23 U/L (ref 12–45)
BASOPHILS # BLD AUTO: 0.8 % (ref 0–1.8)
BASOPHILS # BLD: 0.05 K/UL (ref 0–0.12)
BILIRUB SERPL-MCNC: <0.2 MG/DL (ref 0.1–1.5)
BUN SERPL-MCNC: 10 MG/DL (ref 8–22)
CALCIUM ALBUM COR SERPL-MCNC: 8.9 MG/DL (ref 8.5–10.5)
CALCIUM SERPL-MCNC: 8.7 MG/DL (ref 8.5–10.5)
CHLORIDE SERPL-SCNC: 97 MMOL/L (ref 96–112)
CO2 SERPL-SCNC: 24 MMOL/L (ref 20–33)
CREAT SERPL-MCNC: 0.79 MG/DL (ref 0.5–1.4)
EOSINOPHIL # BLD AUTO: 0.23 K/UL (ref 0–0.51)
EOSINOPHIL NFR BLD: 3.6 % (ref 0–6.9)
ERYTHROCYTE [DISTWIDTH] IN BLOOD BY AUTOMATED COUNT: 50.5 FL (ref 35.9–50)
GFR SERPLBLD CREATININE-BSD FMLA CKD-EPI: 74 ML/MIN/1.73 M 2
GLOBULIN SER CALC-MCNC: 3.4 G/DL (ref 1.9–3.5)
GLUCOSE SERPL-MCNC: 95 MG/DL (ref 65–99)
HCT VFR BLD AUTO: 37.8 % (ref 37–47)
HGB BLD-MCNC: 12.3 G/DL (ref 12–16)
IMM GRANULOCYTES # BLD AUTO: 0.01 K/UL (ref 0–0.11)
IMM GRANULOCYTES NFR BLD AUTO: 0.2 % (ref 0–0.9)
LYMPHOCYTES # BLD AUTO: 2.18 K/UL (ref 1–4.8)
LYMPHOCYTES NFR BLD: 34.4 % (ref 22–41)
MCH RBC QN AUTO: 28.9 PG (ref 27–33)
MCHC RBC AUTO-ENTMCNC: 32.5 G/DL (ref 32.2–35.5)
MCV RBC AUTO: 88.9 FL (ref 81.4–97.8)
MONOCYTES # BLD AUTO: 0.61 K/UL (ref 0–0.85)
MONOCYTES NFR BLD AUTO: 9.6 % (ref 0–13.4)
NEUTROPHILS # BLD AUTO: 3.25 K/UL (ref 1.82–7.42)
NEUTROPHILS NFR BLD: 51.4 % (ref 44–72)
NRBC # BLD AUTO: 0 K/UL
NRBC BLD-RTO: 0 /100 WBC (ref 0–0.2)
PLATELET # BLD AUTO: 362 K/UL (ref 164–446)
PMV BLD AUTO: 11.6 FL (ref 9–12.9)
POTASSIUM SERPL-SCNC: 3.9 MMOL/L (ref 3.6–5.5)
PROT SERPL-MCNC: 7.1 G/DL (ref 6–8.2)
RBC # BLD AUTO: 4.25 M/UL (ref 4.2–5.4)
SODIUM SERPL-SCNC: 133 MMOL/L (ref 135–145)
TSH SERPL DL<=0.005 MIU/L-ACNC: 1.18 UIU/ML (ref 0.38–5.33)
WBC # BLD AUTO: 6.3 K/UL (ref 4.8–10.8)

## 2025-04-25 PROCEDURE — 36415 COLL VENOUS BLD VENIPUNCTURE: CPT

## 2025-04-25 PROCEDURE — 80053 COMPREHEN METABOLIC PANEL: CPT

## 2025-04-25 PROCEDURE — 85025 COMPLETE CBC W/AUTO DIFF WBC: CPT

## 2025-04-25 PROCEDURE — 87086 URINE CULTURE/COLONY COUNT: CPT

## 2025-04-25 PROCEDURE — 84443 ASSAY THYROID STIM HORMONE: CPT

## 2025-04-27 LAB
BACTERIA UR CULT: NORMAL
SIGNIFICANT IND 70042: NORMAL
SITE SITE: NORMAL
SOURCE SOURCE: NORMAL

## 2025-05-04 NOTE — PROGRESS NOTES
Medical decision making:  -In there realm of primary prevention so statins are optional though encouraged to take a statin for vascular protection.  It is up to her and I respect her decision.  -For now I have placed atorvastatin on her intolerance list because is not appropriate to ask her to take a statin if she chooses to do so.  She is never actually taken a statin and does not have a true tolerance, this is her only way to adjudicate this issue in the Epic system.  -Regarding her low sodium and potassium, this would be related to HCTZ.  Since he is about to leave for [place] for 4 months, will give her a BMP to be drawn down there in a few weeks.  If Na remains low, we will switch her HCTZ to spironolactone 25 mg daily.  We will then send her a BMP in the mail which can have it redrawn again to watch the potassium level.  -She could be on the spectrum of primary hyperaldosteronism, spironolactone could be a good choice.  However, it is considered high risk associated with hyperkalemia so it does require blood work.  -I want her to continue to exercise and stay physically active.  If she is having any setbacks and we discussed a chemical nuclear perfusion stress test to evaluate for possibly clinical disease in the proximal LAD.  I would not want her to go on a treadmill, rather perfusion stress test.  -I have asked her to modify how she checks blood pressure at home and to take her blood pressure cuff with her to [place].  -I will see her back in about 4 months after she gets back from [place].    Problem list:  1. Coronary artery calcification seen on CAT scan    2. Essential hypertension, benign    3. Dyslipidemia    4. Hypokalemia    5. Hyponatremia    6. Other chest pain    7. BASSETT (dyspnea on exertion)           Chief Complaint:   No chief complaint on file.      History of Present Illness:  Danica Rapp is a 84 y.o. female who returns for long-term management of coronary artery calcification,  hypertension, BASSETT, HLP.    Previously followed by Dr. Douglas, more recently seen by SHADY Ladd.    CAC 93.  We discussed primary prevention statin at the age of 84.  I do not think it is necessary for primary prevention.  However, her calcification is in the proximal LAD which can be considered high risk.  Information given for her to make her best decision about statins.    Minimal hyperlipidemia, .    Hypertension, I have asked her to modify how she checks at home and I have asked her to take a cuff with her to [place].    BASSETT, this seems to improve with exercise.    Struggling with recurrent UTIs and discomfort.    No family history of premature coronary artery disease.  No prior smoking history.  No history of diabetes.  No history of autoimmune disease such as lupus or rheumatoid arthritis.  No history of chest radiation or cardiotoxic chemotherapy.  No chronic kidney disease.  No ETOH overuse.  No caffeine overuse.  No recreation substance use.  No hx asthma.    Tries to walk for exercise which helps with shortness of breath and weight management.  More recently limited by UTI problems.  Not limited by chest pain, pressure or tightness with activity.  No significant dyspnea on exertion, orthopnea or lower extremity swelling.  No significant palpitations, lightheadedness, or presyncope/syncope.  No symptoms of leg claudication.  No stroke/TIA like symptoms.    Lives in [place].  Stays in [place] for 4 months of the year, typically at a rental property.  Point of contact is her daughter Maite Briones.    Wt Readings from Last 5 Encounters:   11/26/24 71.7 kg (158 lb)   11/01/23 72.1 kg (159 lb)   08/17/23 72.6 kg (160 lb)   06/27/23 70.3 kg (155 lb)   10/07/22 69.4 kg (153 lb)       DATA REVIEWED by me:  ECG (my personal interpretation) 11/26/2024 sinus, 82, rightward axis  3-7-22 sinus 70 low voltage throughout    Echo  5/26/2022  Normal left ventricular size and function, no wall motion  "abnormality.  9-12-19  No prior study is available for comparison.  Mild asymmetric septal hypertrophy.  Normal left ventricular systolic function.  Left ventricular ejection fraction is visually estimated to be 70%.  Normal right ventricular size and systolic function.  Normal left atrial size.  Mild MAC. No mitral stenosis.  Trace mitral regurgitation.  Structurally normal aortic valve without significant stenosis or   regurgitation.  Normal pericardium without effusion.  Normal aortic root for body surface area.       Most recent labs:       Lab Results   Component Value Date/Time    WBC 6.3 04/25/2025 03:19 PM    HEMOGLOBIN 12.3 04/25/2025 03:19 PM    HEMATOCRIT 37.8 04/25/2025 03:19 PM    MCV 88.9 04/25/2025 03:19 PM      Lab Results   Component Value Date/Time    SODIUM 133 (L) 04/25/2025 03:19 PM    POTASSIUM 3.9 04/25/2025 03:19 PM    CHLORIDE 97 04/25/2025 03:19 PM    CO2 24 04/25/2025 03:19 PM    GLUCOSE 95 04/25/2025 03:19 PM    BUN 10 04/25/2025 03:19 PM    CREATININE 0.79 04/25/2025 03:19 PM      Lab Results   Component Value Date/Time    ASTSGOT 23 04/25/2025 03:19 PM    ALTSGPT 19 04/25/2025 03:19 PM    ALBUMIN 3.7 04/25/2025 03:19 PM      Lab Results   Component Value Date/Time    CHOLSTRLTOT 188 11/06/2024 11:09 AM     (H) 11/06/2024 11:09 AM    HDL 49 11/06/2024 11:09 AM    TRIGLYCERIDE 95 11/06/2024 11:09 AM     No results for input(s): \"NTPROBNP\", \"TROPONINT\" in the last 72 hours.      Past Medical History:   Diagnosis Date    Chickenpox     Essential hypertension, benign 6/5/2012    GERD (gastroesophageal reflux disease)     [place] measles     Mumps     Nasal drainage     Nonspecific abnormal electrocardiogram (ECG) (EKG) 6/5/2012    Other chest pain 6/5/2012     Past Surgical History:   Procedure Laterality Date    CHOLECYSTECTOMY      HYSTERECTOMY LAPAROSCOPY       Family History   Problem Relation Age of Onset    Lung Disease Mother     Lung Disease Father     Cancer Father      Social " History     Socioeconomic History    Marital status:      Spouse name: Not on file    Number of children: Not on file    Years of education: Not on file    Highest education level: Not on file   Occupational History    Not on file   Tobacco Use    Smoking status: Never    Smokeless tobacco: Never   Vaping Use    Vaping status: Never Used   Substance and Sexual Activity    Alcohol use: Yes     Alcohol/week: 1.2 - 1.8 oz     Types: 2 Glasses of wine per week     Comment: occ    Drug use: No    Sexual activity: Not on file   Other Topics Concern    Not on file   Social History Narrative    Not on file     Social Drivers of Health     Financial Resource Strain: Not on file   Food Insecurity: Not on file   Transportation Needs: Not on file   Physical Activity: Not on file   Stress: Not on file   Social Connections: Not on file   Intimate Partner Violence: Not on file   Housing Stability: Not on file     Allergies   Allergen Reactions    Macrodantin [Nitrofurantoin Macrocrystal] Itching and Swelling    Amlodipine Swelling    Atorvastatin      Not appropriate for primary prevention at 84.    Augmentin Itching     Headaches    Cefdinir Hives, Itching and Rash    Z-Pako [Azithromycin] Itching and Nausea       Current Outpatient Medications   Medication Sig Dispense Refill    carvedilol (COREG) 25 MG Tab TAKE 1 TABLET BY MOUTH TWICE DAILY WITH MEALS 180 Tablet 3    Loratadine (CLARITIN PO) Take  by mouth.      diphenhydrAMINE HCl (BENADRYL ALLERGY PO) Take  by mouth.      phenazopyridine (PYRIDIUM) 200 MG Tab Take 200 mg by mouth 3 times a day as needed.      potassium chloride (KLOR-CON) 8 MEQ tablet Take 3 Tablets by mouth every day. 270 Tablet 3    valsartan-hydrochlorothiazide (DIOVAN-HCT) 320-12.5 MG per tablet Take 1 Tablet by mouth every day. 90 Tablet 3    estradiol (VIVELLE DOT) 0.1 MG/24HR PATCH BIWEEKLY APPLY 1 PATCH TOPICALLY TO THE SKIN 2 TIMES EVERY WEEK      vitamin D (CHOLECALCIFEROL) 1000 UNIT Tab Take  1,000 Units by mouth every day. 2000mg      Probiotic Product (PROBIOTIC DAILY PO) Take 1 Each by mouth every day.      Calcium Carbonate-Vitamin D (CALCIUM + D PO) Take 500 mg by mouth every day.      Multiple Vitamin (MULTI-VITAMIN DAILY PO) Take  by mouth every day.      omeprazole (PRILOSEC) 20 MG CPDR Take 20 mg by mouth every day.         No current facility-administered medications for this visit.       ROS  All others systems reviewed and negative.    There were no vitals taken for this visit. There is no height or weight on file to calculate BMI.    General: No acute distress. Well nourished.  HEENT: EOM grossly intact, no scleral icterus, no pharyngeal erythema.   Neck:  No JVD, no bruits, trachea midline  CVS: RRR. Normal S1, S2. No M/R/G. No LE edema.  2+ radial pulses, 2+ PT pulses  Resp: CTAB. No wheezing or crackles/rhonchi. Normal respiratory effort.  Abdomen: Soft, NT, no nanda hepatomegaly.  MSK/Ext: No clubbing or cyanosis.  Skin: Warm and dry, no rashes.  Neurological: CN III-XII grossly intact. No focal deficits.   Psych: A&O x 3, appropriate affect, good judgement    Physical Exam listed below was completed in entrety today and unchanged from 11-26-24 except where noted.  Some elements were copied from my note of that same day, which have been updated where appropriate and all reflect current meedical decision making from today.  I have confirmed and edited as necessary, the PFSH and ROS obtained by others.    No follow-ups on file.    Written instructions given today:    -In about 4 weeks, have your basic chemistry drawn in [place].  If the potassium and sodium continue to be low, we are going to stop your hydrochlorothiazide part of your valsartan.  We will prescribe valsartan by itself with a new medication called spironolactone 25 mg daily.  We will also send you another follow-up blood test to be drawn in [place] in the mail.    -Since you are on the realm of primary prevention, statins  are optional but encouraged for vascular protection.  If we do start a statin, we will start with atorvastatin 10 mg and I will ask you to take co-Q10, any brand, over-the-counter, follow the instructions.    -Keep moving.  If you are not able to regain your functional status or if you are continuously limited by discomfort in the chest or unusual shortness of breath and you cannot make strides, report back for your chemical nuclear stress test.  The purpose of the stress test is to identify clinically relevant blockage in the heart artery of 70% or more that might need to be fixed.    It is important to check blood pressure at home from time to time at home to ensure your blood pressure is in a good range because we do not check it correctly in the doctor's office.    Checking Blood Pressure:    -Be sure you have a good blood pressure cuff, spend in the $40-60, ideally a “calibrated” cuff such as Omron.  I recommend purchasing your cuff from a company with a good return policy.    -Your BP machine should be an automatic, upper arm cuff.  -Measure your arm circumference to get the correct size, probably adult Large.     -Sit with back support, legs uncrossed, feet flat on the floor.  Your arm should be propped up level with your heart but completely supported, dead weight.  -Put the cuff in place, then wait 5 minutes before pushing the button, 10 minutes would be ideal. No talking, no TV, no social media, etc. Set a timer on your phone.    -Do not check blood pressure within 30 minutes of exercise, caffeine, or large meals.    -It is ok to check immediately if you are experiencing a symptom which may be related to high or low blood pressure.    -Write your blood pressures down, keep a log and bring this to your appointments.    -Check no more than 1 time a day on normal days, maybe 1-2 times per week, try different times of day.  Typically do not check in the morning before your medications unless this information  is helpful for you.    -You are concerned your blood pressure cuff is not giving you accurate data, you can bring your cuff to at least one nursing appointment where it can be calibrated to a manual cuff.    -Goal blood pressure is at least under 130/80, ideally under 120/80.  If you think your BP is overall too high, reach out to your PCP or another provider for assistance.    Salt restriction is key for blood pressure control. Salt is killing Americans, it is in almost everything.  Salt=sodium=sea salt, guidelines say stay under 2,400 mg daily but I ask for under 3,000 mg daily if possible.  Get salt smart, start looking at labels, count it up.  Salt is hidden in everything, salad dressing, sauces, cheese, most canned food, any processed meat.     Primary prevention of heart attack and stroke:    Most people over 70 have some degree of calcified heart disease that is not clinically relevant. Unfortunately, placing a stent over heart disease that is not clinically relevant does not reduce mortality.  We cannot bypass blockages that are not 70% or more because the bypass grafts will not mature and will be lost.  Blockages in the heart artery should only have a stent or bypass if they are more than 70% blocked and causing symptoms (at which point most people have symptoms such as chest pain or unusual shortness of breath with activity that goes away with rest).  People having a sudden heart attack should have a stent placed in the blocked artery.  People having chest pain that limits their ability to function could consider having a stent placed in the artery.  Otherwise, medication and lifestyle changes are the preferred treatments and are very powerful.    Lifestyle and medications are typically more important than stents or bypass outside of a medical emergency.    The only things to do for calcified heart disease to lower your risk of sudden heart attack (or stroke which is the same disease but a different  "location- in the brain):  -Keep LDL cholesterol under 100 (or under 70 if you have already had a heart attack/stroke or documented vascular disease).  -If appropriate, take a statin medication (as a vascular medication, not just a cholesterol medication) which is our most powerful weapon to stabilize the plaques and reduce inflammation making them less likely to rupture open and cause a sudden heart attack/stroke.  -Control blood pressure, under 130/80, ideally closer to 120/80.  -Control blood sugar, don't get diabetes.  -Don't smoke.  -Eat a heart healthy diet that reduces inflammation: Pritikin, Mediterranean, Vegetarian, Vegan  -Get regular exercise: 150 minutes of moderate exercise OR 75 minutes of very vigorous exercise every week.  -Keep your body mass index under 30, ideal BMI is 20-25.  -Find lorraine in life, manage stress, develop close/meaningful personal relationships (proven to help people live longer)  -Know the signs and symptoms of heart attack and stroke and when to call 9-1-1.    Signs of a heart attack: Anything very intense coming from the chest that lasts more than 15 minutes, consider calling 911.  -Pain or pressure in the chest that is intense, lasts more than 15 minutes, not explained by other known reasons such as known/similar heartburn  -It can feel like severe heart burn but associated with nausea, vomiting  -It can be vague pain that radiates to the neck, jaw, shoulders, arm/arms, wrap around your back or even cause a toothache  -Can be associated with unusual shortness of breath at rest or sense of impending doom  *If you think you are having a heart attack, chew up 4 baby aspirin (81 mg) and call 911.    Signs of a stroke: sudden inability to talk, smile on one side, confusion, inability to move arm/leg on one side, numbness/tingling of arm/leg on one side that is not typical. \"Think FAST.\"  F=face drooping  A=arm weakness  S=Speech difficulty  T= time to call 911 (do not give ride to " someone, call ambulance to alert the hospital to start stroke protocol)    Please look into the following food lifestyles:  Mediterranean diet.  Pritikin - used at [facility], probably one of the best for anti-inflammatory  Vegan/Vegetarian diet.  DASH diet.    Resources to learn more:  -American Heart Association  -Www.Cardiosmart.org, sponsored by the American College of cardiology.    Regarding statins:  -There is a lot of misinformation about statins.  -It is incredibly rare to have a debilitating reaction affecting the muscles.  -Some people have achy muscles without damage (this occurs in 5 to 6% of the population).  -If statin therapy is right for you and you cannot tolerate 1 statin, we have many different statins to try.  -Sometimes we find a reversible cause of statin intolerance such as vitamin D deficiency or co-Q10 deficiency.  -Keep working with your provider until you find a dose and brand of statin therapy that you can take without having any side effects.  -Side effects can also include abnormal liver testing which does not lead to permanent liver damage and sometimes we need to keep patients on statins and tolerate mildly abnormal liver function testing.  -For some people, blood sugars increase slightly on statins.  1 in 500 people with pre-diabetes become diabetic 6 months faster.  -Statins do not cause Alzheimer's  -If someone makes it to 75 without a reason to be on a statin for primary prevention then it is not necessary to start statin therapy.    Statin holiday:  Your cholesterol medication can cause muscle aches/sore joints but sometimes it can be difficult to know if it is happening.  Stop your statin therapy for 1 week, note if you feel better, resume the therapy, note if you feel worse again.  Report back to me if you think your statin is making you feel worse.      It is my pleasure to participate in the care of Ms. Rapp.  Please do not hesitate to contact me with  questions or concerns.    Maite Ahuja MD, [facility]  Cardiologist, [facility]    Please note that this dictation was created using voice recognition software. I have made every reasonable attempt to correct obvious errors, but it is possible there are errors of grammar and possibly content that I did not discover before finalizing the note.

## 2025-05-13 ENCOUNTER — APPOINTMENT (OUTPATIENT)
Dept: CARDIOLOGY | Facility: MEDICAL CENTER | Age: 85
End: 2025-05-13
Attending: INTERNAL MEDICINE
Payer: MEDICARE

## 2025-05-13 DIAGNOSIS — R07.89 OTHER CHEST PAIN: ICD-10-CM

## 2025-05-13 DIAGNOSIS — R06.09 DOE (DYSPNEA ON EXERTION): ICD-10-CM

## 2025-05-13 DIAGNOSIS — I25.10 CORONARY ARTERY CALCIFICATION SEEN ON CAT SCAN: ICD-10-CM

## 2025-05-13 DIAGNOSIS — I10 ESSENTIAL HYPERTENSION, BENIGN: ICD-10-CM

## 2025-05-13 DIAGNOSIS — E87.1 HYPONATREMIA: ICD-10-CM

## 2025-05-13 DIAGNOSIS — E87.6 HYPOKALEMIA: ICD-10-CM

## 2025-05-13 DIAGNOSIS — E78.5 DYSLIPIDEMIA: ICD-10-CM

## 2025-05-20 ENCOUNTER — HOSPITAL ENCOUNTER (OUTPATIENT)
Facility: MEDICAL CENTER | Age: 85
End: 2025-05-20
Attending: PHYSICIAN ASSISTANT
Payer: MEDICARE

## 2025-05-20 PROCEDURE — 87186 SC STD MICRODIL/AGAR DIL: CPT

## 2025-05-20 PROCEDURE — 87086 URINE CULTURE/COLONY COUNT: CPT

## 2025-05-20 PROCEDURE — 87077 CULTURE AEROBIC IDENTIFY: CPT

## 2025-05-31 NOTE — PROGRESS NOTES
Medical decision making:  -Today, we are discussing some type of itchy reaction to medication.  It was suggested by her allergy physician that we look at her heart medications.  I think it is reasonable to take a 1 week holiday from each medication in a stepwise fashion to see if we can determine what the problem is.  However, she is feeling more confident taking Xyzal and she may wish to simply stay on the medication and not take the holiday.  -I have always wanted spironolactone for her in place of HCTZ.  I think this will help tremendously with blood pressure.  She can stop taking the potassium.  Follow-up BMP ordered for 2 to 4 weeks.  -If she is doing well in the spironolactone, we can briefly swap out valsartan for lisinopril knowing it previously was tolerated except for a cough which would go away.  -Thereafter if we need to trial off of carvedilol, we will probably need a high dose of Cardura with metoprolol or consider labetalol.  -Blood pressure really spikes, giving her rescue hydralazine.  -Since we are in the realm of primary prevention and she is mated to 84, she does not need statins for primary prevention.  -For now I have placed atorvastatin on her intolerance list because is not appropriate to ask her to take a statin if she chooses to do so.  She is never actually taken a statin and does not have a true tolerance, this is her only way to adjudicate this issue in the Epic system.  -We need to work on some type of cardiovascular exercise she can perform if possible with her injured ankle.  Swimming pool would be great.  I think her shortness of breath is related to reduced exercise tolerance and some weight gain which is what she suspects also.  -If she cannot make strides, would be reasonable to consider a PET nuclear stress test to evaluate disease in the proximal LAD to see if is clinically relevant.  We will discuss if she still having symptoms after I get her blood pressure better  controlled.  -We will work on getting her better optimized before she heads to [place] at the end of November.  - RTC in 4 to 6 weeks and second appointment scheduled in September or October before she heads to [place].    Problem list:  1. Coronary artery calcification seen on CAT scan    2. BASSETT (dyspnea on exertion)    3. Essential hypertension, benign  - hydrALAZINE (APRESOLINE) 10 MG Tab; Take 1 Tablet by mouth see administration instructions. 1 to 2 tablets every 8 hours as needed for SBP greater than 180 mmHg.  Dispense: 30 Tablet; Refill: 0  - spironolactone (ALDACTONE) 25 MG Tab; Take 1 Tablet by mouth every day.  Dispense: 90 Tablet; Refill: 3  - lisinopril (PRINIVIL) 40 MG tablet; Take 1 Tablet by mouth see administration instructions. Take lisinopril 40 mg in place of valsartan 320 mg for 1 week only  Dispense: 7 Tablet; Refill: 0  - Basic Metabolic Panel; Future    4. Hyponatremia    5. Hypokalemia    6. Other chest pain    7. Sensitivity to medication, subsequent encounter    Other orders  - methenamine hip (HIPPREX) 1 GM Tab; Take 1 tablet twice a day by oral route for 30 days.  - estradiol (ESTRACE) 0.1 MG/GM vaginal cream; Insert  into the vagina two times a week.    Chief Complaint:   Chief Complaint   Patient presents with    Hypertension    Dyslipidemia    Chest Pain     History of Present Illness:  Danica Rapp is a 84 y.o. female who returns for long-term management of coronary artery calcification, hypertension, BASSETT, HLP, hyponatremia, hypokalemia, medication intolerance.    CAC 93.  We discussed primary prevention statin at the age of 84.  I do not think it is necessary for primary prevention.  However, her calcification is in the proximal LAD which can be considered high risk.  Information given for her to make her best decision about statins.    Minimal hyperlipidemia, .  She has opted not to take statins for primary prevention.    Hypertension, BP not yet to goal.  Typically in  the 130s systolic.  Always under 80 diastolic.  Had 1 episode where she was in tremendous pain and blood pressure shot to 203 systolic.  I am giving her as needed hydralazine for BP greater than 180 mmHg.    Intermittent hyponatremia.  Sodium has been in the range of 129-136, more recently 133, taken off of HCTZ.  We are starting spironolactone.    BASSETT has been bothersome.  She thinks this could be related to exercise intolerance and weight gain.  She does sometimes worry about the heart.  Echocardiogram look good in 2022.  No recent stress testing.    Struggling with recurrent UTIs and discomfort.  This seems to have triggered intolerance to some medications and some persistent itching.  She saw an allergist, started taking Xyzal and some Benadryl but having side effects of the Benadryl.  He is going to do some environmental testing but suggested she look into some of her medications.    No family history of premature coronary artery disease.  No prior smoking history.  No history of diabetes.  No history of autoimmune disease such as lupus or rheumatoid arthritis.  No history of chest radiation or cardiotoxic chemotherapy.  No chronic kidney disease.  No ETOH overuse.  No caffeine overuse.  No recreation substance use.  No hx asthma.    Has not been able to exercise, previously like to walk.  Limited by consequences of UTI and has a brace on her right ankle.  Not limited by chest pain, pressure or tightness with activity.  No significant orthopnea or lower extremity swelling.  No significant palpitations, lightheadedness, or presyncope/syncope.  No symptoms of leg claudication.  No stroke/TIA like symptoms.    Lives in [place].  Stays in [place] for 4 months of the year, typically at a rental property.  Point of contact is her daughter Maite Briones.    Wt Readings from Last 5 Encounters:   06/16/25 74.4 kg (164 lb)   05/28/25 72.6 kg (160 lb)   11/26/24 71.7 kg (158 lb)   11/01/23 72.1 kg (159 lb)   08/17/23 72.6 kg  "(160 lb)       DATA REVIEWED by me:  ECG (my personal interpretation) 11/26/2024 sinus, 82, rightward axis  3-7-22 sinus 70 low voltage throughout    Echocardiogram  5/26/2022  Normal left ventricular size and function, no wall motion abnormality.  9-12-19  No prior study is available for comparison.  Mild asymmetric septal hypertrophy.  Normal left ventricular systolic function.  Left ventricular ejection fraction is visually estimated to be 70%.  Normal right ventricular size and systolic function.  Normal left atrial size.  Mild MAC. No mitral stenosis.  Trace mitral regurgitation.  Structurally normal aortic valve without significant stenosis or   regurgitation.  Normal pericardium without effusion.  Normal aortic root for body surface area.       Most recent labs:       Lab Results   Component Value Date/Time    WBC 6.3 04/25/2025 03:19 PM    HEMOGLOBIN 12.3 04/25/2025 03:19 PM    HEMATOCRIT 37.8 04/25/2025 03:19 PM    MCV 88.9 04/25/2025 03:19 PM      Lab Results   Component Value Date/Time    SODIUM 133 (L) 04/25/2025 03:19 PM    POTASSIUM 3.9 04/25/2025 03:19 PM    CHLORIDE 97 04/25/2025 03:19 PM    CO2 24 04/25/2025 03:19 PM    GLUCOSE 95 04/25/2025 03:19 PM    BUN 10 04/25/2025 03:19 PM    CREATININE 0.79 04/25/2025 03:19 PM      Lab Results   Component Value Date/Time    ASTSGOT 23 04/25/2025 03:19 PM    ALTSGPT 19 04/25/2025 03:19 PM    ALBUMIN 3.7 04/25/2025 03:19 PM      Lab Results   Component Value Date/Time    CHOLSTRLTOT 188 11/06/2024 11:09 AM     (H) 11/06/2024 11:09 AM    HDL 49 11/06/2024 11:09 AM    TRIGLYCERIDE 95 11/06/2024 11:09 AM     No results for input(s): \"NTPROBNP\", \"TROPONINT\" in the last 72 hours.      Past Medical History:   Diagnosis Date    Chickenpox     Essential hypertension, benign 6/5/2012    GERD (gastroesophageal reflux disease)     [place] measles     Mumps     Nasal drainage     Nonspecific abnormal electrocardiogram (ECG) (EKG) 6/5/2012    Other chest pain " 6/5/2012     Past Surgical History:   Procedure Laterality Date    CHOLECYSTECTOMY      HYSTERECTOMY LAPAROSCOPY       Family History   Problem Relation Age of Onset    Lung Disease Mother     Lung Disease Father     Cancer Father      Social History     Socioeconomic History    Marital status:      Spouse name: Not on file    Number of children: Not on file    Years of education: Not on file    Highest education level: Not on file   Occupational History    Not on file   Tobacco Use    Smoking status: Never    Smokeless tobacco: Never   Vaping Use    Vaping status: Never Used   Substance and Sexual Activity    Alcohol use: Yes     Alcohol/week: 1.2 - 1.8 oz     Types: 2 Glasses of wine per week     Comment: occ    Drug use: No    Sexual activity: Not on file   Other Topics Concern    Not on file   Social History Narrative    Not on file     Social Drivers of Health     Financial Resource Strain: Not on file   Food Insecurity: Not on file   Transportation Needs: Not on file   Physical Activity: Not on file   Stress: Not on file   Social Connections: Not on file   Intimate Partner Violence: Not on file   Housing Stability: Not on file     Allergies   Allergen Reactions    Macrodantin [Nitrofurantoin Macrocrystal] Itching and Swelling    Amlodipine Swelling    Atorvastatin      Not appropriate for primary prevention at 84.    Augmentin Itching     Headaches    Cefdinir Hives, Itching and Rash    Ciprofloxacin      Tendinitis    Hydrochlorothiazide      Hyponatremia.     Lisinopril      Cough    Z-Pako [Azithromycin] Itching and Nausea       Current Outpatient Medications   Medication Sig Dispense Refill    methenamine hip (HIPPREX) 1 GM Tab Take 1 tablet twice a day by oral route for 30 days.      estradiol (ESTRACE) 0.1 MG/GM vaginal cream Insert  into the vagina two times a week.      hydrALAZINE (APRESOLINE) 10 MG Tab Take 1 Tablet by mouth see administration instructions. 1 to 2 tablets every 8 hours as  "needed for SBP greater than 180 mmHg. 30 Tablet 0    spironolactone (ALDACTONE) 25 MG Tab Take 1 Tablet by mouth every day. 90 Tablet 3    lisinopril (PRINIVIL) 40 MG tablet Take 1 Tablet by mouth see administration instructions. Take lisinopril 40 mg in place of valsartan 320 mg for 1 week only 7 Tablet 0    carvedilol (COREG) 25 MG Tab TAKE 1 TABLET BY MOUTH TWICE DAILY WITH MEALS 180 Tablet 3    phenazopyridine (PYRIDIUM) 200 MG Tab Take 200 mg by mouth 3 times a day as needed.      estradiol (VIVELLE DOT) 0.1 MG/24HR PATCH BIWEEKLY APPLY 1 PATCH TOPICALLY TO THE SKIN 2 TIMES EVERY WEEK      vitamin D (CHOLECALCIFEROL) 1000 UNIT Tab Take 1,000 Units by mouth every day. 2000mg      Probiotic Product (PROBIOTIC DAILY PO) Take 1 Each by mouth every day.      Calcium Carbonate-Vitamin D (CALCIUM + D PO) Take 500 mg by mouth every day.      Multiple Vitamin (MULTI-VITAMIN DAILY PO) Take  by mouth every day.      omeprazole (PRILOSEC) 20 MG CPDR Take 20 mg by mouth every day.         No current facility-administered medications for this visit.       ROS  All others systems reviewed and negative.    BP (!) 140/80 (BP Location: Left arm, Patient Position: Sitting, BP Cuff Size: Adult)   Pulse 75   Ht 1.575 m (5' 2\")   Wt 74.4 kg (164 lb)   SpO2 97%  Body mass index is 30 kg/m².    General: No acute distress. Well nourished.  HEENT: EOM grossly intact, no scleral icterus, no pharyngeal erythema.   Neck:  No JVD at 90, no bruits, trachea midline  CVS: RRR. Normal S1, S2. No significant M/R/G. No Sig evident LE edema.  2+ radial pulses, 2+ PT pulses  Resp: CTAB. No wheezing or crackles/rhonchi. Normal respiratory effort.  Abdomen: Soft, NT, no nanda hepatomegaly.  MSK/Ext: No clubbing or cyanosis.  Skin: Warm and dry, no rashes.  Neurological: CN III-XII grossly intact. No focal deficits.   Psych: A&O x 3, appropriate affect, good judgement    Physical Exam listed below was completed in entrety today and unchanged from " 11-26-24 except where noted.  Some elements were copied from my note of that same day, which have been updated where appropriate and all reflect current meedical decision making from today.  I have confirmed and edited as necessary, the PFSH and ROS obtained by others.    Return in about 6 weeks (around 7/28/2025).    Written instructions given today:      - Please review this plan with your allergy doctor.    - Sometimes we have to have you take a medication holiday of each medication to make sure it is not causing the problem which in your case is the itching or possible allergic reaction.  You can run this by your allergy doctor but in general, typically if you stop something for 2 weeks, you should feel better.  If you are not certain, you start the medication back after 2 weeks and see if you feel worse.    STEP 1:  -Start spironolactone 25 mg once daily in the morning.  This is one of my favorite blood pressure medicines.  Stop taking potassium when you start taking spironolactone.  Spironolactone can help you hold onto potassium.  - Give yourself 2 weeks on the spironolactone to make sure you are doing fine with that before making the next medication change.  - Nonfasting blood test 2 to 4 weeks after starting the new medication to check on your sodium and potassium levels    IF YOU WANT TO KNOW IF THE MEDS ARE CAUSING THE ITCHING, then proceed with the following steps:    STEP 2: Stop your Xyzal for the experiment below.    STEP 3:  -Stop your omeprazole for 1 week, then started back, see if this impacts the itching.     STEP 4:  -Swab lisinopril 40 mg for valsartan 320 mg for 1 week only.  When you go back to the valsartan, any cough you are getting should get better.  This way we can determine if the valsartan is causing the itching.  -Once we know the valsartan is not the problem, resume your valsartan 320 mg.    STEP 5:  -The for step will be swapping your carvedilol for something else.  But I do not  want to do that yet.  I would rather see you back and see how we do with the first 3 steps.    AS NEEDED FOR HIGH BP:  - Hydralazine 10 mg tablets.  You can take any where from 1 to 2 tablets every 8 hours as needed for systolic blood pressure greater than 180 mmHg.      It is my pleasure to participate in the care of Ms. Rapp.  Please do not hesitate to contact me with questions or concerns.    Maite Ahuja MD, [facility]  Cardiologist, [facility]    Please note that this dictation was created using voice recognition software. I have made every reasonable attempt to correct obvious errors, but it is possible there are errors of grammar and possibly content that I did not discover before finalizing the note.

## 2025-06-07 ENCOUNTER — HOSPITAL ENCOUNTER (OUTPATIENT)
Dept: LAB | Facility: MEDICAL CENTER | Age: 85
End: 2025-06-07
Attending: OBSTETRICS & GYNECOLOGY
Payer: MEDICARE

## 2025-06-07 PROCEDURE — 87086 URINE CULTURE/COLONY COUNT: CPT

## 2025-06-09 ENCOUNTER — TELEPHONE (OUTPATIENT)
Dept: CARDIOLOGY | Facility: MEDICAL CENTER | Age: 85
End: 2025-06-09
Payer: MEDICARE

## 2025-06-09 LAB
BACTERIA UR CULT: NORMAL
SIGNIFICANT IND 70042: NORMAL
SITE SITE: NORMAL
SOURCE SOURCE: NORMAL

## 2025-06-09 NOTE — TELEPHONE ENCOUNTER
FAYE    Caller: Danica Rapp     Topic/issue: Patient had an episode last night. Her BP was very high:    06/08/2025  203/ ?  195/80 P: 77  185/79 P: 69    06/09/2025  AM: BP: 185 before meds   12:00 PM today after meds and breakfast 145/66 P:74    Patient has a bad UTI for about 2 weeks now. Her BP was very high last night  along with blurred vision. Her daughter came to sit with her last night but states her pain from the UTI was about 9.5.  Today she is having a dull headache, little nausea, very tired. lightheaded and no appetite.  She has placed a call to her Urologist and is waiting for that call back. She does have an appt 06/16/2025 with MICKEY Ahuja. She did complete labs in April and is not sure if she should do any other labs. She is concerned and would like a call back.     Callback Number: [phone]     Thank you  Cleo QUIÑONES

## 2025-06-10 NOTE — TELEPHONE ENCOUNTER
Phone Number Called: [phone]     Call outcome: Did not leave a detailed message. Requested patient to call back.    Message: Called to discuss.    No more labs needed for OV 6/16 with LS    BP/ HR elevated due to pain, recommend ER for additional support and assessment

## 2025-06-10 NOTE — TELEPHONE ENCOUNTER
Pt returned call. Informed no labs needed for 6/16, though LS may order additional testing at OV    Discussed high BP was related to bladder spasms r/t UTI. Reported high BP was during a bladder spasm and she took her pyridium and then BP went down to 145/60s     She was able to see urologist today    We discussed proper way to take BP. She will record daily and bring to OV for review.     Amt of time on call 30 min.

## 2025-06-16 ENCOUNTER — OFFICE VISIT (OUTPATIENT)
Facility: MEDICAL CENTER | Age: 85
End: 2025-06-16
Attending: INTERNAL MEDICINE
Payer: MEDICARE

## 2025-06-16 VITALS
BODY MASS INDEX: 30.18 KG/M2 | DIASTOLIC BLOOD PRESSURE: 80 MMHG | OXYGEN SATURATION: 97 % | WEIGHT: 164 LBS | HEART RATE: 75 BPM | HEIGHT: 62 IN | SYSTOLIC BLOOD PRESSURE: 140 MMHG

## 2025-06-16 DIAGNOSIS — R07.89 OTHER CHEST PAIN: ICD-10-CM

## 2025-06-16 DIAGNOSIS — T78.40XD SENSITIVITY TO MEDICATION, SUBSEQUENT ENCOUNTER: ICD-10-CM

## 2025-06-16 DIAGNOSIS — I25.10 CORONARY ARTERY CALCIFICATION SEEN ON CAT SCAN: Primary | ICD-10-CM

## 2025-06-16 DIAGNOSIS — E87.6 HYPOKALEMIA: ICD-10-CM

## 2025-06-16 DIAGNOSIS — E87.1 HYPONATREMIA: ICD-10-CM

## 2025-06-16 DIAGNOSIS — R06.09 DOE (DYSPNEA ON EXERTION): ICD-10-CM

## 2025-06-16 DIAGNOSIS — I10 ESSENTIAL HYPERTENSION, BENIGN: ICD-10-CM

## 2025-06-16 PROCEDURE — 99214 OFFICE O/P EST MOD 30 MIN: CPT | Performed by: INTERNAL MEDICINE

## 2025-06-16 PROCEDURE — 99213 OFFICE O/P EST LOW 20 MIN: CPT | Performed by: INTERNAL MEDICINE

## 2025-06-16 PROCEDURE — 3077F SYST BP >= 140 MM HG: CPT | Performed by: INTERNAL MEDICINE

## 2025-06-16 PROCEDURE — 3079F DIAST BP 80-89 MM HG: CPT | Performed by: INTERNAL MEDICINE

## 2025-06-16 RX ORDER — METHENAMINE HIPPURATE 1000 MG/1
TABLET ORAL
COMMUNITY
Start: 2025-06-10

## 2025-06-16 RX ORDER — ESTRADIOL 0.1 MG/G
CREAM VAGINAL
COMMUNITY

## 2025-06-16 RX ORDER — HYDRALAZINE HYDROCHLORIDE 10 MG/1
10 TABLET, FILM COATED ORAL SEE ADMIN INSTRUCTIONS
Qty: 30 TABLET | Refills: 0 | Status: SHIPPED | OUTPATIENT
Start: 2025-06-16

## 2025-06-16 RX ORDER — SPIRONOLACTONE 25 MG/1
25 TABLET ORAL DAILY
Qty: 90 TABLET | Refills: 3 | Status: SHIPPED | OUTPATIENT
Start: 2025-06-16

## 2025-06-16 RX ORDER — LISINOPRIL 40 MG/1
40 TABLET ORAL SEE ADMIN INSTRUCTIONS
Qty: 7 TABLET | Refills: 0 | Status: SHIPPED | OUTPATIENT
Start: 2025-06-16

## 2025-06-16 ASSESSMENT — FIBROSIS 4 INDEX: FIB4 SCORE: 1.22

## 2025-06-16 NOTE — LETTER
[facility]   [address],   [address]  [place] [address]  Phone: [phone]  Fax: [phone]              Danica Rapp  1940    Encounter Date: 6/16/2025    Maite Ahuja M.D.          PROGRESS NOTE:        Medical decision making:  -Today, we are discussing some type of itchy reaction to medication.  It was suggested by her allergy physician that we look at her heart medications.  I think it is reasonable to take a 1 week holiday from each medication in a stepwise fashion to see if we can determine what the problem is.  However, she is feeling more confident taking Xyzal and she may wish to simply stay on the medication and not take the holiday.  -I have always wanted spironolactone for her in place of HCTZ.  I think this will help tremendously with blood pressure.  She can stop taking the potassium.  Follow-up BMP ordered for 2 to 4 weeks.  -If she is doing well in the spironolactone, we can briefly swap out valsartan for lisinopril knowing it previously was tolerated except for a cough which would go away.  -Thereafter if we need to trial off of carvedilol, we will probably need a high dose of Cardura with metoprolol or consider labetalol.  -Blood pressure really spikes, giving her rescue hydralazine.  -Since we are in the realm of primary prevention and she is mated to 84, she does not need statins for primary prevention.  -For now I have placed atorvastatin on her intolerance list because is not appropriate to ask her to take a statin if she chooses to do so.  She is never actually taken a statin and does not have a true tolerance, this is her only way to adjudicate this issue in the Epic system.  -We need to work on some type of cardiovascular exercise she can perform if possible with her injured ankle.  Swimming pool would be great.  I think her shortness of breath is related to reduced exercise tolerance and some weight gain which is  what she suspects also.  -If she cannot make strides, would be reasonable to consider a PET nuclear stress test to evaluate disease in the proximal LAD to see if is clinically relevant.  We will discuss if she still having symptoms after I get her blood pressure better controlled.  -We will work on getting her better optimized before she heads to [place] at the end of November.  - RTC in 4 to 6 weeks and second appointment scheduled in September or October before she heads to [place].    Problem list:  1. Coronary artery calcification seen on CAT scan    2. BASSETT (dyspnea on exertion)    3. Essential hypertension, benign  - hydrALAZINE (APRESOLINE) 10 MG Tab; Take 1 Tablet by mouth see administration instructions. 1 to 2 tablets every 8 hours as needed for SBP greater than 180 mmHg.  Dispense: 30 Tablet; Refill: 0  - spironolactone (ALDACTONE) 25 MG Tab; Take 1 Tablet by mouth every day.  Dispense: 90 Tablet; Refill: 3  - lisinopril (PRINIVIL) 40 MG tablet; Take 1 Tablet by mouth see administration instructions. Take lisinopril 40 mg in place of valsartan 320 mg for 1 week only  Dispense: 7 Tablet; Refill: 0  - Basic Metabolic Panel; Future    4. Hyponatremia    5. Hypokalemia    6. Other chest pain    7. Sensitivity to medication, subsequent encounter    Other orders  - methenamine hip (HIPPREX) 1 GM Tab; Take 1 tablet twice a day by oral route for 30 days.  - estradiol (ESTRACE) 0.1 MG/GM vaginal cream; Insert  into the vagina two times a week.    Chief Complaint:   Chief Complaint   Patient presents with    Hypertension    Dyslipidemia    Chest Pain     History of Present Illness:  Danica Rapp is a 84 y.o. female who returns for long-term management of coronary artery calcification, hypertension, BASSETT, HLP, hyponatremia, hypokalemia, medication intolerance.    CAC 93.  We discussed primary prevention statin at the age of 84.  I do not think it is necessary for primary prevention.  However, her calcification is  in the proximal LAD which can be considered high risk.  Information given for her to make her best decision about statins.    Minimal hyperlipidemia, .  She has opted not to take statins for primary prevention.    Hypertension, BP not yet to goal.  Typically in the 130s systolic.  Always under 80 diastolic.  Had 1 episode where she was in tremendous pain and blood pressure shot to 203 systolic.  I am giving her as needed hydralazine for BP greater than 180 mmHg.    Intermittent hyponatremia.  Sodium has been in the range of 129-136, more recently 133, taken off of HCTZ.  We are starting spironolactone.    BASSETT has been bothersome.  She thinks this could be related to exercise intolerance and weight gain.  She does sometimes worry about the heart.  Echocardiogram look good in 2022.  No recent stress testing.    Struggling with recurrent UTIs and discomfort.  This seems to have triggered intolerance to some medications and some persistent itching.  She saw an allergist, started taking Xyzal and some Benadryl but having side effects of the Benadryl.  He is going to do some environmental testing but suggested she look into some of her medications.    No family history of premature coronary artery disease.  No prior smoking history.  No history of diabetes.  No history of autoimmune disease such as lupus or rheumatoid arthritis.  No history of chest radiation or cardiotoxic chemotherapy.  No chronic kidney disease.  No ETOH overuse.  No caffeine overuse.  No recreation substance use.  No hx asthma.    Has not been able to exercise, previously like to walk.  Limited by consequences of UTI and has a brace on her right ankle.  Not limited by chest pain, pressure or tightness with activity.  No significant orthopnea or lower extremity swelling.  No significant palpitations, lightheadedness, or presyncope/syncope.  No symptoms of leg claudication.  No stroke/TIA like symptoms.    Lives in [place].  Stays in [place] for 4  "months of the year, typically at a rental property.  Point of contact is her daughter Maite Briones.    Wt Readings from Last 5 Encounters:   06/16/25 74.4 kg (164 lb)   05/28/25 72.6 kg (160 lb)   11/26/24 71.7 kg (158 lb)   11/01/23 72.1 kg (159 lb)   08/17/23 72.6 kg (160 lb)       DATA REVIEWED by me:  ECG (my personal interpretation) 11/26/2024 sinus, 82, rightward axis  3-7-22 sinus 70 low voltage throughout    Echocardiogram  5/26/2022  Normal left ventricular size and function, no wall motion abnormality.  9-12-19  No prior study is available for comparison.  Mild asymmetric septal hypertrophy.  Normal left ventricular systolic function.  Left ventricular ejection fraction is visually estimated to be 70%.  Normal right ventricular size and systolic function.  Normal left atrial size.  Mild MAC. No mitral stenosis.  Trace mitral regurgitation.  Structurally normal aortic valve without significant stenosis or   regurgitation.  Normal pericardium without effusion.  Normal aortic root for body surface area.       Most recent labs:       Lab Results   Component Value Date/Time    WBC 6.3 04/25/2025 03:19 PM    HEMOGLOBIN 12.3 04/25/2025 03:19 PM    HEMATOCRIT 37.8 04/25/2025 03:19 PM    MCV 88.9 04/25/2025 03:19 PM      Lab Results   Component Value Date/Time    SODIUM 133 (L) 04/25/2025 03:19 PM    POTASSIUM 3.9 04/25/2025 03:19 PM    CHLORIDE 97 04/25/2025 03:19 PM    CO2 24 04/25/2025 03:19 PM    GLUCOSE 95 04/25/2025 03:19 PM    BUN 10 04/25/2025 03:19 PM    CREATININE 0.79 04/25/2025 03:19 PM      Lab Results   Component Value Date/Time    ASTSGOT 23 04/25/2025 03:19 PM    ALTSGPT 19 04/25/2025 03:19 PM    ALBUMIN 3.7 04/25/2025 03:19 PM      Lab Results   Component Value Date/Time    CHOLSTRLTOT 188 11/06/2024 11:09 AM     (H) 11/06/2024 11:09 AM    HDL 49 11/06/2024 11:09 AM    TRIGLYCERIDE 95 11/06/2024 11:09 AM     No results for input(s): \"NTPROBNP\", \"TROPONINT\" in the last 72 hours.      Past " Medical History:   Diagnosis Date    Chickenpox     Essential hypertension, benign 6/5/2012    GERD (gastroesophageal reflux disease)     [place] measles     Mumps     Nasal drainage     Nonspecific abnormal electrocardiogram (ECG) (EKG) 6/5/2012    Other chest pain 6/5/2012     Past Surgical History:   Procedure Laterality Date    CHOLECYSTECTOMY      HYSTERECTOMY LAPAROSCOPY       Family History   Problem Relation Age of Onset    Lung Disease Mother     Lung Disease Father     Cancer Father      Social History     Socioeconomic History    Marital status:      Spouse name: Not on file    Number of children: Not on file    Years of education: Not on file    Highest education level: Not on file   Occupational History    Not on file   Tobacco Use    Smoking status: Never    Smokeless tobacco: Never   Vaping Use    Vaping status: Never Used   Substance and Sexual Activity    Alcohol use: Yes     Alcohol/week: 1.2 - 1.8 oz     Types: 2 Glasses of wine per week     Comment: occ    Drug use: No    Sexual activity: Not on file   Other Topics Concern    Not on file   Social History Narrative    Not on file     Social Drivers of Health     Financial Resource Strain: Not on file   Food Insecurity: Not on file   Transportation Needs: Not on file   Physical Activity: Not on file   Stress: Not on file   Social Connections: Not on file   Intimate Partner Violence: Not on file   Housing Stability: Not on file     Allergies   Allergen Reactions    Macrodantin [Nitrofurantoin Macrocrystal] Itching and Swelling    Amlodipine Swelling    Atorvastatin      Not appropriate for primary prevention at 84.    Augmentin Itching     Headaches    Cefdinir Hives, Itching and Rash    Ciprofloxacin      Tendinitis    Hydrochlorothiazide      Hyponatremia.     Lisinopril      Cough    Z-Pako [Azithromycin] Itching and Nausea       Current Outpatient Medications   Medication Sig Dispense Refill    methenamine hip (HIPPREX) 1 GM Tab Take 1  "tablet twice a day by oral route for 30 days.      estradiol (ESTRACE) 0.1 MG/GM vaginal cream Insert  into the vagina two times a week.      hydrALAZINE (APRESOLINE) 10 MG Tab Take 1 Tablet by mouth see administration instructions. 1 to 2 tablets every 8 hours as needed for SBP greater than 180 mmHg. 30 Tablet 0    spironolactone (ALDACTONE) 25 MG Tab Take 1 Tablet by mouth every day. 90 Tablet 3    lisinopril (PRINIVIL) 40 MG tablet Take 1 Tablet by mouth see administration instructions. Take lisinopril 40 mg in place of valsartan 320 mg for 1 week only 7 Tablet 0    carvedilol (COREG) 25 MG Tab TAKE 1 TABLET BY MOUTH TWICE DAILY WITH MEALS 180 Tablet 3    phenazopyridine (PYRIDIUM) 200 MG Tab Take 200 mg by mouth 3 times a day as needed.      estradiol (VIVELLE DOT) 0.1 MG/24HR PATCH BIWEEKLY APPLY 1 PATCH TOPICALLY TO THE SKIN 2 TIMES EVERY WEEK      vitamin D (CHOLECALCIFEROL) 1000 UNIT Tab Take 1,000 Units by mouth every day. 2000mg      Probiotic Product (PROBIOTIC DAILY PO) Take 1 Each by mouth every day.      Calcium Carbonate-Vitamin D (CALCIUM + D PO) Take 500 mg by mouth every day.      Multiple Vitamin (MULTI-VITAMIN DAILY PO) Take  by mouth every day.      omeprazole (PRILOSEC) 20 MG CPDR Take 20 mg by mouth every day.         No current facility-administered medications for this visit.       ROS  All others systems reviewed and negative.    BP (!) 140/80 (BP Location: Left arm, Patient Position: Sitting, BP Cuff Size: Adult)   Pulse 75   Ht 1.575 m (5' 2\")   Wt 74.4 kg (164 lb)   SpO2 97%  Body mass index is 30 kg/m².    General: No acute distress. Well nourished.  HEENT: EOM grossly intact, no scleral icterus, no pharyngeal erythema.   Neck:  No JVD at 90, no bruits, trachea midline  CVS: RRR. Normal S1, S2. No significant M/R/G. No Sig evident LE edema.  2+ radial pulses, 2+ PT pulses  Resp: CTAB. No wheezing or crackles/rhonchi. Normal respiratory effort.  Abdomen: Soft, NT, no nanda " hepatomegaly.  MSK/Ext: No clubbing or cyanosis.  Skin: Warm and dry, no rashes.  Neurological: CN III-XII grossly intact. No focal deficits.   Psych: A&O x 3, appropriate affect, good judgement    Physical Exam listed below was completed in entrety today and unchanged from 11-26-24 except where noted.  Some elements were copied from my note of that same day, which have been updated where appropriate and all reflect current meedical decision making from today.  I have confirmed and edited as necessary, the PFSH and ROS obtained by others.    Return in about 6 weeks (around 7/28/2025).    Written instructions given today:      - Please review this plan with your allergy doctor.    - Sometimes we have to have you take a medication holiday of each medication to make sure it is not causing the problem which in your case is the itching or possible allergic reaction.  You can run this by your allergy doctor but in general, typically if you stop something for 2 weeks, you should feel better.  If you are not certain, you start the medication back after 2 weeks and see if you feel worse.    STEP 1:  -Start spironolactone 25 mg once daily in the morning.  This is one of my favorite blood pressure medicines.  Stop taking potassium when you start taking spironolactone.  Spironolactone can help you hold onto potassium.  - Give yourself 2 weeks on the spironolactone to make sure you are doing fine with that before making the next medication change.  - Nonfasting blood test 2 to 4 weeks after starting the new medication to check on your sodium and potassium levels    IF YOU WANT TO KNOW IF THE MEDS ARE CAUSING THE ITCHING, then proceed with the following steps:    STEP 2: Stop your Xyzal for the experiment below.    STEP 3:  -Stop your omeprazole for 1 week, then started back, see if this impacts the itching.     STEP 4:  -Swab lisinopril 40 mg for valsartan 320 mg for 1 week only.  When you go back to the valsartan, any cough you  are getting should get better.  This way we can determine if the valsartan is causing the itching.  -Once we know the valsartan is not the problem, resume your valsartan 320 mg.    STEP 5:  -The for step will be swapping your carvedilol for something else.  But I do not want to do that yet.  I would rather see you back and see how we do with the first 3 steps.    AS NEEDED FOR HIGH BP:  - Hydralazine 10 mg tablets.  You can take any where from 1 to 2 tablets every 8 hours as needed for systolic blood pressure greater than 180 mmHg.      It is my pleasure to participate in the care of Ms. Rapp.  Please do not hesitate to contact me with questions or concerns.    Maite Ahuja MD, [facility]  Cardiologist, [facility]    Please note that this dictation was created using voice recognition software. I have made every reasonable attempt to correct obvious errors, but it is possible there are errors of grammar and possibly content that I did not discover before finalizing the note.        Jazmyne Maddox, P.A.-C.  [address]  [place] [address]  Via Fax: [phone]

## 2025-06-16 NOTE — PATIENT INSTRUCTIONS
- Please review this plan with your allergy doctor.    - Sometimes we have to have you take a medication holiday of each medication to make sure it is not causing the problem which in your case is the itching or possible allergic reaction.  You can run this by your allergy doctor but in general, typically if you stop something for 2 weeks, you should feel better.  If you are not certain, you start the medication back after 2 weeks and see if you feel worse.    STEP 1:  -Start spironolactone 25 mg once daily in the morning.  This is one of my favorite blood pressure medicines.  Stop taking potassium when you start taking spironolactone.  Spironolactone can help you hold onto potassium.  - Give yourself 2 weeks on the spironolactone to make sure you are doing fine with that before making the next medication change.  - Nonfasting blood test 2 to 4 weeks after starting the new medication to check on your sodium and potassium levels    IF YOU WANT TO KNOW IF THE MEDS ARE CAUSING THE ITCHING, then proceed with the following steps:    STEP 2: Stop your Xyzal for the experiment below.    STEP 3:  -Stop your omeprazole for 1 week, then started back, see if this impacts the itching.     STEP 4:  -Swab lisinopril 40 mg for valsartan 320 mg for 1 week only.  When you go back to the valsartan, any cough you are getting should get better.  This way we can determine if the valsartan is causing the itching.  -Once we know the valsartan is not the problem, resume your valsartan 320 mg.    STEP 5:  -The for step will be swapping your carvedilol for something else.  But I do not want to do that yet.  I would rather see you back and see how we do with the first 3 steps.    AS NEEDED FOR HIGH BP:  - Hydralazine 10 mg tablets.  You can take any where from 1 to 2 tablets every 8 hours as needed for systolic blood pressure greater than 180 mmHg.

## 2025-07-02 ENCOUNTER — TELEPHONE (OUTPATIENT)
Dept: CARDIOLOGY | Facility: MEDICAL CENTER | Age: 85
End: 2025-07-02
Payer: MEDICARE

## 2025-07-02 NOTE — LETTER
July 3, 2025        Danica Rapp    Dear Ophthalmology,    Dear Ophthalmology,    Thank you for your interest in coordinating the cardiovascular care of our mutual patient Danica Rapp 1940.  We have reviewed their [facility] records; and to the best of our understanding our patient has not had stenting, ablation, watchman, cardiothoracic surgery or hospitalization for cardiovascular reasons in the past 3 months and has not had a mitral clip, TAVR or valve replacement in the past 6 months.  Danica Rapp has seen us within the past 15 months and are having a low-risk procedure or surgery. They are considered to have nonmodifiable risk and may proceed with the proposed procedure or surgery.  The patient MAY NOT hold their antiplatelet or anticoagulation.      If you have any specific concerns for continuing Antiplatelets or Anticoagulation please reach out to us at [phone].     Thank you,  [facility]

## 2025-07-03 ENCOUNTER — HOSPITAL ENCOUNTER (OUTPATIENT)
Facility: MEDICAL CENTER | Age: 85
End: 2025-07-03
Payer: MEDICARE

## 2025-07-03 PROCEDURE — 87077 CULTURE AEROBIC IDENTIFY: CPT

## 2025-07-03 PROCEDURE — 87086 URINE CULTURE/COLONY COUNT: CPT

## 2025-07-03 PROCEDURE — 87186 SC STD MICRODIL/AGAR DIL: CPT

## 2025-07-03 NOTE — TELEPHONE ENCOUNTER
Last OV: 06/16/2025  Proposed Surgery: Cataract Surgery   Surgery Date: 07/16/2025 & 07/30/2025  Requesting Office Name: [facility]  Fax Number: [phone]  Preference of Location (default is surgery center unless specified by Cardiologist or [facility])  Prior Clearance Addressed: No    Is this a general clearance? YES   Anticoags/Antiplatelets: Other none   Anticoags/Antiplatelet managed by Cardiology? NA    Outstanding Cardiac Imaging : No  Ablation, Cardioversion, Stent, Cardiac Devices, Catheterization, Watchman: No  TAVR/Valve, Mitral Clip, Watchman (including open heart),: N/A   Recent Cardiac Hospitalization: No            When: N/A  History (cardiac history): Past Medical History[1]        Is this a dental clearance? NO  Ablation, Cardioversion, Watchman, Stents, Cath, Devices within the last 3 months? No   If yes- Send dental wait letter, do not forward to provider for review.     TAVR / Valve, Mitral clip within the last 6 months? No  If yes- Send dental wait letter, do not forward to provider for review.     If completing a general clearance, continue per protocol.           Surgical Clearance Letter Sent: YES   **Scan clearance request letter into [facility].**        [1]   Past Medical History:  Diagnosis Date    Chickenpox     Essential hypertension, benign 6/5/2012    GERD (gastroesophageal reflux disease)     [place] measles     Mumps     Nasal drainage     Nonspecific abnormal electrocardiogram (ECG) (EKG) 6/5/2012    Other chest pain 6/5/2012

## 2025-07-07 DIAGNOSIS — I10 ESSENTIAL HYPERTENSION, BENIGN: Primary | ICD-10-CM

## 2025-07-07 NOTE — TELEPHONE ENCOUNTER
FAYE        Caller: Danica Rapp      Topic/issue: Patient was asking for a call back regarding her medication being refilled and where that is in the process of getting filled. Please advise        Callback Number: [phone]      Thank you    -Jake STANFORD

## 2025-07-08 RX ORDER — VALSARTAN 320 MG/1
320 TABLET ORAL DAILY
Qty: 90 TABLET | Refills: 3 | Status: SHIPPED | OUTPATIENT
Start: 2025-07-08 | End: 2025-07-29

## 2025-07-09 ENCOUNTER — TELEPHONE (OUTPATIENT)
Dept: CARDIOLOGY | Facility: MEDICAL CENTER | Age: 85
End: 2025-07-09
Payer: MEDICARE

## 2025-07-09 NOTE — TELEPHONE ENCOUNTER
Noted:   Maite Ahuja M.D.  Martha, SHELBI Thomas, this patient is not actually taking lisinopril and valsartan.  She was instructed to stop valsartan for 1 week and take lisinopril in its place to see if valsartan was causing the itching.  Please follow-up with Danica, call her, find out if the temporary switch made any difference.  Assuming she is still on the valsartan, please discontinue the lisinopril from her list.  Thank you,      Phone Number Called: [phone]  Call outcome: Did not leave a detailed message. Requested patient to call back.  Message: Called to inform patient that we wanted to check in on her symptom of itching to see if there was any improvement when she switched temporarily to lisinopril. Requested call back to discuss and informed her that I will also send a [employer] message to her if she would prefer communicating through [employer].     [employer] message sent to pt with questions per LS.

## 2025-07-09 NOTE — TELEPHONE ENCOUNTER
"LS - Please review return phone call details below and advise. Would you still like me to discontinue her lisinopril for now until she has FV with AB on 7/29/25?    Pt called back to discuss medications.   Pt reports she has not yet tried switching to lisinopril yet.  Pt reports she got another UTI since last visit and urologist added another medication and she \"didn't want to take a chance\" or change multiple medications at the same time.   Pt reports that the only recommendation from LS she took action on was that she dropped the potassium and started the spironolactone.   Pt reports that she has one more appt with allergist on 8/13/25 and wants to wait until this appointment and until UTI resolves to make further medication adjustments if LS agreeable. Pt reports that she had told her other providers that the itching started right after an antibiotic previously.   Pt reports itching has improved but has not gone away. Per pt she is able to go 2-3 days on allergy pill before itching resumes.  Pt reports she is monitoring her blood pressure. Reports average around 140s/70 with her machine but notes she was told by a nurse at our office in the past that her machine is \"~16 points higher\" than our manual cuffs for the systolic BP.       "

## 2025-07-09 NOTE — TELEPHONE ENCOUNTER
That sounds good.  She does not need to make the medication change if she is not suspicious about the valsartan.  I will discontinue the lisinopril from her list.  No further action required unless patient is waiting to hear back 1 last time to clarify instructions.    Continue to monitor blood pressure, no medication changes.

## 2025-07-09 NOTE — TELEPHONE ENCOUNTER
Phone Number Called: [phone]  Call outcome: Spoke to patient regarding message below.  Message: Called to inform patient that LS advises she does not need to make med change if she is not suspicious of valsartan. Advised pt there are no med changes recommended at this time and to continue to monitor blood pressure per LS and follow-up with AB on 7/29/25. Pt verbalizes understanding.

## 2025-07-17 ENCOUNTER — TELEPHONE (OUTPATIENT)
Dept: CARDIOLOGY | Facility: MEDICAL CENTER | Age: 85
End: 2025-07-17
Payer: MEDICARE

## 2025-07-17 NOTE — TELEPHONE ENCOUNTER
FAYE  Caller: Danica Rapp    Topic/issue: Patient has been taking three medications in the morning:   valsartan (DIOVAN) 320 MG tablet , carvedilol (COREG) 25 MG Tab , spironolactone (ALDACTONE) 25 MG Tab     Patient reports that when she takes these, it makes her BP dip pretty low, causing some dizziness, blurry vision, and some fatigue. Patient wanted to know if there is a way to adjust these medications or something that may help counteract these feelings.    BP Readings:  BP before medication: 124/60 with HR 73  BP after medication: 106/50 or 106/52 with HR 71 and 76    Please call patient back to discuss. Patient has not yet taken her medications today.     Callback Number: [phone]    Thank you,  Sharon VALE

## 2025-07-17 NOTE — TELEPHONE ENCOUNTER
LS - Please review and advise. Pt reporting some lower BPs recently (systolics around 100-110) with symptoms of dizziness, blurry vision, and some fatigue. Pt has not taken her BP meds today and would like to know if you would like to make any adjustments to her meds before she takes any of her BP meds. Please let me know if you would like to make any adjustments to her medications. RAKESHLINDA pt has FV with AB on 7/29/25. Thank you!    Phone Number Called: [phone]  Call outcome: Spoke to patient regarding message below.  Message: Called to ask further questions regarding pt concerns with BP and symptoms.  Pt reports that symptoms would typically go away after 1-2 hours with some rest/laying down.  Pt reports that she was told her BP was about 16 points higher on her bp machine compared to manual cuff in office but she is unsure how accurate this is.   7/11: 116/64  7/15: 103/54, 116/58 dizziness  7/16: 106/50 worst dizziness and blurred vision.   7/17 no meds 124/60 around noon no dizziness, fatigue, or blurred vision today. Pt reports that she is well-hydrated.  Pt reports her diastolic bp has been dipping low into 50s as well when they were previously in the 60s-70s.   Pt wondering if Dr. Ahuja would like to make any adjustments to her 3 BP meds and if she should take her BP meds today. Pt states she likes taking her spironolactone and wondering if changes could possibly made to either of the other two BP meds: valsartan or carvedilol.   Informed pt that we will reach out to LS for any recs and reach back out to her, pt verbalizes understanding.

## 2025-07-17 NOTE — TELEPHONE ENCOUNTER
Phone Number Called: [phone]  Call outcome: Spoke to patient regarding message below.  Message: Called to inform patient of LS recs in previous note. Pt verbalizes understanding. Pt confirms she does have a pill cutter to help her cut her medications in half. Advised pt to continue monitoring her blood pressures and reach back out to us if she is still experiencing somewhat lower BPs with symptoms or if she has consistently higher readings of 130/80 or greater, pt verbalizes understanding. Also advised pt to bring her BP machine in to her appt with AB on 7/29/25 so we can compare her machine's readings to a manual cuff reading in-office. Pt verbalizes understanding.

## 2025-07-17 NOTE — TELEPHONE ENCOUNTER
Martha, please give her reassurance that although she does not feel well, the blood pressures are in the normal range and not dangerously low.  I do not think she should skip her medications altogether but if she wants, this morning, she can cut all of her medications in half and see how it goes.    She can cut every blood pressure medication in half for a.m. and p.m. and continue to monitor her pressures.  This would include her carvedilol, spironolactone, valsartan.  Thank you.

## 2025-07-21 ENCOUNTER — HOSPITAL ENCOUNTER (OUTPATIENT)
Dept: LAB | Facility: MEDICAL CENTER | Age: 85
End: 2025-07-21
Attending: INTERNAL MEDICINE
Payer: MEDICARE

## 2025-07-21 DIAGNOSIS — I10 ESSENTIAL HYPERTENSION, BENIGN: ICD-10-CM

## 2025-07-21 LAB
ANION GAP SERPL CALC-SCNC: 11 MMOL/L (ref 7–16)
BUN SERPL-MCNC: 18 MG/DL (ref 8–22)
CALCIUM SERPL-MCNC: 9.3 MG/DL (ref 8.5–10.5)
CHLORIDE SERPL-SCNC: 99 MMOL/L (ref 96–112)
CO2 SERPL-SCNC: 24 MMOL/L (ref 20–33)
CREAT SERPL-MCNC: 0.83 MG/DL (ref 0.5–1.4)
GFR SERPLBLD CREATININE-BSD FMLA CKD-EPI: 69 ML/MIN/1.73 M 2
GLUCOSE SERPL-MCNC: 87 MG/DL (ref 65–99)
POTASSIUM SERPL-SCNC: 4.1 MMOL/L (ref 3.6–5.5)
SODIUM SERPL-SCNC: 134 MMOL/L (ref 135–145)

## 2025-07-21 PROCEDURE — 80048 BASIC METABOLIC PNL TOTAL CA: CPT

## 2025-07-21 PROCEDURE — 36415 COLL VENOUS BLD VENIPUNCTURE: CPT

## 2025-07-22 ENCOUNTER — RESULTS FOLLOW-UP (OUTPATIENT)
Facility: MEDICAL CENTER | Age: 85
End: 2025-07-22
Payer: MEDICARE

## 2025-07-29 ENCOUNTER — OFFICE VISIT (OUTPATIENT)
Facility: MEDICAL CENTER | Age: 85
End: 2025-07-29
Attending: NURSE PRACTITIONER
Payer: MEDICARE

## 2025-07-29 VITALS
DIASTOLIC BLOOD PRESSURE: 70 MMHG | OXYGEN SATURATION: 98 % | BODY MASS INDEX: 29.63 KG/M2 | WEIGHT: 161 LBS | HEART RATE: 79 BPM | SYSTOLIC BLOOD PRESSURE: 130 MMHG | RESPIRATION RATE: 16 BRPM | HEIGHT: 62 IN

## 2025-07-29 DIAGNOSIS — R42 DIZZINESS: ICD-10-CM

## 2025-07-29 DIAGNOSIS — I10 ESSENTIAL HYPERTENSION: ICD-10-CM

## 2025-07-29 DIAGNOSIS — G47.33 OBSTRUCTIVE SLEEP APNEA-HYPOPNEA SYNDROME: ICD-10-CM

## 2025-07-29 DIAGNOSIS — I10 ESSENTIAL HYPERTENSION, BENIGN: Primary | ICD-10-CM

## 2025-07-29 DIAGNOSIS — G47.34 NOCTURNAL HYPOXEMIA: ICD-10-CM

## 2025-07-29 PROBLEM — R06.09 DOE (DYSPNEA ON EXERTION): Status: RESOLVED | Noted: 2019-05-02 | Resolved: 2025-07-29

## 2025-07-29 PROCEDURE — 3075F SYST BP GE 130 - 139MM HG: CPT | Performed by: NURSE PRACTITIONER

## 2025-07-29 PROCEDURE — 99214 OFFICE O/P EST MOD 30 MIN: CPT | Performed by: NURSE PRACTITIONER

## 2025-07-29 PROCEDURE — 99212 OFFICE O/P EST SF 10 MIN: CPT | Performed by: NURSE PRACTITIONER

## 2025-07-29 PROCEDURE — 3078F DIAST BP <80 MM HG: CPT | Performed by: NURSE PRACTITIONER

## 2025-07-29 RX ORDER — VALSARTAN 320 MG/1
160 TABLET ORAL DAILY
Qty: 50 TABLET | Refills: 3
Start: 2025-07-29

## 2025-07-29 RX ORDER — CARVEDILOL 25 MG/1
12.5 TABLET ORAL 2 TIMES DAILY WITH MEALS
Qty: 100 TABLET | Refills: 3
Start: 2025-07-29

## 2025-07-29 RX ORDER — SPIRONOLACTONE 25 MG/1
12.5 TABLET ORAL DAILY
Qty: 50 TABLET | Refills: 3
Start: 2025-07-29

## 2025-07-29 ASSESSMENT — ENCOUNTER SYMPTOMS
NAUSEA: 0
SHORTNESS OF BREATH: 0
LOSS OF CONSCIOUSNESS: 0
INSOMNIA: 0
BRUISES/BLEEDS EASILY: 0
HEADACHES: 0
MYALGIAS: 0
ORTHOPNEA: 0
COUGH: 0
PND: 0
FEVER: 0
CHILLS: 0
ABDOMINAL PAIN: 0
PALPITATIONS: 0
DIZZINESS: 1

## 2025-07-29 ASSESSMENT — FIBROSIS 4 INDEX: FIB4 SCORE: 1.22

## 2025-07-29 NOTE — PROGRESS NOTES
Chief Complaint   Patient presents with    Follow-Up    HTN (Controlled)    Evaluation Of Medication Change       Subjective     Danica Rapp is a 84 y.o. female who presents today for follow-up of HTN and medication change evaluation.    Danica is an 84 year old female with history of hypertension, hyperlipidemia, and mildly elevated CAC score (<100 in 2023).    At last follow-up in June 2025, she was still dealing with allergic reaction/rash, and several BP medication changes were discussed.    Since then, patient's allergist feels her rash is a belated reaction to antibiotic (cephalosporin), and the allergic reaction is better on an antihistamine, which she takes a couple of times a week. Danica also decided not to change any of her BP medications, but about two weeks, BP was running low, so all (Coreg, Valsartan and Aldactone) were cut in half. Her BP at home is running 110-130 over 50-80. She notes she feels better when BP is 120-130 over 70-80.    She is here today for six week follow-up to discuss. She feels good today: no chest pain, pressure, tightness or discomfort; no palpitations; breathing is stable with no orthopnea or PND. Her dizziness seems to be a little improved; no falls or syncope; her LE edema is better on Aldactone too.    She hasn't been as active, due to a tendon injury in her right foot (due to Cipro), but she is trying to walk a little more gradually.    Past Medical History[1]  Past Surgical History[2]  Family History   Problem Relation Age of Onset    Lung Disease Mother     Lung Disease Father     Cancer Father      Social History     Socioeconomic History    Marital status:      Spouse name: Not on file    Number of children: Not on file    Years of education: Not on file    Highest education level: Not on file   Occupational History    Not on file   Tobacco Use    Smoking status: Never    Smokeless tobacco: Never   Vaping Use    Vaping status: Never Used   Substance and  "Sexual Activity    Alcohol use: Yes     Alcohol/week: 1.2 - 1.8 oz     Types: 2 Glasses of wine per week     Comment: occ    Drug use: No    Sexual activity: Not on file   Other Topics Concern    Not on file   Social History Narrative    Not on file     Social Drivers of Health     Financial Resource Strain: Not on file   Food Insecurity: Not on file   Transportation Needs: Not on file   Physical Activity: Not on file   Stress: Not on file   Social Connections: Not on file   Intimate Partner Violence: Not on file   Housing Stability: Not on file     Allergies[3]  Encounter Medications[4]  Review of Systems   Constitutional:  Negative for chills and fever.   HENT:  Negative for congestion.    Respiratory:  Negative for cough and shortness of breath.    Cardiovascular:  Negative for chest pain, palpitations, orthopnea, leg swelling and PND.   Gastrointestinal:  Negative for abdominal pain and nausea.   Musculoskeletal:  Negative for myalgias.   Skin:  Negative for rash.   Neurological:  Positive for dizziness. Negative for loss of consciousness and headaches.        Seems better on current meds.   Endo/Heme/Allergies:  Does not bruise/bleed easily.   Psychiatric/Behavioral:  The patient does not have insomnia.               Objective     /70 (BP Location: Left arm, Patient Position: Sitting, BP Cuff Size: Adult)   Pulse 79   Resp 16   Ht 1.575 m (5' 2\")   Wt 73 kg (161 lb)   SpO2 98%   BMI 29.45 kg/m²     Physical Exam  Constitutional:       Appearance: She is well-developed.   HENT:      Head: Normocephalic.   Neck:      Vascular: No JVD.   Cardiovascular:      Rate and Rhythm: Normal rate and regular rhythm.      Heart sounds: Normal heart sounds.   Pulmonary:      Effort: Pulmonary effort is normal. No respiratory distress.      Breath sounds: Normal breath sounds. No wheezing or rales.   Abdominal:      General: Bowel sounds are normal. There is no distension.      Palpations: Abdomen is soft.      " Tenderness: There is no abdominal tenderness.   Musculoskeletal:         General: Normal range of motion.      Cervical back: Normal range of motion and neck supple.   Skin:     General: Skin is warm and dry.      Findings: No rash.   Neurological:      Mental Status: She is alert and oriented to person, place, and time.   Psychiatric:         Mood and Affect: Mood normal.         Behavior: Behavior normal.       CT SCAN(S):    FINDINGS OF CTCS OF 8/23/2023:  Coronary calcification:  LMA - 0.0  LCX - 0.0  LAD - 93.2  RCA - 0.0  PDA - 0.0  Total Calcium Score: 93.2    ECHOCARDIOGRAPHY:    CONCLUSIONS OF TTE OF 5/26/2022:  Compared to the prior echo on 9/12/19, there has been no significant   changes.   Normal left ventricular size, wall thickness and systolic function.  No regional wall motion abnormality.  No hemodynamically significant valvular heart disease noted.     CONCLUSIONS OF TTE OF 9/12/2019:  No prior study is available for comparison.   Mild asymmetric septalhypertrophy.  Mild asymmetric septal hypertrophy.  Normal left ventricular systolic function.  Left ventricular ejection fraction is visually estimated to be 70%.  Normal right ventricular size and systolic function.  Normal left atrial size.  Mild MAC. No mitral stenosis.  Trace mitral regurgitation.  Structurally normal aortic valve without significant stenosis or   regurgitation.  Normal pericardium without effusion.  Normal aortic root for body surface area.    LABS:    Lab Results   Component Value Date/Time    CHOLSTRLTOT 188 11/06/2024 11:09 AM     (H) 11/06/2024 11:09 AM    HDL 49 11/06/2024 11:09 AM    TRIGLYCERIDE 95 11/06/2024 11:09 AM        Lab Results   Component Value Date/Time    ASTSGOT 23 04/25/2025 03:19 PM    ALTSGPT 19 04/25/2025 03:19 PM       Lab Results   Component Value Date/Time    SODIUM 134 (L) 07/21/2025 02:19 PM    POTASSIUM 4.1 07/21/2025 02:19 PM    CHLORIDE 99 07/21/2025 02:19 PM    CO2 24 07/21/2025 02:19 PM     GLUCOSE 87 07/21/2025 02:19 PM    BUN 18 07/21/2025 02:19 PM    CREATININE 0.83 07/21/2025 02:19 PM    BUNCREATRAT 23 07/27/2015 11:37 AM        Assessment & Plan     1. Essential hypertension, benign  valsartan (DIOVAN) 320 MG tablet    spironolactone (ALDACTONE) 25 MG Tab      2. Essential hypertension  carvedilol (COREG) 25 MG Tab      3. Dizziness        4. Obstructive sleep apnea-hypopnea syndrome        5. Nocturnal hypoxemia            Medical Decision Making: Today's Assessment/Status/Plan:        Hypertension, treated with Valsartan 160mg, Coreg 12.5mg twice daily and Aldactone 12.5mg once daily. BP is fairly good today; she feels better with -130 systolic, and this might be a better range for her.  She is to keep track of BP at home for now on same meds; if she gets <110 or >145 systolic consistently, to let us know.  Dizziness, improved with above medication changes (lower doses).  KAMRYN, treated/stable.  Hyperlipidemia, with mildly elevated CAC score (2023). She really doesn't want to take statins, and given her age, this is appropriate. No changes at this time.  Allergic reaction, followed by allergist, improved with PRN antihistamine use.    For now, same medications and doses.  Keep track of BP at home, 2-3 times per week.  I sent her a [employer] message to respond to, if she gets consistent readings outside BP range for her.    Keep October 2025 follow-up with Dr. Ahuja; follow-up sooner if clinical condition changes.                       [1]   Past Medical History:  Diagnosis Date    Chickenpox     Essential hypertension, benign 6/5/2012    GERD (gastroesophageal reflux disease)     [place] measles     Mumps     Nasal drainage     Nonspecific abnormal electrocardiogram (ECG) (EKG) 6/5/2012    Other chest pain 6/5/2012   [2]   Past Surgical History:  Procedure Laterality Date    CHOLECYSTECTOMY      HYSTERECTOMY LAPAROSCOPY     [3]   Allergies  Allergen Reactions    Macrodantin  [Nitrofurantoin Macrocrystal] Itching and Swelling    Amlodipine Swelling    Atorvastatin      Not appropriate for primary prevention at 84.    Augmentin Itching     Headaches    Cefdinir Hives, Itching and Rash    Ciprofloxacin      Tendinitis    Hydrochlorothiazide      Hyponatremia.     Lisinopril      Cough    Z-Pako [Azithromycin] Itching and Nausea   [4]   Outpatient Encounter Medications as of 7/29/2025   Medication Sig Dispense Refill    valsartan (DIOVAN) 320 MG tablet Take 0.5 Tablets by mouth every day. 50 Tablet 3    spironolactone (ALDACTONE) 25 MG Tab Take 0.5 Tablets by mouth every day. 50 Tablet 3    carvedilol (COREG) 25 MG Tab Take 0.5 Tablets by mouth 2 times a day with meals. 100 Tablet 3    methenamine hip (HIPPREX) 1 GM Tab Take 1 tablet twice a day by oral route for 30 days.      estradiol (ESTRACE) 0.1 MG/GM vaginal cream Insert  into the vagina two times a week.      hydrALAZINE (APRESOLINE) 10 MG Tab Take 1 Tablet by mouth see administration instructions. 1 to 2 tablets every 8 hours as needed for SBP greater than 180 mmHg. 30 Tablet 0    phenazopyridine (PYRIDIUM) 200 MG Tab Take 200 mg by mouth 3 times a day as needed.      estradiol (VIVELLE DOT) 0.1 MG/24HR PATCH BIWEEKLY APPLY 1 PATCH TOPICALLY TO THE SKIN 2 TIMES EVERY WEEK      vitamin D (CHOLECALCIFEROL) 1000 UNIT Tab Take 1,000 Units by mouth every day. 2000mg      Probiotic Product (PROBIOTIC DAILY PO) Take 1 Each by mouth every day.      Multiple Vitamin (MULTI-VITAMIN DAILY PO) Take  by mouth every day.      omeprazole (PRILOSEC) 20 MG CPDR Take 20 mg by mouth every day.        [DISCONTINUED] valsartan (DIOVAN) 320 MG tablet TAKE 1 TABLET BY MOUTH DAILY (Patient taking differently: Take 320 mg by mouth every day. Taking half a tablet in the morning) 90 Tablet 3    [DISCONTINUED] spironolactone (ALDACTONE) 25 MG Tab Take 1 Tablet by mouth every day. (Patient taking differently: Take 25 mg by mouth every day. Taking half a tablet  daily) 90 Tablet 3    [DISCONTINUED] carvedilol (COREG) 25 MG Tab TAKE 1 TABLET BY MOUTH TWICE DAILY WITH MEALS (Patient taking differently: Take 25 mg by mouth 2 times a day with meals. Taking 0.5 of a tablet in the morning and at night) 180 Tablet 3    Calcium Carbonate-Vitamin D (CALCIUM + D PO) Take 500 mg by mouth every day. (Patient not taking: Reported on 7/29/2025)       No facility-administered encounter medications on file as of 7/29/2025.